# Patient Record
Sex: MALE | Race: OTHER | Employment: FULL TIME | URBAN - METROPOLITAN AREA
[De-identification: names, ages, dates, MRNs, and addresses within clinical notes are randomized per-mention and may not be internally consistent; named-entity substitution may affect disease eponyms.]

---

## 2021-09-20 ENCOUNTER — APPOINTMENT (OUTPATIENT)
Dept: GENERAL RADIOLOGY | Age: 41
DRG: 871 | End: 2021-09-20
Attending: EMERGENCY MEDICINE
Payer: COMMERCIAL

## 2021-09-20 ENCOUNTER — HOSPITAL ENCOUNTER (INPATIENT)
Age: 41
LOS: 14 days | Discharge: HOME HEALTH | DRG: 871 | End: 2021-10-04
Attending: EMERGENCY MEDICINE | Admitting: INTERNAL MEDICINE
Payer: COMMERCIAL

## 2021-09-20 DIAGNOSIS — R06.02 SHORTNESS OF BREATH: ICD-10-CM

## 2021-09-20 DIAGNOSIS — D72.819 LEUKOPENIA, UNSPECIFIED TYPE: ICD-10-CM

## 2021-09-20 DIAGNOSIS — J96.01 ACUTE HYPOXEMIC RESPIRATORY FAILURE DUE TO COVID-19 (HCC): ICD-10-CM

## 2021-09-20 DIAGNOSIS — U07.1 ACUTE HYPOXEMIC RESPIRATORY FAILURE DUE TO COVID-19 (HCC): ICD-10-CM

## 2021-09-20 DIAGNOSIS — J12.82 PNEUMONIA DUE TO COVID-19 VIRUS: Primary | ICD-10-CM

## 2021-09-20 DIAGNOSIS — J96.01 ACUTE RESPIRATORY FAILURE WITH HYPOXIA (HCC): ICD-10-CM

## 2021-09-20 DIAGNOSIS — U07.1 COVID-19: ICD-10-CM

## 2021-09-20 DIAGNOSIS — U07.1 PNEUMONIA DUE TO COVID-19 VIRUS: Primary | ICD-10-CM

## 2021-09-20 LAB
ALBUMIN SERPL-MCNC: 2.5 G/DL (ref 3.5–5)
ALBUMIN/GLOB SERPL: 0.5 {RATIO} (ref 1.2–3.5)
ALP SERPL-CCNC: 95 U/L (ref 50–136)
ALT SERPL-CCNC: 124 U/L (ref 12–65)
ANION GAP SERPL CALC-SCNC: 11 MMOL/L (ref 7–16)
ARTERIAL PATENCY WRIST A: POSITIVE
AST SERPL-CCNC: 46 U/L (ref 15–37)
ATRIAL RATE: 288 BPM
BASE EXCESS BLD CALC-SCNC: 0.5 MMOL/L
BASOPHILS # BLD: 0 K/UL (ref 0–0.2)
BASOPHILS NFR BLD: 0 % (ref 0–2)
BDY SITE: ABNORMAL
BILIRUB SERPL-MCNC: 0.5 MG/DL (ref 0.2–1.1)
BUN SERPL-MCNC: 20 MG/DL (ref 6–23)
CALCIUM SERPL-MCNC: 9.1 MG/DL (ref 8.3–10.4)
CALCULATED R AXIS, ECG10: 39 DEGREES
CALCULATED T AXIS, ECG11: 31 DEGREES
CHLORIDE SERPL-SCNC: 108 MMOL/L (ref 98–107)
CO2 BLD-SCNC: 25 MMOL/L (ref 13–23)
CO2 SERPL-SCNC: 23 MMOL/L (ref 21–32)
CREAT SERPL-MCNC: 0.93 MG/DL (ref 0.8–1.5)
CRP SERPL-MCNC: 9.1 MG/DL (ref 0–0.9)
D DIMER PPP FEU-MCNC: 1.96 UG/ML(FEU)
DIAGNOSIS, 93000: NORMAL
DIFFERENTIAL METHOD BLD: ABNORMAL
EOSINOPHIL # BLD: 0 K/UL (ref 0–0.8)
EOSINOPHIL NFR BLD: 0 % (ref 0.5–7.8)
ERYTHROCYTE [DISTWIDTH] IN BLOOD BY AUTOMATED COUNT: 13.2 % (ref 11.9–14.6)
GAS FLOW.O2 O2 DELIVERY SYS: ABNORMAL L/MIN
GLOBULIN SER CALC-MCNC: 5 G/DL (ref 2.3–3.5)
GLUCOSE BLD STRIP.AUTO-MCNC: 156 MG/DL (ref 65–100)
GLUCOSE SERPL-MCNC: 140 MG/DL (ref 65–100)
HCO3 BLD-SCNC: 24.6 MMOL/L (ref 22–26)
HCT VFR BLD AUTO: 41.7 % (ref 41.1–50.3)
HGB BLD-MCNC: 13.5 G/DL (ref 13.6–17.2)
IMM GRANULOCYTES # BLD AUTO: 0.1 K/UL (ref 0–0.5)
IMM GRANULOCYTES NFR BLD AUTO: 4 % (ref 0–5)
INR PPP: 1
LACTATE SERPL-SCNC: 3 MMOL/L (ref 0.4–2)
LYMPHOCYTES # BLD: 0.4 K/UL (ref 0.5–4.6)
LYMPHOCYTES NFR BLD: 18 % (ref 13–44)
MAGNESIUM SERPL-MCNC: 2.3 MG/DL (ref 1.8–2.4)
MCH RBC QN AUTO: 30.2 PG (ref 26.1–32.9)
MCHC RBC AUTO-ENTMCNC: 32.4 G/DL (ref 31.4–35)
MCV RBC AUTO: 93.3 FL (ref 79.6–97.8)
MONOCYTES # BLD: 0.3 K/UL (ref 0.1–1.3)
MONOCYTES NFR BLD: 12 % (ref 4–12)
NEUTS SEG # BLD: 1.5 K/UL (ref 1.7–8.2)
NEUTS SEG NFR BLD: 67 % (ref 43–78)
NRBC # BLD: 0 K/UL (ref 0–0.2)
O2/TOTAL GAS SETTING VFR VENT: 100 %
PCO2 BLD: 37.1 MMHG (ref 35–45)
PEEP RESPIRATORY: 12 CMH2O
PH BLD: 7.43 [PH] (ref 7.35–7.45)
PLATELET # BLD AUTO: 336 K/UL (ref 150–450)
PMV BLD AUTO: 9.5 FL (ref 9.4–12.3)
PO2 BLD: 87 MMHG (ref 75–100)
POTASSIUM SERPL-SCNC: 3.4 MMOL/L (ref 3.5–5.1)
PRESSURE SUPPORT SETTING VENT: 4 CMH2O
PROCALCITONIN SERPL-MCNC: 0.23 NG/ML
PROT SERPL-MCNC: 7.5 G/DL (ref 6.3–8.2)
PROTHROMBIN TIME: 13.3 SEC (ref 12.6–14.5)
Q-T INTERVAL, ECG07: 302 MS
QRS DURATION, ECG06: 92 MS
QTC CALCULATION (BEZET), ECG08: 408 MS
RBC # BLD AUTO: 4.47 M/UL (ref 4.23–5.6)
SAO2 % BLD: 96.9 % (ref 95–98)
SERVICE CMNT-IMP: ABNORMAL
SERVICE CMNT-IMP: ABNORMAL
SODIUM SERPL-SCNC: 142 MMOL/L (ref 136–145)
SPECIMEN TYPE: ABNORMAL
T4 FREE SERPL-MCNC: 1.7 NG/DL (ref 0.78–1.46)
TSH SERPL DL<=0.005 MIU/L-ACNC: 0.11 UIU/ML (ref 0.36–3.74)
VENTRICULAR RATE, ECG03: 110 BPM
WBC # BLD AUTO: 2.3 K/UL (ref 4.3–11.1)

## 2021-09-20 PROCEDURE — 84439 ASSAY OF FREE THYROXINE: CPT

## 2021-09-20 PROCEDURE — XW033H5 INTRODUCTION OF TOCILIZUMAB INTO PERIPHERAL VEIN, PERCUTANEOUS APPROACH, NEW TECHNOLOGY GROUP 5: ICD-10-PCS | Performed by: INTERNAL MEDICINE

## 2021-09-20 PROCEDURE — 80053 COMPREHEN METABOLIC PANEL: CPT

## 2021-09-20 PROCEDURE — 36600 WITHDRAWAL OF ARTERIAL BLOOD: CPT

## 2021-09-20 PROCEDURE — 74011636637 HC RX REV CODE- 636/637: Performed by: INTERNAL MEDICINE

## 2021-09-20 PROCEDURE — 83735 ASSAY OF MAGNESIUM: CPT

## 2021-09-20 PROCEDURE — 74011250636 HC RX REV CODE- 250/636: Performed by: INTERNAL MEDICINE

## 2021-09-20 PROCEDURE — 5A09457 ASSISTANCE WITH RESPIRATORY VENTILATION, 24-96 CONSECUTIVE HOURS, CONTINUOUS POSITIVE AIRWAY PRESSURE: ICD-10-PCS | Performed by: INTERNAL MEDICINE

## 2021-09-20 PROCEDURE — 99223 1ST HOSP IP/OBS HIGH 75: CPT | Performed by: INTERNAL MEDICINE

## 2021-09-20 PROCEDURE — 85025 COMPLETE CBC W/AUTO DIFF WBC: CPT

## 2021-09-20 PROCEDURE — 86140 C-REACTIVE PROTEIN: CPT

## 2021-09-20 PROCEDURE — 93005 ELECTROCARDIOGRAM TRACING: CPT | Performed by: EMERGENCY MEDICINE

## 2021-09-20 PROCEDURE — 94660 CPAP INITIATION&MGMT: CPT

## 2021-09-20 PROCEDURE — 96375 TX/PRO/DX INJ NEW DRUG ADDON: CPT

## 2021-09-20 PROCEDURE — 71045 X-RAY EXAM CHEST 1 VIEW: CPT

## 2021-09-20 PROCEDURE — 74011000250 HC RX REV CODE- 250: Performed by: INTERNAL MEDICINE

## 2021-09-20 PROCEDURE — 82803 BLOOD GASES ANY COMBINATION: CPT

## 2021-09-20 PROCEDURE — 82962 GLUCOSE BLOOD TEST: CPT

## 2021-09-20 PROCEDURE — 99285 EMERGENCY DEPT VISIT HI MDM: CPT

## 2021-09-20 PROCEDURE — 85379 FIBRIN DEGRADATION QUANT: CPT

## 2021-09-20 PROCEDURE — 96374 THER/PROPH/DIAG INJ IV PUSH: CPT

## 2021-09-20 PROCEDURE — 84443 ASSAY THYROID STIM HORMONE: CPT

## 2021-09-20 PROCEDURE — 2709999900 HC NON-CHARGEABLE SUPPLY

## 2021-09-20 PROCEDURE — 65620000000 HC RM CCU GENERAL

## 2021-09-20 PROCEDURE — 84145 PROCALCITONIN (PCT): CPT

## 2021-09-20 PROCEDURE — 74011000258 HC RX REV CODE- 258: Performed by: INTERNAL MEDICINE

## 2021-09-20 PROCEDURE — 74011250636 HC RX REV CODE- 250/636: Performed by: EMERGENCY MEDICINE

## 2021-09-20 PROCEDURE — 85610 PROTHROMBIN TIME: CPT

## 2021-09-20 PROCEDURE — 94761 N-INVAS EAR/PLS OXIMETRY MLT: CPT

## 2021-09-20 PROCEDURE — 83605 ASSAY OF LACTIC ACID: CPT

## 2021-09-20 RX ORDER — SODIUM CHLORIDE 0.9 % (FLUSH) 0.9 %
5-40 SYRINGE (ML) INJECTION EVERY 8 HOURS
Status: DISCONTINUED | OUTPATIENT
Start: 2021-09-20 | End: 2021-10-04 | Stop reason: HOSPADM

## 2021-09-20 RX ORDER — OXYCODONE HYDROCHLORIDE 5 MG/1
5 TABLET ORAL
Status: DISCONTINUED | OUTPATIENT
Start: 2021-09-20 | End: 2021-09-20

## 2021-09-20 RX ORDER — ONDANSETRON 2 MG/ML
4 INJECTION INTRAMUSCULAR; INTRAVENOUS
Status: COMPLETED | OUTPATIENT
Start: 2021-09-20 | End: 2021-09-20

## 2021-09-20 RX ORDER — FACIAL-BODY WIPES
10 EACH TOPICAL DAILY PRN
Status: DISCONTINUED | OUTPATIENT
Start: 2021-09-20 | End: 2021-10-04 | Stop reason: HOSPADM

## 2021-09-20 RX ORDER — MORPHINE SULFATE 2 MG/ML
2 INJECTION, SOLUTION INTRAMUSCULAR; INTRAVENOUS
Status: DISCONTINUED | OUTPATIENT
Start: 2021-09-20 | End: 2021-10-04 | Stop reason: HOSPADM

## 2021-09-20 RX ORDER — SODIUM CHLORIDE 9 MG/ML
50 INJECTION, SOLUTION INTRAVENOUS CONTINUOUS
Status: DISPENSED | OUTPATIENT
Start: 2021-09-20 | End: 2021-09-24

## 2021-09-20 RX ORDER — DEXAMETHASONE SODIUM PHOSPHATE 100 MG/10ML
20 INJECTION INTRAMUSCULAR; INTRAVENOUS
Status: COMPLETED | OUTPATIENT
Start: 2021-09-20 | End: 2021-09-20

## 2021-09-20 RX ORDER — ENOXAPARIN SODIUM 100 MG/ML
40 INJECTION SUBCUTANEOUS EVERY 12 HOURS
Status: DISCONTINUED | OUTPATIENT
Start: 2021-09-20 | End: 2021-10-04 | Stop reason: HOSPADM

## 2021-09-20 RX ORDER — ACETAMINOPHEN 325 MG/1
650 TABLET ORAL
Status: DISCONTINUED | OUTPATIENT
Start: 2021-09-20 | End: 2021-10-04 | Stop reason: HOSPADM

## 2021-09-20 RX ORDER — SODIUM CHLORIDE 0.9 % (FLUSH) 0.9 %
5-40 SYRINGE (ML) INJECTION AS NEEDED
Status: DISCONTINUED | OUTPATIENT
Start: 2021-09-20 | End: 2021-10-04 | Stop reason: HOSPADM

## 2021-09-20 RX ORDER — SODIUM CHLORIDE 0.9 % (FLUSH) 0.9 %
5-40 SYRINGE (ML) INJECTION AS NEEDED
Status: DISCONTINUED | OUTPATIENT
Start: 2021-09-20 | End: 2021-09-20

## 2021-09-20 RX ADMIN — INSULIN HUMAN 3 UNITS: 100 INJECTION, SOLUTION PARENTERAL at 21:00

## 2021-09-20 RX ADMIN — Medication 10 ML: at 14:00

## 2021-09-20 RX ADMIN — Medication 10 ML: at 21:00

## 2021-09-20 RX ADMIN — DEXAMETHASONE SODIUM PHOSPHATE 20 MG: 10 INJECTION INTRAMUSCULAR; INTRAVENOUS at 14:55

## 2021-09-20 RX ADMIN — Medication 10 ML: at 17:44

## 2021-09-20 RX ADMIN — FAMOTIDINE 20 MG: 10 INJECTION INTRAVENOUS at 20:24

## 2021-09-20 RX ADMIN — MORPHINE SULFATE 2 MG: 2 INJECTION, SOLUTION INTRAMUSCULAR; INTRAVENOUS at 20:24

## 2021-09-20 RX ADMIN — SODIUM CHLORIDE 1000 ML: 900 INJECTION, SOLUTION INTRAVENOUS at 13:30

## 2021-09-20 RX ADMIN — ENOXAPARIN SODIUM 40 MG: 40 INJECTION SUBCUTANEOUS at 17:43

## 2021-09-20 RX ADMIN — TOCILIZUMAB 648 MG: 180 INJECTION, SOLUTION SUBCUTANEOUS at 16:13

## 2021-09-20 RX ADMIN — SODIUM CHLORIDE 50 ML/HR: 900 INJECTION, SOLUTION INTRAVENOUS at 17:44

## 2021-09-20 RX ADMIN — ONDANSETRON 4 MG: 2 INJECTION INTRAMUSCULAR; INTRAVENOUS at 13:30

## 2021-09-20 NOTE — ED TRIAGE NOTES
Patient presents via [facility] with complains of respiratory distress. Positive covid since Monday. Patient was at home unable to obtain room air o2 sat. 15L improved to 80-80s. RR 40-60  . Afebrile 97.8 patient states that he has been sick since labor day. Patient denies covid vaccine.

## 2021-09-20 NOTE — PROGRESS NOTES
INITIAL SPIRITUAL ASSESSMENT     NOTED:      PATIENT IS BEING ADMITTED TO FLOOR FROM ER      [facility]    SPOUSE - [place]    LIVES LOCALLY          WILL ASSESS HOW WE CAN BEST SERVE THIS FAMILY

## 2021-09-20 NOTE — H&P
History and Physical Initial Visit NOTE           9/20/2021    Author Phan                        Date of Admission:  9/20/2021    The patient's chart is reviewed and the patient is discussed with the staff. Subjective:     Patient is a 42 y.o.  male seen and evaluated at the request of Dr. Aliya Scott for dyspnea and COVID 19. The patient presented to the ED with c/o worsening shortness of breath for almost two weeks. The patient tested positive for COVID at [facility] 9/10/2021. The patient c/o sinus congestion and cough. EMS was unable to  O2 sat at home and patient was placed on 15L O2 with improvement of O2 sats in the 80s, no fever noted on arrival to ED  CXR reveals bilateral atypical pneumonia, CRP 9.1, ABG reveals pH 7.43, CO2 37.1, O2 87, HCO3 24.6. The pt is unvaccinated. He has had fever and difficulty taking deep breaths. He reports some pain with breathing but no hemoptysis. He has not had any purulent sputum. Review of Systems  A comprehensive review of systems was negative except for that written in the HPI. None     History reviewed. No pertinent past medical history. History reviewed. No pertinent surgical history.   Social History     Socioeconomic History    Marital status:      Spouse name: Not on file    Number of children: Not on file    Years of education: Not on file    Highest education level: Not on file   Occupational History    Not on file   Tobacco Use    Smoking status: Not on file   Substance and Sexual Activity    Alcohol use: Not on file    Drug use: Not on file    Sexual activity: Not on file   Other Topics Concern    Not on file   Social History Narrative    Not on file     Social Determinants of Health     Financial Resource Strain:     Difficulty of Paying Living Expenses:    Food Insecurity:     Worried About Running Out of Food in the Last Year:     [address] in the Last Year: Transportation Needs:     Lack of Transportation (Medical):  Lack of Transportation (Non-Medical):    Physical Activity:     Days of Exercise per Week:     Minutes of Exercise per Session:    Stress:     Feeling of Stress :    Social Connections:     Frequency of Communication with Friends and Family:     Frequency of Social Gatherings with Friends and Family:     Attends Sabianist Services:     Active Member of Clubs or Organizations:     Attends Club or Organization Meetings:     Marital Status:    Intimate Partner Violence:     Fear of Current or Ex-Partner:     Emotionally Abused:     Physically Abused:     Sexually Abused:      History reviewed. No pertinent family history. No Known Allergies  Current Facility-Administered Medications   Medication Dose Route Frequency    sodium chloride (NS) flush 5-40 mL  5-40 mL IntraVENous Q8H    sodium chloride (NS) flush 5-40 mL  5-40 mL IntraVENous PRN     No current outpatient medications on file. Objective:   Blood pressure (!) 141/79, pulse 88, temperature 98.3 °F (36.8 °C), resp. rate (!) 32, height 6' (1.829 m), weight 190 lb (86.2 kg), SpO2 100 %. No intake or output data in the 24 hours ending 09/20/21 1503  PHYSICAL EXAM     Constitutional:  the patient is well developed and in no acute distress  EENMT:  Sclera clear, pupils equal, oral mucosa moist  Respiratory: On BIPAP 16/12 O2 sat 100%  Cardiovascular:  RRR without M,G,R  Gastrointestinal: soft and non-tender; with positive bowel sounds. Musculoskeletal: warm without cyanosis. There is no lower extremity edema.   Skin:  no jaundice or rashes, no wounds   Neurologic: no gross neuro deficits     Psychiatric:  alert and oriented x 3    CXR:  9/20/2021--Bilateral atypical pneumonia          Recent Labs     09/20/21  1330   WBC 2.3*   HGB 13.5*   HCT 41.7      PCT 0.23   LAC 3.0*      K 3.4*   *   CO2 23   *   BUN 20   CREA 0.93   MG 2.3   CA 9.1   ALB 2.5*   AST 46*   *   AP 95   CRP 9.1*     ECHO: No results found for this or any previous visit. Results     ** No results found for the last 336 hours. **        Inpat Anti-Infectives (From admission, onward)    None        Assessment and Plan:  (Medical Decision Making)   Active Problems:   Active Problems:    COVID-19 (9/20/2021)   --Tested positive for COVID on 9/10/21, CRP 9.1      Acute respiratory failure with hypoxia (HCC) (9/20/2021)  --On 100% BIPAP, will continue and wean at tolerated      Pneumonia due to COVID-19 virus (9/20/2021)  --Check sputum, blood cultures, start antibiotics. Given decadron 20 mg in ER and will continue 4 more days before dropping to 10 mg. Also will start Actemra. Shortness of breath (9/20/2021)  --Positive for COVID 10 days ago, will continue treatments as above        More than 50% of the time documented was spent in face-to-face contact with the patient and in the care of the patient on the floor/unit where the patient is located. Thank you very much for this referral.  We appreciate the opportunity to participate in this patient's care. Will follow along with above stated plan. Meghann Sanabria NP     I have spoken with and examined the patient. I agree with the above assessment and plan as documented. Gen: WD M in mild distress on BIPAP. Sat 100 on 100%. Can try to wean. Lungs:  Decreased BS at bases and a few scattered crackles  Heart:  RRR with no Murmur/Rubs/Gallops  Abd: soft   Ext: No edema    Previously healthy unvaccinated M with COVID symptoms for past 10-11 days now presenting with severe hypoxemic respiratory failure. Now on BIPAP with improved oxygenation. Getting high dose decadron and will start Actemra given elevated CRP and severe hypoxemia. Gives hx of some pleuritic CP so will check DD and consider full anticoagulation if high.        John Vanegas MD

## 2021-09-20 NOTE — PROGRESS NOTES
Pt arrived to room [id number] with RN and RT on 100% bipap. VSS. Pt alert and oriented. Pt bathed with CHG wipes. Linear bruises to chest, per patient from coining. No other skin issues.

## 2021-09-20 NOTE — ED NOTES
TRANSFER - OUT REPORT:    Verbal report given to Judy Hill RN (name) on Bell Miramontes  being transferred to 3 CCU(unit) for routine progression of care       Report consisted of patients Situation, Background, Assessment and   Recommendations(SBAR). Information from the following report(s) SBAR, Kardex, ED Summary, [facility] and Recent Results was reviewed with the receiving nurse. Lines:   Peripheral IV 09/20/21 Left Antecubital (Active)   Site Assessment Clean, dry, & intact 09/20/21 1325       Peripheral IV 09/20/21 Right Antecubital (Active)   Site Assessment Clean, dry, & intact 09/20/21 1326        Opportunity for questions and clarification was provided.       Patient transported with:   Monitor  O2 @ 15 liters  Registered Nurse

## 2021-09-20 NOTE — ED PROVIDER NOTES
40-year-old male with no significant past medical history presents to the emergency department complaining of approximately 12-day history of Covid-like symptoms, including cough, sinus congestion, headaches, fever and over the last several days progressive shortness of breath. Patient was tested 1 week ago and it was positive for Covid. At that time he was given an inhaler which is given him minimal relief. EMS was called today for progressive shortness of breath and found to be quite hypoxic and placed on nonrebreather. Patient denies any other significant medical history, lower extremity edema, PND orthopnea. He does describe some intermittent nausea but no vomiting. The history is provided by the patient. Respiratory Distress  This is a new problem. The average episode lasts 4 days. The problem occurs continuously. The current episode started more than 2 days ago. The problem has been rapidly worsening. Associated symptoms include a fever, headaches, cough and sputum production. Pertinent negatives include no coryza, no rhinorrhea, no sore throat, no swollen glands, no ear pain, no neck pain, no hemoptysis, no wheezing, no PND, no orthopnea, no chest pain, no syncope, no vomiting, no abdominal pain, no rash, no leg pain, no leg swelling and no claudication. Precipitated by: [id number]. He has tried beta-agonist inhalers for the symptoms. The treatment provided no relief. He has had no prior hospitalizations. He has had no prior ED visits. He has had no prior ICU admissions. Associated medical issues do not include asthma, COPD, pneumonia, chronic lung disease, PE, CAD, heart failure or recent surgery. No past medical history on file. No past surgical history on file. No family history on file.     Social History     Socioeconomic History    Marital status: Not on file     Spouse name: Not on file    Number of children: Not on file    Years of education: Not on file    Highest education level: Not on file   Occupational History    Not on file   Tobacco Use    Smoking status: Not on file   Substance and Sexual Activity    Alcohol use: Not on file    Drug use: Not on file    Sexual activity: Not on file   Other Topics Concern    Not on file   Social History Narrative    Not on file     Social Determinants of Health     Financial Resource Strain:     Difficulty of Paying Living Expenses:    Food Insecurity:     Worried About Running Out of Food in the Last Year:     [address] in the Last Year:    Transportation Needs:     Lack of Transportation (Medical):  Lack of Transportation (Non-Medical):    Physical Activity:     Days of Exercise per Week:     Minutes of Exercise per Session:    Stress:     Feeling of Stress :    Social Connections:     Frequency of Communication with Friends and Family:     Frequency of Social Gatherings with Friends and Family:     Attends Rastafari Services:     Active Member of Clubs or Organizations:     Attends Club or Organization Meetings:     Marital Status:    Intimate Partner Violence:     Fear of Current or Ex-Partner:     Emotionally Abused:     Physically Abused:     Sexually Abused: ALLERGIES: Patient has no known allergies. Review of Systems   Constitutional: Positive for activity change, appetite change, chills, fatigue and fever. HENT: Positive for congestion. Negative for ear pain, rhinorrhea and sore throat. Respiratory: Positive for cough, sputum production and shortness of breath. Negative for hemoptysis and wheezing. Cardiovascular: Negative for chest pain, orthopnea, claudication, leg swelling, syncope and PND. Gastrointestinal: Positive for diarrhea and nausea. Negative for abdominal pain, blood in stool, constipation and vomiting. Musculoskeletal: Negative for neck pain. Skin: Negative for rash. Neurological: Positive for headaches. All other systems reviewed and are negative.       Vitals: 09/20/21 1323 09/20/21 1326   BP: (!) 153/78    Pulse: (!) 108    Resp: 28    Temp: 98.3 °F (36.8 °C)    SpO2: 91% (!) 89%   Weight: 86.2 kg (190 lb)    Height: 6' (1.829 m)             Physical Exam  Vitals and nursing note reviewed. Constitutional:       General: He is not in acute distress. Appearance: He is well-developed. HENT:      Head: Normocephalic and atraumatic. Right Ear: External ear normal.      Left Ear: External ear normal.   Eyes:      Conjunctiva/sclera: Conjunctivae normal.      Pupils: Pupils are equal, round, and reactive to light. Cardiovascular:      Rate and Rhythm: Normal rate and regular rhythm. Heart sounds: Normal heart sounds. No murmur heard. Pulmonary:      Effort: Pulmonary effort is normal.      Breath sounds: Normal breath sounds. Abdominal:      General: Bowel sounds are normal.      Palpations: Abdomen is soft. Tenderness: There is no abdominal tenderness. There is no right CVA tenderness or left CVA tenderness. Musculoskeletal:         General: Normal range of motion. Cervical back: Normal range of motion and neck supple. Right lower leg: No edema. Left lower leg: No edema. Skin:     General: Skin is warm and dry. Capillary Refill: Capillary refill takes less than 2 seconds. Findings: Bruising (Linear pattern across the chest and bilateral back consistent with coining) present. Neurological:      Mental Status: He is alert and oriented to person, place, and time. Cranial Nerves: Cranial nerves are intact. Sensory: Sensation is intact. Motor: Motor function is intact. Psychiatric:         Mood and Affect: Mood is anxious.          Speech: Speech normal.         Behavior: Behavior normal.         Cognition and Memory: Cognition and memory normal.          MDM  Number of Diagnoses or Management Options  Acute respiratory failure with hypoxia ([address]): new and requires workup  Pneumonia due to COVID-19 virus: new and requires workup     Amount and/or Complexity of Data Reviewed  Clinical lab tests: reviewed and ordered  Tests in the radiology section of CPT®: reviewed and ordered  Tests in the medicine section of CPT®: ordered and reviewed  Obtain history from someone other than the patient: yes  Review and summarize past medical records: yes  Discuss the patient with other providers: yes  Independent visualization of images, tracings, or specimens: yes    Risk of Complications, Morbidity, and/or Mortality  Presenting problems: high  Diagnostic procedures: moderate  Management options: high    Patient Progress  Patient progress: (Patient remains critical, but improved from presentation. )    ED Course as of Sep 20 1435   Mon Sep 20, 2021   1353 Despite airvo at 60 L/min, patient cannot maintain sats above 90%. We will attempt BiPAP, and obtain ABG.    [BB]   1405 Patient currently 88% breathing 35-40 times per minute on BiPAP and 100% O2. Chest x-ray consistent with bilateral infiltrates consistent with atypical/Covid pneumonia. I explained the condition of the patient to the wife, and allowed her back in the room with the patient and she has been with him all week and we may need to intubate him soon at which time there will be no further communications. Patient does wish to be intubated if it will help his condition. [BB]      ED Course User Index  [BB] Camila Salgado MD       Procedures    The patient was observed in the ED. patient was given IV Decadron, after airvo failure, patient was placed on BiPAP and over proximately 20 minutes the patient oxygen saturation improved and his respiratory distress improved as well. At time of ABG, the patient was only breathing 28-30 times per minute.     Results Reviewed:      Recent Results (from the past 24 hour(s))   CBC WITH AUTOMATED DIFF    Collection Time: 09/20/21  1:30 PM   Result Value Ref Range    WBC 2.3 (L) 4.3 - 11.1 K/uL    RBC 4.47 4.23 - 5.6 M/uL HGB 13.5 (L) 13.6 - 17.2 g/dL    HCT 41.7 41.1 - 50.3 %    MCV 93.3 79.6 - 97.8 FL    MCH 30.2 26.1 - 32.9 PG    MCHC 32.4 31.4 - 35.0 g/dL    RDW 13.2 11.9 - 14.6 %    PLATELET 872 177 - 045 K/uL    MPV 9.5 9.4 - 12.3 FL    ABSOLUTE NRBC 0.00 0.0 - 0.2 K/uL    DF AUTOMATED      NEUTROPHILS 67 43 - 78 %    LYMPHOCYTES 18 13 - 44 %    MONOCYTES 12 4.0 - 12.0 %    EOSINOPHILS 0 (L) 0.5 - 7.8 %    BASOPHILS 0 0.0 - 2.0 %    IMMATURE GRANULOCYTES 4 0.0 - 5.0 %    ABS. NEUTROPHILS 1.5 (L) 1.7 - 8.2 K/UL    ABS. LYMPHOCYTES 0.4 (L) 0.5 - 4.6 K/UL    ABS. MONOCYTES 0.3 0.1 - 1.3 K/UL    ABS. EOSINOPHILS 0.0 0.0 - 0.8 K/UL    ABS. BASOPHILS 0.0 0.0 - 0.2 K/UL    ABS. IMM. GRANS. 0.1 0.0 - 0.5 K/UL   METABOLIC PANEL, COMPREHENSIVE    Collection Time: 09/20/21  1:30 PM   Result Value Ref Range    Sodium 142 136 - 145 mmol/L    Potassium 3.4 (L) 3.5 - 5.1 mmol/L    Chloride 108 (H) 98 - 107 mmol/L    CO2 23 21 - 32 mmol/L    Anion gap 11 7 - 16 mmol/L    Glucose 140 (H) 65 - 100 mg/dL    BUN 20 6 - 23 MG/DL    Creatinine 0.93 0.8 - 1.5 MG/DL    GFR est AA >60 >60 ml/min/1.73m2    GFR est non-AA >60 >60 ml/min/1.73m2    Calcium 9.1 8.3 - 10.4 MG/DL    Bilirubin, total 0.5 0.2 - 1.1 MG/DL    ALT (SGPT) 124 (H) 12 - 65 U/L    AST (SGOT) 46 (H) 15 - 37 U/L    Alk.  phosphatase 95 50 - 136 U/L    Protein, total 7.5 6.3 - 8.2 g/dL    Albumin 2.5 (L) 3.5 - 5.0 g/dL    Globulin 5.0 (H) 2.3 - 3.5 g/dL    A-G Ratio 0.5 (L) 1.2 - 3.5     C REACTIVE PROTEIN, QT    Collection Time: 09/20/21  1:30 PM   Result Value Ref Range    C-Reactive protein 9.1 (H) 0.0 - 0.9 mg/dL   MAGNESIUM    Collection Time: 09/20/21  1:30 PM   Result Value Ref Range    Magnesium 2.3 1.8 - 2.4 mg/dL   TSH 3RD GENERATION    Collection Time: 09/20/21  1:30 PM   Result Value Ref Range    TSH 0.114 (L) 0.358 - 3.740 uIU/mL   LACTIC ACID    Collection Time: 09/20/21  1:30 PM   Result Value Ref Range    Lactic acid 3.0 (H) 0.4 - 2.0 MMOL/L   BLOOD GAS, ARTERIAL POC Collection Time: 09/20/21  2:23 PM   Result Value Ref Range    Device: BIPAP MASK      FIO2 (POC) 100 %    pH (POC) 7.43 7.35 - 7.45      pCO2 (POC) 37.1 35 - 45 MMHG    pO2 (POC) 87 75 - 100 MMHG    HCO3 (POC) 24.6 22 - 26 MMOL/L    sO2 (POC) 96.9 95 - 98 %    Base excess (POC) 0.5 mmol/L    PEEP/CPAP (POC) 12 cmH2O    Pressure support 4 cmH2O    Allens test (POC) Positive      Site RIGHT RADIAL      Specimen type (POC) ARTERIAL      Performed by Beckie     CO2, POC 25 (H) 13 - 23 MMOL/L       XR CHEST SNGL V   Final Result   Bilateral atypical pneumonia. CRITICAL CARE Documentation: This patient is critically ill and there is a high probability of of imminent or life threatening deterioration in the patient's condition without immediate management. The nature of the patient's clinical problem is: Acute respiratory failure with hypoxia, COVID-19 pneumonia    I have spent 1 hour in direct patient care, documentation, review of labs/xrays/old records, discussion with Family, Staff, Colleague, Nursing . The time involved in the performance of separately reportable procedures was not counted toward critical care time.      Amadou Ahumada MD; 9/20/2021 @2:39 PM

## 2021-09-21 ENCOUNTER — APPOINTMENT (OUTPATIENT)
Dept: GENERAL RADIOLOGY | Age: 41
DRG: 871 | End: 2021-09-21
Attending: INTERNAL MEDICINE
Payer: COMMERCIAL

## 2021-09-21 PROBLEM — D72.819 LEUKOPENIA: Status: ACTIVE | Noted: 2021-09-21

## 2021-09-21 LAB
ANION GAP SERPL CALC-SCNC: 8 MMOL/L (ref 7–16)
BASOPHILS # BLD: 0 K/UL (ref 0–0.2)
BASOPHILS NFR BLD: 0 % (ref 0–2)
BUN SERPL-MCNC: 18 MG/DL (ref 6–23)
CALCIUM SERPL-MCNC: 8.8 MG/DL (ref 8.3–10.4)
CHLORIDE SERPL-SCNC: 110 MMOL/L (ref 98–107)
CO2 SERPL-SCNC: 26 MMOL/L (ref 21–32)
CREAT SERPL-MCNC: 0.66 MG/DL (ref 0.8–1.5)
DIFFERENTIAL METHOD BLD: ABNORMAL
EOSINOPHIL # BLD: 0 K/UL (ref 0–0.8)
EOSINOPHIL NFR BLD: 0 % (ref 0.5–7.8)
ERYTHROCYTE [DISTWIDTH] IN BLOOD BY AUTOMATED COUNT: 13.2 % (ref 11.9–14.6)
GLUCOSE BLD STRIP.AUTO-MCNC: 118 MG/DL (ref 65–100)
GLUCOSE BLD STRIP.AUTO-MCNC: 118 MG/DL (ref 65–100)
GLUCOSE BLD STRIP.AUTO-MCNC: 128 MG/DL (ref 65–100)
GLUCOSE BLD STRIP.AUTO-MCNC: 94 MG/DL (ref 65–100)
GLUCOSE SERPL-MCNC: 148 MG/DL (ref 65–100)
HCT VFR BLD AUTO: 37.9 % (ref 41.1–50.3)
HGB BLD-MCNC: 12.3 G/DL (ref 13.6–17.2)
IMM GRANULOCYTES # BLD AUTO: 0.1 K/UL (ref 0–0.5)
IMM GRANULOCYTES NFR BLD AUTO: 5 % (ref 0–5)
LYMPHOCYTES # BLD: 0.4 K/UL (ref 0.5–4.6)
LYMPHOCYTES NFR BLD: 38 % (ref 13–44)
MCH RBC QN AUTO: 30.3 PG (ref 26.1–32.9)
MCHC RBC AUTO-ENTMCNC: 32.5 G/DL (ref 31.4–35)
MCV RBC AUTO: 93.3 FL (ref 79.6–97.8)
MONOCYTES # BLD: 0.2 K/UL (ref 0.1–1.3)
MONOCYTES NFR BLD: 21 % (ref 4–12)
NEUTS SEG # BLD: 0.4 K/UL (ref 1.7–8.2)
NEUTS SEG NFR BLD: 37 % (ref 43–78)
NRBC # BLD: 0 K/UL (ref 0–0.2)
PLATELET # BLD AUTO: 296 K/UL (ref 150–450)
PMV BLD AUTO: 9.1 FL (ref 9.4–12.3)
POTASSIUM SERPL-SCNC: 4.2 MMOL/L (ref 3.5–5.1)
RBC # BLD AUTO: 4.06 M/UL (ref 4.23–5.6)
SERVICE CMNT-IMP: ABNORMAL
SERVICE CMNT-IMP: NORMAL
SODIUM SERPL-SCNC: 144 MMOL/L (ref 136–145)
WBC # BLD AUTO: 1.1 K/UL (ref 4.3–11.1)

## 2021-09-21 PROCEDURE — 74011250636 HC RX REV CODE- 250/636: Performed by: INTERNAL MEDICINE

## 2021-09-21 PROCEDURE — 99291 CRITICAL CARE FIRST HOUR: CPT | Performed by: INTERNAL MEDICINE

## 2021-09-21 PROCEDURE — 94660 CPAP INITIATION&MGMT: CPT

## 2021-09-21 PROCEDURE — 65620000000 HC RM CCU GENERAL

## 2021-09-21 PROCEDURE — 71045 X-RAY EXAM CHEST 1 VIEW: CPT

## 2021-09-21 PROCEDURE — 82962 GLUCOSE BLOOD TEST: CPT

## 2021-09-21 PROCEDURE — 74011000250 HC RX REV CODE- 250: Performed by: INTERNAL MEDICINE

## 2021-09-21 PROCEDURE — 74011000258 HC RX REV CODE- 258: Performed by: INTERNAL MEDICINE

## 2021-09-21 PROCEDURE — 2709999900 HC NON-CHARGEABLE SUPPLY

## 2021-09-21 PROCEDURE — 85025 COMPLETE CBC W/AUTO DIFF WBC: CPT

## 2021-09-21 PROCEDURE — 80048 BASIC METABOLIC PNL TOTAL CA: CPT

## 2021-09-21 PROCEDURE — 36415 COLL VENOUS BLD VENIPUNCTURE: CPT

## 2021-09-21 RX ADMIN — Medication 10 ML: at 05:36

## 2021-09-21 RX ADMIN — FAMOTIDINE 20 MG: 10 INJECTION INTRAVENOUS at 21:16

## 2021-09-21 RX ADMIN — FAMOTIDINE 20 MG: 10 INJECTION INTRAVENOUS at 09:11

## 2021-09-21 RX ADMIN — Medication 10 ML: at 14:15

## 2021-09-21 RX ADMIN — MORPHINE SULFATE 2 MG: 2 INJECTION, SOLUTION INTRAMUSCULAR; INTRAVENOUS at 23:40

## 2021-09-21 RX ADMIN — Medication 10 ML: at 14:14

## 2021-09-21 RX ADMIN — Medication 10 ML: at 21:18

## 2021-09-21 RX ADMIN — ENOXAPARIN SODIUM 40 MG: 40 INJECTION SUBCUTANEOUS at 05:35

## 2021-09-21 RX ADMIN — DEXAMETHASONE SODIUM PHOSPHATE 20 MG: 10 INJECTION, SOLUTION INTRAMUSCULAR; INTRAVENOUS at 14:11

## 2021-09-21 RX ADMIN — SODIUM CHLORIDE 50 ML/HR: 900 INJECTION, SOLUTION INTRAVENOUS at 02:16

## 2021-09-21 RX ADMIN — ENOXAPARIN SODIUM 40 MG: 40 INJECTION SUBCUTANEOUS at 18:03

## 2021-09-21 RX ADMIN — MORPHINE SULFATE 2 MG: 2 INJECTION, SOLUTION INTRAMUSCULAR; INTRAVENOUS at 02:21

## 2021-09-21 RX ADMIN — MORPHINE SULFATE 2 MG: 2 INJECTION, SOLUTION INTRAMUSCULAR; INTRAVENOUS at 19:39

## 2021-09-21 NOTE — ACP (ADVANCE CARE PLANNING)
Advance Care Planning     General Advance Care Planning (ACP) Conversation      Date of Conversation: 9/20/2021      Healthcare Decision Maker:     Primary Decision Maker: Leona Byrd - Spouse - [phone]  Click here to complete [address] including selection of the Healthcare Decision Maker Relationship (ie \"Primary\")      Today we documented Decision Maker(s) consistent with Legal Next of Kin hierarchy. Content/Action Overview:   No LW/HCPOA on file currently. Spouse legal NOK, pt currently on BIPAP 50%. Full code per MD orders.      Length of Voluntary ACP Conversation in minutes:  <16 minutes (Non-Billable)    Lieutenant Juanita RN

## 2021-09-21 NOTE — PROGRESS NOTES
A follow up visit was made to the patient. Emotional support, spiritual presence and   prayer were provided for the patient. He was sitting in his recliner.       Macie Phillips, [address] [facility], [facility]

## 2021-09-21 NOTE — PROGRESS NOTES
Bedside, Verbal and Written shift change report given to Fer Wilder, RN and Maulik Rey, RN (oncoming nurse) by Ousmane Barrera RN (offgoing nurse). Report included the following information SBAR, Kardex, ED Summary, Intake/Output, MAR, Accordion, Recent Results and Cardiac Rhythm NSR. Patient alert and oriented x4. Bipap running at 605, )2 sats in the upper 90's, lung sounds clear and diminished. NSR on the monitor, patient resting quietly. Normal Saline running at 50. Dual skin assessment performed, redness noted to bridge of nose, skin barrier applied.

## 2021-09-21 NOTE — PROGRESS NOTES
Problem: Falls - Risk of  Goal: *Absence of Falls  Description: Document Sean Gasca Fall Risk and appropriate interventions in the flowsheet.   Outcome: Progressing Towards Goal  Note: Fall Risk Interventions:  Mobility Interventions: Bed/chair exit alarm, Communicate number of staff needed for ambulation/transfer, Patient to call before getting OOB         Medication Interventions: Bed/chair exit alarm, Evaluate medications/consider consulting pharmacy, Patient to call before getting OOB, Teach patient to arise slowly    Elimination Interventions: Bed/chair exit alarm, Call light in reach, Patient to call for help with toileting needs, Urinal in reach              Problem: Patient Education: Go to Patient Education Activity  Goal: Patient/Family Education  Outcome: Progressing Towards Goal

## 2021-09-21 NOTE — PROGRESS NOTES
[facility] Critical Care Note[de-identified] 9/21/2021  Mikey Hodge  Admission Date: 9/20/2021     Length of Stay: 1 days    Background: Patient is a 42 y.o.  male seen and evaluated at the request of Dr. Jessie Romero for dyspnea and COVID 19. The patient presented to the ED with c/o worsening shortness of breath for almost two weeks. The patient tested positive for COVID at [facility] 9/10/2021. The patient c/o sinus congestion and cough. EMS was unable to  O2 sat at home and patient was placed on 15L O2 with improvement of O2 sats in the 80s, no fever noted on arrival to ED  CXR reveals bilateral atypical pneumonia, CRP 9.1, ABG reveals pH 7.43, CO2 37.1, O2 87, HCO3 24.6.      The pt is unvaccinated. He has had fever and difficulty taking deep breaths. He reports some pain with breathing but no hemoptysis. He has not had any purulent sputum.        Notable PMH:  has no past medical history on file. 24 Hour events: Remains on BIPAP since admission. Able to wean O2 to 50% with sat currently  96. Got high dose decadron and Actemra yesterday. Leulopenia persists. Remains a bit lethargic. ROS: unable to obtain/negative except as listed elsewhere. Lines: (insertion date)   ETT: (N/A)  Leggett: (N/A)  OGT: (N/A)  Central line: (N/A)  Arterial Line (N/A)    Drips: current dose (range)    None        Pertinent Exam:         Blood pressure 130/84, pulse 65, temperature 97.5 °F (36.4 °C), resp. rate 18, height 6' (1.829 m), weight 190 lb (86.2 kg), SpO2 95 %.      Intake/Output Summary (Last 24 hours) at 9/21/2021 0723  Last data filed at 9/21/2021 0554  Gross per 24 hour   Intake 608.33 ml   Output 850 ml   Net -241.67 ml     Constitutional:  arouses on BIPAP  EENMT:  Sclera clear, pupils equal, oral mucosa moist  Respiratory: Decreased BS with a few trace rhonchi  Cardiovascular:  RRR  Gastrointestinal:  soft with no tenderness; positive bowel sounds present  Musculoskeletal:  warm with no cyanosis, no lower extremity edema  Skin:  no jaundice or ecchymosis  Neurologic: awakens, lethargic  Psychiatric: calm    CXR:     9/21, 20: Improved aeration bilaterally. Recent Labs     09/21/21  0326 09/20/21  1330 09/20/21  1329   WBC 1.1* 2.3*  --    HGB 12.3* 13.5*  --    HCT 37.9* 41.7  --     336  --    INR  --   --  1.0   PCT  --  0.23  --    LAC  --  3.0*  --      Recent Labs     09/21/21  0326 09/20/21  1330    142   K 4.2 3.4*   * 108*   CO2 26 23   * 140*   BUN 18 20   CREA 0.66* 0.93   MG  --  2.3   CA 8.8 9.1   ALB  --  2.5*   AST  --  46*   ALT  --  124*   AP  --  95     Recent Labs     09/20/21  1330   LAC 3.0*   CRP 9.1*     Recent Labs     09/20/21  2031   GLUCPOC 156*     ECHO: No results found for this or any previous visit. Results     ** No results found for the last 336 hours. **        Inpat Anti-Infectives (From admission, onward)    None        Ventilator Settings:  Ideal body weight: 77.6 kg (171 lb 1.2 oz)   Mode FIO2 Rate Tidal Volume Pressure PEEP      50 %               Peak airway pressure:         Minute ventilation: 15 l/min  ABG:  Recent Labs     09/20/21  1423   PHI 7.43   PCO2I 37.1   PO2I 87   HCO3I 24.6     Assessment and Plan:  (Medical Decision Making)   Impression: 42 y.o. male with acute hypoxemic RF due to COVID 19 pneumonia. NEURO:   Sedation: None  Analgesia: None  Paralytics: None  CV:   Volume Status: Appears euvolemic  PULM:   Acute hypoxemic/hypercapneic respiratory failure: Weaned to 50% on BIPAP. CXR with better aeration suggesting Atx was significant reason for severe hypoxemia. Try on CPAP and try to mobilize. Severe ARDS 2/2 COVID: On high dose decadron and got Actemra. Continue high dose decadron for 5 doses. RENAL:  ЕКАТЕРИНА: Cr down to 0.66  Lactic acidosis: AG 8. Electrolytes: BS a little high from steroids  GI:   Nutrition: NPO for now.  May be able to advance diet if tolerates CPAP >> Airvo later. HEME:   Leukopenia[de-identified] follow. May need hematology to see if persists  Anemia: N/A  Thrombocytopenia: N/A  Anticoagulation: lovenox 40 q12  ID:   COVID-19: On high dose decadron and got Actemra. ENDO:   DM: SSI  Skin: no decub, turns, preventive care  Prophy: H2B, lovenox    Wife Alice Sandoval called at [phone] but no answer.      Full Code    The patient is critically ill with respiratory failure, circulatory failure and requires high complexity decision making for assessment and support including frequent ventilator adjustment , frequent evaluation and titration of therapies , application of advanced monitoring technologies and extensive interpretation of multiple databases  Time devoted to patient care services described in this note- 15 min face to face/ 20 min total evaluation time    Cumulative time devoted to patient care services by me for day of service -31 min     Rossy Gallegos MD

## 2021-09-21 NOTE — PROGRESS NOTES
Physician Progress Note      PATIENT:               Danilo Andres  CSN #:                  [id number]  :                       1980  ADMIT DATE:       2021 1:19 PM  [address] [place] DATE:  RESPONDING  PROVIDER #:        Melinda Redding MD          QUERY TEXT:    Pt admitted with COVID-19 and noted to have a wbc of 2.3, 1.1, crp 9.1, la-3.0 resps 28, pulse 117 . If possible, please document in progress notes and discharge summary if you are evaluating and/or treating: The medical record reflects the following:  Risk Factors: COVID +, COVID PNA, Acute resp failure, romeo  Clinical Indicators: wbc 2.3, 1.1, crp--9.1, la-3.0, procalcitonin--.23, resps 28, pulse 108,117,  Treatment: ICU care, bipap, decadron, actemra, fluids, labs, cxray, possible hematology consult. Options provided:  -- Sepsis present on admission due to COVID-19 infection  -- Sepsis not present on admission due to COVID-19 infection  -- Sepsis present on admission due to COVID-19 pneumonia  -- Sepsis not present on admission due to COVID-19 pneumonia  -- Covid-19 infection without sepsis  -- Covid-19 pneumonia without sepsis  -- Other - I will add my own diagnosis  -- Disagree - Not applicable / Not valid  -- Disagree - Clinically unable to determine / Unknown  -- Refer to Clinical Documentation Reviewer    PROVIDER RESPONSE TEXT:    This patient has sepsis which was present on admission due to COVID-19 infection.     Query created by: Mitchell Armando on 2021 11:09 AM      Electronically signed by:  Melinda Redding MD 2021 12:32 PM

## 2021-09-22 ENCOUNTER — APPOINTMENT (OUTPATIENT)
Dept: GENERAL RADIOLOGY | Age: 41
DRG: 871 | End: 2021-09-22
Attending: INTERNAL MEDICINE
Payer: COMMERCIAL

## 2021-09-22 LAB
ANION GAP SERPL CALC-SCNC: 11 MMOL/L (ref 7–16)
BASOPHILS # BLD: 0 K/UL (ref 0–0.2)
BASOPHILS NFR BLD: 0 % (ref 0–2)
BUN SERPL-MCNC: 24 MG/DL (ref 6–23)
CALCIUM SERPL-MCNC: 9 MG/DL (ref 8.3–10.4)
CHLORIDE SERPL-SCNC: 107 MMOL/L (ref 98–107)
CO2 SERPL-SCNC: 27 MMOL/L (ref 21–32)
CREAT SERPL-MCNC: 0.86 MG/DL (ref 0.8–1.5)
DIFFERENTIAL METHOD BLD: ABNORMAL
EOSINOPHIL # BLD: 0 K/UL (ref 0–0.8)
EOSINOPHIL NFR BLD: 2 % (ref 0.5–7.8)
ERYTHROCYTE [DISTWIDTH] IN BLOOD BY AUTOMATED COUNT: 12.7 % (ref 11.9–14.6)
GLUCOSE BLD STRIP.AUTO-MCNC: 126 MG/DL (ref 65–100)
GLUCOSE BLD STRIP.AUTO-MCNC: 75 MG/DL (ref 65–100)
GLUCOSE BLD STRIP.AUTO-MCNC: 76 MG/DL (ref 65–100)
GLUCOSE BLD STRIP.AUTO-MCNC: 96 MG/DL (ref 65–100)
GLUCOSE SERPL-MCNC: 74 MG/DL (ref 65–100)
HCT VFR BLD AUTO: 42.9 % (ref 41.1–50.3)
HGB BLD-MCNC: 13.4 G/DL (ref 13.6–17.2)
IMM GRANULOCYTES # BLD AUTO: 0.1 K/UL (ref 0–0.5)
IMM GRANULOCYTES NFR BLD AUTO: 3 % (ref 0–5)
LYMPHOCYTES # BLD: 0.9 K/UL (ref 0.5–4.6)
LYMPHOCYTES NFR BLD: 45 % (ref 13–44)
MCH RBC QN AUTO: 29.2 PG (ref 26.1–32.9)
MCHC RBC AUTO-ENTMCNC: 31.2 G/DL (ref 31.4–35)
MCV RBC AUTO: 93.5 FL (ref 79.6–97.8)
MONOCYTES # BLD: 0.2 K/UL (ref 0.1–1.3)
MONOCYTES NFR BLD: 9 % (ref 4–12)
NEUTS SEG # BLD: 0.8 K/UL (ref 1.7–8.2)
NEUTS SEG NFR BLD: 41 % (ref 43–78)
NRBC # BLD: 0 K/UL (ref 0–0.2)
PLATELET # BLD AUTO: 361 K/UL (ref 150–450)
PMV BLD AUTO: 9.2 FL (ref 9.4–12.3)
POTASSIUM SERPL-SCNC: 3.8 MMOL/L (ref 3.5–5.1)
RBC # BLD AUTO: 4.59 M/UL (ref 4.23–5.6)
SERVICE CMNT-IMP: ABNORMAL
SERVICE CMNT-IMP: NORMAL
SODIUM SERPL-SCNC: 145 MMOL/L (ref 136–145)
WBC # BLD AUTO: 1.9 K/UL (ref 4.3–11.1)

## 2021-09-22 PROCEDURE — 94660 CPAP INITIATION&MGMT: CPT

## 2021-09-22 PROCEDURE — 36415 COLL VENOUS BLD VENIPUNCTURE: CPT

## 2021-09-22 PROCEDURE — 99233 SBSQ HOSP IP/OBS HIGH 50: CPT | Performed by: INTERNAL MEDICINE

## 2021-09-22 PROCEDURE — 77010033711 HC HIGH FLOW OXYGEN

## 2021-09-22 PROCEDURE — 82962 GLUCOSE BLOOD TEST: CPT

## 2021-09-22 PROCEDURE — 74011250636 HC RX REV CODE- 250/636: Performed by: INTERNAL MEDICINE

## 2021-09-22 PROCEDURE — 71045 X-RAY EXAM CHEST 1 VIEW: CPT

## 2021-09-22 PROCEDURE — 85025 COMPLETE CBC W/AUTO DIFF WBC: CPT

## 2021-09-22 PROCEDURE — 65620000000 HC RM CCU GENERAL

## 2021-09-22 PROCEDURE — 74011000250 HC RX REV CODE- 250: Performed by: INTERNAL MEDICINE

## 2021-09-22 PROCEDURE — 2709999900 HC NON-CHARGEABLE SUPPLY

## 2021-09-22 PROCEDURE — 74011000258 HC RX REV CODE- 258: Performed by: INTERNAL MEDICINE

## 2021-09-22 PROCEDURE — 80048 BASIC METABOLIC PNL TOTAL CA: CPT

## 2021-09-22 RX ORDER — LORAZEPAM 2 MG/ML
1 INJECTION INTRAMUSCULAR
Status: DISCONTINUED | OUTPATIENT
Start: 2021-09-22 | End: 2021-10-04 | Stop reason: HOSPADM

## 2021-09-22 RX ADMIN — SODIUM CHLORIDE 50 ML/HR: 900 INJECTION, SOLUTION INTRAVENOUS at 13:57

## 2021-09-22 RX ADMIN — MORPHINE SULFATE 2 MG: 2 INJECTION, SOLUTION INTRAMUSCULAR; INTRAVENOUS at 06:10

## 2021-09-22 RX ADMIN — Medication 10 ML: at 06:10

## 2021-09-22 RX ADMIN — FAMOTIDINE 20 MG: 10 INJECTION INTRAVENOUS at 08:24

## 2021-09-22 RX ADMIN — LORAZEPAM 1 MG: 2 INJECTION INTRAMUSCULAR; INTRAVENOUS at 19:47

## 2021-09-22 RX ADMIN — ENOXAPARIN SODIUM 40 MG: 40 INJECTION SUBCUTANEOUS at 17:00

## 2021-09-22 RX ADMIN — ENOXAPARIN SODIUM 40 MG: 40 INJECTION SUBCUTANEOUS at 06:10

## 2021-09-22 RX ADMIN — FAMOTIDINE 20 MG: 10 INJECTION INTRAVENOUS at 20:57

## 2021-09-22 RX ADMIN — Medication 10 ML: at 21:01

## 2021-09-22 RX ADMIN — MORPHINE SULFATE 2 MG: 2 INJECTION, SOLUTION INTRAMUSCULAR; INTRAVENOUS at 16:52

## 2021-09-22 RX ADMIN — Medication 10 ML: at 14:06

## 2021-09-22 RX ADMIN — DEXAMETHASONE SODIUM PHOSPHATE 20 MG: 10 INJECTION, SOLUTION INTRAMUSCULAR; INTRAVENOUS at 13:57

## 2021-09-22 RX ADMIN — LORAZEPAM 1 MG: 2 INJECTION INTRAMUSCULAR; INTRAVENOUS at 12:52

## 2021-09-22 RX ADMIN — MORPHINE SULFATE 2 MG: 2 INJECTION, SOLUTION INTRAMUSCULAR; INTRAVENOUS at 11:03

## 2021-09-22 RX ADMIN — Medication 10 ML: at 14:07

## 2021-09-22 NOTE — PROGRESS NOTES
Chart reviewed as pt remains CCU covid positive isolation. Remains on BIPAP 50%, but MD wanting to try CPAP and mobile pt. CM will continue to follow for any assist and d/c needs /POC when stable per MD.     Care Management Interventions  PCP Verified by CM:  Huy Hernandez)  Mode of Transport at Discharge:  Other (see comment)  Transition of Care Consult (CM Consult): Discharge Planning  Support Systems: Spouse/Significant Other  Confirm Follow Up Transport: Family  Freedom of Choice List was Provided with Basic Dialogue that Supports the Patient's Individualized Plan of Care/Goals, Treatment Preferences and Shares the Quality Data Associated with the Providers?: Yes   Resource Information Provided?:  [facility])  Discharge Location  Discharge Placement: Unable to determine at this time

## 2021-09-22 NOTE — PROGRESS NOTES
[facility] Critical Care Note[de-identified] 9/22/2021  [address], [address]  Admission Date: 9/20/2021     Length of Stay: 2 days    Background: Patient is a 42 y.o.  male seen and evaluated at the request of Dr. Desire Ramon for dyspnea and COVID 19. The patient presented to the ED with c/o worsening shortness of breath for almost two weeks. The patient tested positive for COVID at [facility] 9/10/2021. The patient c/o sinus congestion and cough. EMS was unable to  O2 sat at home and patient was placed on 15L O2 with improvement of O2 sats in the 80s, no fever noted on arrival to ED  CXR reveals bilateral atypical pneumonia, CRP 9.1, ABG reveals pH 7.43, CO2 37.1, O2 87, HCO3 24.6.      The pt is unvaccinated. He has had fever and difficulty taking deep breaths. He reports some pain with breathing but no hemoptysis. He has not had any purulent sputum.        Notable PMH:  has no past medical history on file. 24 Hour events: Remains on BIPAP since admission. Able to wean O2 to 50% with sat currently  96. Got high dose decadron and Actemra yesterday. Leulopenia persists. Remains a bit lethargic. ROS: unable to obtain/negative except as listed elsewhere. Lines: (insertion date)   ETT: (N/A)  Leggett: (N/A)  OGT: (N/A)  Central line: (N/A)  Arterial Line (N/A)    Drips: current dose (range)    None        Pertinent Exam:         Blood pressure 119/72, pulse (!) 55, temperature 97.4 °F (36.3 °C), resp. rate 25, height 6' (1.829 m), weight 190 lb (86.2 kg), SpO2 96 %.      Intake/Output Summary (Last 24 hours) at 9/22/2021 0851  Last data filed at 9/22/2021 0600  Gross per 24 hour   Intake 1255 ml   Output 1725 ml   Net -470 ml     Constitutional:  arouses on BIPAP  EENMT:  Sclera clear, pupils equal, oral mucosa moist  Respiratory: Decreased BS with a few trace rhonchi  Cardiovascular:  RRR  Gastrointestinal:  soft with no tenderness; positive bowel sounds present  Musculoskeletal:  warm with no cyanosis, no lower extremity edema  Skin:  no jaundice or ecchymosis  Neurologic: awakens, lethargic  Psychiatric: calm    CXR:     9/22:        9/21, 20: Improved aeration bilaterally. Recent Labs     09/22/21  0519 09/21/21  0326 09/20/21  1330 09/20/21  1329   WBC 1.9* 1.1* 2.3*  --    HGB 13.4* 12.3* 13.5*  --    HCT 42.9 37.9* 41.7  --     296 336  --    INR  --   --   --  1.0   PCT  --   --  0.23  --    LAC  --   --  3.0*  --      Recent Labs     09/22/21  0519 09/21/21  0326 09/20/21  1330    144 142   K 3.8 4.2 3.4*    110* 108*   CO2 27 26 23   GLU 74 148* 140*   BUN 24* 18 20   CREA 0.86 0.66* 0.93   MG  --   --  2.3   CA 9.0 8.8 9.1   ALB  --   --  2.5*   AST  --   --  46*   ALT  --   --  124*   AP  --   --  95     Recent Labs     09/20/21  1330   LAC 3.0*   CRP 9.1*     Recent Labs     09/22/21  0720 09/21/21  2110 09/21/21  1642   GLUCPOC 75 118* 128*     ECHO: No results found for this or any previous visit. Results     ** No results found for the last 336 hours. **        Inpat Anti-Infectives (From admission, onward)    None        Ventilator Settings:  Ideal body weight: 77.6 kg (171 lb 1.2 oz)   Mode FIO2 Rate Tidal Volume Pressure PEEP      50 %               Peak airway pressure:         Minute ventilation: 12.8 l/min  ABG:  Recent Labs     09/20/21  1423   PHI 7.43   PCO2I 37.1   PO2I 87   HCO3I 24.6     Assessment and Plan:  (Medical Decision Making)   Impression: 42 y.o. male with acute hypoxemic RF due to COVID 19 pneumonia. NEURO:   Sedation: None  Analgesia: None  Paralytics: None  CV:   Volume Status: Appears euvolemic  PULM:   Acute hypoxemic/hypercapneic respiratory failure: Weaned to 50% on BIPAP. CXR with better aeration suggesting Atx was significant reason for severe hypoxemia. Try on CPAP and try to mobilize again. Severe ARDS 2/2 COVID: On high dose decadron and got Actemra.  Continue high dose decadron for 5 doses. RENAL:  ЕКАТЕРИНА: Cr 0.86  Lactic acidosis: AG 8. Electrolytes: BS normal despite steroids  GI:   Nutrition: NPO for now. May be able to advance diet if tolerates CPAP >> Airvo later. HEME:   Leukopenia: WBC up to 1.9. May need hematology to see if persists  Anemia: N/A  Thrombocytopenia: N/A  Anticoagulation: lovenox 40 q12  ID:   COVID-19: On high dose decadron and got Actemra. ENDO:   DM: SSI  Skin: no decub, turns, preventive care  Prophy: H2B, lovenox    Wife Lisa Rondon called at [phone] but no answer.      Full Code    The patient is critically ill with respiratory failure, circulatory failure and requires high complexity decision making for assessment and support including frequent ventilator adjustment , frequent evaluation and titration of therapies , application of advanced monitoring technologies and extensive interpretation of multiple databases  Time devoted to patient care services described in this note- 15 min face to face/ 20 min total evaluation time        Roena Duverney, MD

## 2021-09-22 NOTE — PROGRESS NOTES
Bedside and verbal shift change report received from  Lupe Mejias and Sierra Edgar RN (offgoing nurse). Report included the following information SBAR, Kardex, Intake/Output, MAR, Recent Results, Med Rec Status, Cardiac Rhythm SR/SB, Alarm Parameters  and Dual Neuro Assessment.      Dual skin assessment completed at bedside: WDL (list pertinent skin assessment findings)    Dual verification of gtts completed (name of gtts verified): n/a

## 2021-09-22 NOTE — PROGRESS NOTES
Pt remains anxious after prn morphine recently given. Dr. Judith Roper notified and order received for Ativan 1 mg q6 hr prn.

## 2021-09-22 NOTE — PROGRESS NOTES
Problem: Falls - Risk of  Goal: *Absence of Falls  Description: Document Faustino Daniella Fall Risk and appropriate interventions in the flowsheet. Outcome: Progressing Towards Goal  Note: Fall Risk Interventions:  Mobility Interventions: Bed/chair exit alarm, Communicate number of staff needed for ambulation/transfer         Medication Interventions: Bed/chair exit alarm    Elimination Interventions: Bed/chair exit alarm              Problem: Patient Education: Go to Patient Education Activity  Goal: Patient/Family Education  Outcome: Progressing Towards Goal     Problem: Pressure Injury - Risk of  Goal: *Prevention of pressure injury  Description: Document Jai Scale and appropriate interventions in the flowsheet.   Outcome: Progressing Towards Goal  Note: Pressure Injury Interventions:  Sensory Interventions: Assess changes in LOC, Assess need for specialty bed         Activity Interventions: Assess need for specialty bed, Chair cushion    Mobility Interventions: Chair cushion, HOB 30 degrees or less    Nutrition Interventions: Document food/fluid/supplement intake, Discuss nutritional consult with provider    Friction and Shear Interventions: HOB 30 degrees or less, Lift team/patient mobility team                Problem: Patient Education: Go to Patient Education Activity  Goal: Patient/Family Education  Outcome: Progressing Towards Goal

## 2021-09-22 NOTE — PROGRESS NOTES
A follow up visit was made to the patient. Emotional support, spiritual presence and   prayer were provided for the patient. He was sitting in his recliner.       Cira Blue, [address] [facility], [facility]

## 2021-09-23 ENCOUNTER — APPOINTMENT (OUTPATIENT)
Dept: GENERAL RADIOLOGY | Age: 41
DRG: 871 | End: 2021-09-23
Attending: INTERNAL MEDICINE
Payer: COMMERCIAL

## 2021-09-23 LAB
ANION GAP SERPL CALC-SCNC: 10 MMOL/L (ref 7–16)
BASOPHILS # BLD: 0 K/UL (ref 0–0.2)
BASOPHILS NFR BLD: 0 % (ref 0–2)
BUN SERPL-MCNC: 22 MG/DL (ref 6–23)
CALCIUM SERPL-MCNC: 8.9 MG/DL (ref 8.3–10.4)
CHLORIDE SERPL-SCNC: 105 MMOL/L (ref 98–107)
CO2 SERPL-SCNC: 26 MMOL/L (ref 21–32)
CREAT SERPL-MCNC: 0.74 MG/DL (ref 0.8–1.5)
DIFFERENTIAL METHOD BLD: ABNORMAL
EOSINOPHIL # BLD: 0.2 K/UL (ref 0–0.8)
EOSINOPHIL NFR BLD: 6 % (ref 0.5–7.8)
ERYTHROCYTE [DISTWIDTH] IN BLOOD BY AUTOMATED COUNT: 12.2 % (ref 11.9–14.6)
GLUCOSE BLD STRIP.AUTO-MCNC: 161 MG/DL (ref 65–100)
GLUCOSE BLD STRIP.AUTO-MCNC: 63 MG/DL (ref 65–100)
GLUCOSE BLD STRIP.AUTO-MCNC: 71 MG/DL (ref 65–100)
GLUCOSE BLD STRIP.AUTO-MCNC: 99 MG/DL (ref 65–100)
GLUCOSE SERPL-MCNC: 66 MG/DL (ref 65–100)
HCT VFR BLD AUTO: 45 % (ref 41.1–50.3)
HGB BLD-MCNC: 14.6 G/DL (ref 13.6–17.2)
IMM GRANULOCYTES # BLD AUTO: 0.1 K/UL (ref 0–0.5)
IMM GRANULOCYTES NFR BLD AUTO: 4 % (ref 0–5)
LYMPHOCYTES # BLD: 1.2 K/UL (ref 0.5–4.6)
LYMPHOCYTES NFR BLD: 46 % (ref 13–44)
MCH RBC QN AUTO: 30.2 PG (ref 26.1–32.9)
MCHC RBC AUTO-ENTMCNC: 32.4 G/DL (ref 31.4–35)
MCV RBC AUTO: 93.2 FL (ref 79.6–97.8)
MONOCYTES # BLD: 0.2 K/UL (ref 0.1–1.3)
MONOCYTES NFR BLD: 7 % (ref 4–12)
NEUTS SEG # BLD: 1 K/UL (ref 1.7–8.2)
NEUTS SEG NFR BLD: 37 % (ref 43–78)
NRBC # BLD: 0 K/UL (ref 0–0.2)
PLATELET # BLD AUTO: 375 K/UL (ref 150–450)
PMV BLD AUTO: 9.4 FL (ref 9.4–12.3)
POTASSIUM SERPL-SCNC: 3.7 MMOL/L (ref 3.5–5.1)
RBC # BLD AUTO: 4.83 M/UL (ref 4.23–5.6)
SERVICE CMNT-IMP: ABNORMAL
SERVICE CMNT-IMP: ABNORMAL
SERVICE CMNT-IMP: NORMAL
SERVICE CMNT-IMP: NORMAL
SODIUM SERPL-SCNC: 141 MMOL/L (ref 138–145)
WBC # BLD AUTO: 2.6 K/UL (ref 4.3–11.1)

## 2021-09-23 PROCEDURE — 74011000250 HC RX REV CODE- 250: Performed by: INTERNAL MEDICINE

## 2021-09-23 PROCEDURE — 65620000000 HC RM CCU GENERAL

## 2021-09-23 PROCEDURE — 85025 COMPLETE CBC W/AUTO DIFF WBC: CPT

## 2021-09-23 PROCEDURE — 71045 X-RAY EXAM CHEST 1 VIEW: CPT

## 2021-09-23 PROCEDURE — 99233 SBSQ HOSP IP/OBS HIGH 50: CPT | Performed by: INTERNAL MEDICINE

## 2021-09-23 PROCEDURE — 74011250636 HC RX REV CODE- 250/636: Performed by: INTERNAL MEDICINE

## 2021-09-23 PROCEDURE — 80048 BASIC METABOLIC PNL TOTAL CA: CPT

## 2021-09-23 PROCEDURE — 74011636637 HC RX REV CODE- 636/637: Performed by: INTERNAL MEDICINE

## 2021-09-23 PROCEDURE — 36415 COLL VENOUS BLD VENIPUNCTURE: CPT

## 2021-09-23 PROCEDURE — 2709999900 HC NON-CHARGEABLE SUPPLY

## 2021-09-23 PROCEDURE — 82962 GLUCOSE BLOOD TEST: CPT

## 2021-09-23 PROCEDURE — 94660 CPAP INITIATION&MGMT: CPT

## 2021-09-23 PROCEDURE — 74011000258 HC RX REV CODE- 258: Performed by: INTERNAL MEDICINE

## 2021-09-23 RX ORDER — DEXMEDETOMIDINE HYDROCHLORIDE 4 UG/ML
.1-1.5 INJECTION, SOLUTION INTRAVENOUS
Status: DISCONTINUED | OUTPATIENT
Start: 2021-09-23 | End: 2021-09-28

## 2021-09-23 RX ORDER — FENTANYL CITRATE-0.9 % NACL/PF 25 MCG/ML
0-200 PLASTIC BAG, INJECTION (ML) INJECTION
Status: DISCONTINUED | OUTPATIENT
Start: 2021-09-23 | End: 2021-09-28

## 2021-09-23 RX ADMIN — SODIUM CHLORIDE 50 ML/HR: 900 INJECTION, SOLUTION INTRAVENOUS at 09:26

## 2021-09-23 RX ADMIN — LORAZEPAM 1 MG: 2 INJECTION INTRAMUSCULAR; INTRAVENOUS at 12:48

## 2021-09-23 RX ADMIN — Medication 10 ML: at 21:14

## 2021-09-23 RX ADMIN — MORPHINE SULFATE 2 MG: 2 INJECTION, SOLUTION INTRAMUSCULAR; INTRAVENOUS at 01:42

## 2021-09-23 RX ADMIN — DEXAMETHASONE SODIUM PHOSPHATE 20 MG: 10 INJECTION, SOLUTION INTRAMUSCULAR; INTRAVENOUS at 15:23

## 2021-09-23 RX ADMIN — INSULIN HUMAN 3 UNITS: 100 INJECTION, SOLUTION PARENTERAL at 21:17

## 2021-09-23 RX ADMIN — MORPHINE SULFATE 2 MG: 2 INJECTION, SOLUTION INTRAMUSCULAR; INTRAVENOUS at 09:09

## 2021-09-23 RX ADMIN — LORAZEPAM 1 MG: 2 INJECTION INTRAMUSCULAR; INTRAVENOUS at 01:56

## 2021-09-23 RX ADMIN — Medication 10 ML: at 15:22

## 2021-09-23 RX ADMIN — FAMOTIDINE 20 MG: 10 INJECTION INTRAVENOUS at 21:13

## 2021-09-23 RX ADMIN — Medication 10 ML: at 05:15

## 2021-09-23 RX ADMIN — MORPHINE SULFATE 2 MG: 2 INJECTION, SOLUTION INTRAMUSCULAR; INTRAVENOUS at 22:50

## 2021-09-23 RX ADMIN — FAMOTIDINE 20 MG: 10 INJECTION INTRAVENOUS at 09:10

## 2021-09-23 RX ADMIN — DEXMEDETOMIDINE HYDROCHLORIDE 0.7 MCG/KG/HR: 400 INJECTION INTRAVENOUS at 22:39

## 2021-09-23 RX ADMIN — ENOXAPARIN SODIUM 40 MG: 40 INJECTION SUBCUTANEOUS at 17:10

## 2021-09-23 RX ADMIN — LORAZEPAM 1 MG: 2 INJECTION INTRAMUSCULAR; INTRAVENOUS at 18:00

## 2021-09-23 RX ADMIN — ENOXAPARIN SODIUM 40 MG: 40 INJECTION SUBCUTANEOUS at 05:15

## 2021-09-23 RX ADMIN — MORPHINE SULFATE 2 MG: 2 INJECTION, SOLUTION INTRAMUSCULAR; INTRAVENOUS at 16:55

## 2021-09-23 RX ADMIN — DEXMEDETOMIDINE HYDROCHLORIDE 0.5 MCG/KG/HR: 400 INJECTION INTRAVENOUS at 17:59

## 2021-09-23 NOTE — PROGRESS NOTES
Bedside and verbal shift change report received from  [id number] [facility] (offgoing nurse). Report included the following information SBAR, Kardex, Intake/Output, MAR, Recent Results, Med Rec Status, Cardiac Rhythm SR/SB, Alarm Parameters  and Dual Neuro Assessment.      Dual skin assessment completed at bedside: WDL (list pertinent skin assessment findings)    Dual verification of gtts completed (name of gtts verified): n/a

## 2021-09-23 NOTE — PROGRESS NOTES
Bedside and Verbal shift change report given to Maximilian Staton RN (oncoming nurse) by Jasmyne Reyna RN (offgoing nurse). Report included the following information SBAR, Kardex, ED Summary, Intake/Output, MAR, Recent Results and Cardiac Rhythm NSR/SB. Pt resting on cpap 80%. Pt drowsy but follows commands. Lung sounds diminished. NSR/SB on monitor. Stomach soft upon palpation with hypoactive bowel sounds. Pt voids with urinal. Denies pain at this time with call light in reach.     NS @ 50 ml/hr

## 2021-09-23 NOTE — PROGRESS NOTES
[facility] Critical Care Note[de-identified] 9/23/2021  [address], [address]  Admission Date: 9/20/2021     Length of Stay: 3 days    Background: Patient is a 42 y.o.  male seen and evaluated at the request of Dr. Marielena Key for dyspnea and COVID 19. The patient presented to the ED with c/o worsening shortness of breath for almost two weeks. The patient tested positive for COVID at [facility] 9/10/2021. The patient c/o sinus congestion and cough. EMS was unable to  O2 sat at home and patient was placed on 15L O2 with improvement of O2 sats in the 80s, no fever noted on arrival to ED  CXR reveals bilateral atypical pneumonia, CRP 9.1, ABG reveals pH 7.43, CO2 37.1, O2 87, HCO3 24.6.      The pt is unvaccinated. He has had fever and difficulty taking deep breaths. He reports some pain with breathing but no hemoptysis. He has not had any purulent sputum.        Notable PMH:  has no past medical history on file. 24 Hour events: Weaned to [employer]. O2 requirement up and was on 80% CPAP overnight and now up in chair with sat of 90-96 on CPAP 10. More awake but didn't sleep well. Got confused last night but better today. ROS: unable to obtain/negative except as listed elsewhere. Lines: (insertion date)   ETT: (N/A)  Leggett: (N/A)  OGT: (N/A)  Central line: (N/A)  Arterial Line (N/A)    Drips: current dose (range)    None        Pertinent Exam:         Blood pressure (!) 147/90, pulse 69, temperature 99.2 °F (37.3 °C), resp. rate 23, height 6' (1.829 m), weight 190 lb (86.2 kg), SpO2 (!) 89 %.      Intake/Output Summary (Last 24 hours) at 9/23/2021 0903  Last data filed at 9/23/2021 9775  Gross per 24 hour   Intake 762.5 ml   Output 1850 ml   Net -1087.5 ml     Constitutional:  Comfortable on CPAP 10; up in chair  EENMT:  Sclera clear, pupils equal, oral mucosa moist  Respiratory: Decreased BS with a few trace rhonchi  Cardiovascular:  RRR  Gastrointestinal:  soft with no tenderness; positive bowel sounds present  Musculoskeletal:  warm with no cyanosis, no lower extremity edema  Skin:  no jaundice or ecchymosis  Neurologic: awakens and non focal  Psychiatric: calm    CXR:     9/23, 22:          9/21, 20: Improved aeration bilaterally. Recent Labs     09/22/21  0519 09/21/21  0326 09/20/21  1330 09/20/21  1329   WBC 1.9* 1.1* 2.3*  --    HGB 13.4* 12.3* 13.5*  --    HCT 42.9 37.9* 41.7  --     296 336  --    INR  --   --   --  1.0   PCT  --   --  0.23  --    LAC  --   --  3.0*  --      Recent Labs     09/22/21  0519 09/21/21  0326 09/20/21  1330    144 142   K 3.8 4.2 3.4*    110* 108*   CO2 27 26 23   GLU 74 148* 140*   BUN 24* 18 20   CREA 0.86 0.66* 0.93   MG  --   --  2.3   CA 9.0 8.8 9.1   ALB  --   --  2.5*   AST  --   --  46*   ALT  --   --  124*   AP  --   --  95     Recent Labs     09/20/21  1330   LAC 3.0*   CRP 9.1*     Recent Labs     09/23/21  0627 09/22/21 2056 09/22/21  1606   GLUCPOC 71 126* 96     ECHO: No results found for this or any previous visit. Results     ** No results found for the last 336 hours. **        Inpat Anti-Infectives (From admission, onward)    None        Ventilator Settings:  Ideal body weight: 77.6 kg (171 lb 1.2 oz)   Mode FIO2 Rate Tidal Volume Pressure PEEP      80 %               Peak airway pressure:         Minute ventilation: 12.3 l/min  ABG:  Recent Labs     09/20/21  1423   PHI 7.43   PCO2I 37.1   PO2I 87   HCO3I 24.6     Assessment and Plan:  (Medical Decision Making)   Impression: 42 y.o. male with acute hypoxemic RF due to COVID 19 pneumonia. NEURO:   Sedation: None  Analgesia: None  Paralytics: None  CV:   Volume Status: Appears euvolemic  PULM:   Acute hypoxemic/hypercapneic respiratory failure: Had weaned to 50% on BIPAP. CXR with better aeration suggesting Atx was significant reason for severe hypoxemia. Now requiring higher FIO2 on CPAP 10. Try a little higher CPAP.   Severe ARDS 2/2 COVID: On high dose decadron and got Actemra. Continue high dose decadron for 5 doses then 10 mg for 5 days. Recheck CRP in AM.  RENAL:  ЕКАТЕРИНА: Cr 0.86  Lactic acidosis: AG 8. Electrolytes: BS normal despite steroids  GI:   Nutrition: taking some po  HEME:   Leukopenia: WBC up to 1.9. May need hematology to see if persists  Anemia: N/A  Thrombocytopenia: N/A  Anticoagulation: lovenox 40 q12  ID:   COVID-19: On high dose decadron and got Actemra. ENDO:   DM: SSI  Skin: no decub, turns, preventive care  Prophy: H2B, lovenox    Wife Yahir Pang called at [phone] but no answer.      Full Code    The patient is critically ill with respiratory failure, circulatory failure and requires high complexity decision making for assessment and support including frequent ventilator adjustment , frequent evaluation and titration of therapies , application of advanced monitoring technologies and extensive interpretation of multiple databases        Bruna Franz MD

## 2021-09-23 NOTE — PROGRESS NOTES
A follow up visit was made to the patient. Emotional support, spiritual presence and   prayer were provided for the patient. He is on the BIPAP.       Alanna Marks, [address] [facility], [facility]

## 2021-09-24 LAB
ANION GAP SERPL CALC-SCNC: 9 MMOL/L (ref 7–16)
BASOPHILS # BLD: 0 K/UL (ref 0–0.2)
BASOPHILS NFR BLD: 1 % (ref 0–2)
BUN SERPL-MCNC: 21 MG/DL (ref 6–23)
CALCIUM SERPL-MCNC: 8.7 MG/DL (ref 8.3–10.4)
CHLORIDE SERPL-SCNC: 105 MMOL/L (ref 98–107)
CO2 SERPL-SCNC: 26 MMOL/L (ref 21–32)
CREAT SERPL-MCNC: 0.69 MG/DL (ref 0.8–1.5)
CRP SERPL-MCNC: 1.2 MG/DL (ref 0–0.9)
DIFFERENTIAL METHOD BLD: ABNORMAL
EOSINOPHIL # BLD: 0 K/UL (ref 0–0.8)
EOSINOPHIL NFR BLD: 2 % (ref 0.5–7.8)
ERYTHROCYTE [DISTWIDTH] IN BLOOD BY AUTOMATED COUNT: 11.9 % (ref 11.9–14.6)
GLUCOSE BLD STRIP.AUTO-MCNC: 109 MG/DL (ref 65–100)
GLUCOSE BLD STRIP.AUTO-MCNC: 114 MG/DL (ref 65–100)
GLUCOSE BLD STRIP.AUTO-MCNC: 94 MG/DL (ref 65–100)
GLUCOSE SERPL-MCNC: 132 MG/DL (ref 65–100)
HCT VFR BLD AUTO: 41.9 % (ref 41.1–50.3)
HGB BLD-MCNC: 13.9 G/DL (ref 13.6–17.2)
IMM GRANULOCYTES # BLD AUTO: 0.1 K/UL (ref 0–0.5)
IMM GRANULOCYTES NFR BLD AUTO: 4 % (ref 0–5)
LYMPHOCYTES # BLD: 0.3 K/UL (ref 0.5–4.6)
LYMPHOCYTES NFR BLD: 22 % (ref 13–44)
MAGNESIUM SERPL-MCNC: 2.4 MG/DL (ref 1.8–2.4)
MCH RBC QN AUTO: 29.3 PG (ref 26.1–32.9)
MCHC RBC AUTO-ENTMCNC: 33.2 G/DL (ref 31.4–35)
MCV RBC AUTO: 88.4 FL (ref 79.6–97.8)
MONOCYTES # BLD: 0.1 K/UL (ref 0.1–1.3)
MONOCYTES NFR BLD: 6 % (ref 4–12)
NEUTS SEG # BLD: 1 K/UL (ref 1.7–8.2)
NEUTS SEG NFR BLD: 66 % (ref 43–78)
NRBC # BLD: 0 K/UL (ref 0–0.2)
PHOSPHATE SERPL-MCNC: 4.3 MG/DL (ref 2.5–4.5)
PLATELET # BLD AUTO: 369 K/UL (ref 150–450)
PMV BLD AUTO: 9.3 FL (ref 9.4–12.3)
POTASSIUM SERPL-SCNC: 4.2 MMOL/L (ref 3.5–5.1)
RBC # BLD AUTO: 4.74 M/UL (ref 4.23–5.6)
SERVICE CMNT-IMP: ABNORMAL
SERVICE CMNT-IMP: ABNORMAL
SERVICE CMNT-IMP: NORMAL
SODIUM SERPL-SCNC: 140 MMOL/L (ref 138–145)
TRIGL SERPL-MCNC: 217 MG/DL (ref 35–150)
WBC # BLD AUTO: 1.6 K/UL (ref 4.3–11.1)

## 2021-09-24 PROCEDURE — 86140 C-REACTIVE PROTEIN: CPT

## 2021-09-24 PROCEDURE — 65620000000 HC RM CCU GENERAL

## 2021-09-24 PROCEDURE — 84478 ASSAY OF TRIGLYCERIDES: CPT

## 2021-09-24 PROCEDURE — 82962 GLUCOSE BLOOD TEST: CPT

## 2021-09-24 PROCEDURE — 2709999900 HC NON-CHARGEABLE SUPPLY

## 2021-09-24 PROCEDURE — 36415 COLL VENOUS BLD VENIPUNCTURE: CPT

## 2021-09-24 PROCEDURE — 80048 BASIC METABOLIC PNL TOTAL CA: CPT

## 2021-09-24 PROCEDURE — 74011250637 HC RX REV CODE- 250/637: Performed by: INTERNAL MEDICINE

## 2021-09-24 PROCEDURE — 74011000250 HC RX REV CODE- 250: Performed by: INTERNAL MEDICINE

## 2021-09-24 PROCEDURE — 84100 ASSAY OF PHOSPHORUS: CPT

## 2021-09-24 PROCEDURE — 83735 ASSAY OF MAGNESIUM: CPT

## 2021-09-24 PROCEDURE — 02HV33Z INSERTION OF INFUSION DEVICE INTO SUPERIOR VENA CAVA, PERCUTANEOUS APPROACH: ICD-10-PCS | Performed by: INTERNAL MEDICINE

## 2021-09-24 PROCEDURE — 99233 SBSQ HOSP IP/OBS HIGH 50: CPT | Performed by: INTERNAL MEDICINE

## 2021-09-24 PROCEDURE — 85025 COMPLETE CBC W/AUTO DIFF WBC: CPT

## 2021-09-24 PROCEDURE — 36573 INSJ PICC RS&I 5 YR+: CPT | Performed by: INTERNAL MEDICINE

## 2021-09-24 PROCEDURE — 74011250636 HC RX REV CODE- 250/636: Performed by: INTERNAL MEDICINE

## 2021-09-24 PROCEDURE — C1751 CATH, INF, PER/CENT/MIDLINE: HCPCS

## 2021-09-24 PROCEDURE — 74011000258 HC RX REV CODE- 258: Performed by: INTERNAL MEDICINE

## 2021-09-24 RX ORDER — SODIUM CHLORIDE 0.9 % (FLUSH) 0.9 %
10 SYRINGE (ML) INJECTION EVERY 8 HOURS
Status: DISCONTINUED | OUTPATIENT
Start: 2021-09-24 | End: 2021-10-04 | Stop reason: HOSPADM

## 2021-09-24 RX ORDER — DEXAMETHASONE SODIUM PHOSPHATE 100 MG/10ML
10 INJECTION INTRAMUSCULAR; INTRAVENOUS EVERY 24 HOURS
Status: DISCONTINUED | OUTPATIENT
Start: 2021-09-25 | End: 2021-09-28

## 2021-09-24 RX ADMIN — Medication 10 ML: at 18:19

## 2021-09-24 RX ADMIN — SODIUM CHLORIDE 50 ML/HR: 900 INJECTION, SOLUTION INTRAVENOUS at 02:10

## 2021-09-24 RX ADMIN — Medication 10 ML: at 05:20

## 2021-09-24 RX ADMIN — DEXMEDETOMIDINE HYDROCHLORIDE 0.3 MCG/KG/HR: 400 INJECTION INTRAVENOUS at 18:38

## 2021-09-24 RX ADMIN — BISACODYL 10 MG: 10 SUPPOSITORY RECTAL at 18:47

## 2021-09-24 RX ADMIN — ENOXAPARIN SODIUM 40 MG: 40 INJECTION SUBCUTANEOUS at 05:20

## 2021-09-24 RX ADMIN — DEXMEDETOMIDINE HYDROCHLORIDE 0.6 MCG/KG/HR: 400 INJECTION INTRAVENOUS at 05:20

## 2021-09-24 RX ADMIN — Medication 10 ML: at 21:47

## 2021-09-24 RX ADMIN — Medication 10 ML: at 05:21

## 2021-09-24 RX ADMIN — FAMOTIDINE 20 MG: 10 INJECTION INTRAVENOUS at 20:12

## 2021-09-24 RX ADMIN — Medication 10 ML: at 13:55

## 2021-09-24 RX ADMIN — ENOXAPARIN SODIUM 40 MG: 40 INJECTION SUBCUTANEOUS at 18:18

## 2021-09-24 RX ADMIN — DEXAMETHASONE SODIUM PHOSPHATE 20 MG: 10 INJECTION, SOLUTION INTRAMUSCULAR; INTRAVENOUS at 13:54

## 2021-09-24 RX ADMIN — MORPHINE SULFATE 2 MG: 2 INJECTION, SOLUTION INTRAMUSCULAR; INTRAVENOUS at 20:16

## 2021-09-24 RX ADMIN — SOYBEAN OIL 250 ML: 20 INJECTION, SOLUTION INTRAVENOUS at 18:25

## 2021-09-24 RX ADMIN — FAMOTIDINE 20 MG: 10 INJECTION INTRAVENOUS at 09:12

## 2021-09-24 RX ADMIN — Medication 10 ML: at 13:56

## 2021-09-24 RX ADMIN — POTASSIUM PHOSPHATE, MONOBASIC POTASSIUM PHOSPHATE, DIBASIC: 224; 236 INJECTION, SOLUTION, CONCENTRATE INTRAVENOUS at 18:29

## 2021-09-24 NOTE — PROGRESS NOTES
Chart reviewed as pt remains CCU covid positive isolation. BIPAP 70%. Precedex gtt. CM following. LOS 4 days.

## 2021-09-24 NOTE — PROGRESS NOTES
Bedside and Verbal shift change report given to Faviola Carrillo (oncoming nurse) by Linda Santana RN (offgoing nurse). Report included the following information SBAR, Kardex, Intake/Output, MAR, Recent Results, Med Rec Status and Cardiac Rhythm SR 62. Remains on BiPAP 85% SpO2 93%. Up in recliner.

## 2021-09-24 NOTE — PROGRESS NOTES
PICC Placement Note    PRE-PROCEDURE VERIFICATION  Correct Procedure: yes. Time out completed with assistant Roslyn Danielson RN, VAT, and all persons present in agreement with time out. Risks and benefits reviewed with patient and informed consent obtained prior to assessment and procedure. Correct Site:  yes  Temperature: Temp: 97.3 °F (36.3 °C), Temperature Source: Temp Source: Axillary  Recent Labs     09/24/21  0310   BUN 21   CREA 0.69*      WBC 1.6*     Allergies: Patient has no known allergies. Education materials for [facility] given to patient or family. PROCEDURE DETAIL  A double lumen PICC line was started for total parenteral nutrition and IV access, a triple lumen 5 fr was not placed due to vessel size. The following documentation is in addition to the PICC properties in the lines/airways flowsheet :  Lot #: [id number]  xylocaine used: yes  Mid-Arm Circumference: 30 (cm)  Internal Catheter Length: 39 (cm)  External Catheter Length: 0 (cm)  Total Catheter Length: 39 (cm)  Vein Selection for PICC: Right brachial  Central Line Bundle followed yes  Complication Related to Insertion: none  Both the insertion guidewire and ECG guidewire were removed intact all ports have positive blood return and were flush well with normal saline. The location of the tip of the PICC is verified using ECG technology. The tip is in the SVC per ECG reading. See image below. Line is okay to use: yes    Marisel Hodges

## 2021-09-24 NOTE — PROGRESS NOTES
This is a follow-up visit to the patient, providing a spiritual presence, emotional support and prayer. The patient appears to be resting.     Ruel Lazaro, [address] [facility], [facility]

## 2021-09-24 NOTE — PROGRESS NOTES
Bedside and verbal shift change report received from  [address] (offgoing nurse). Report included the following information SBAR, Kardex, Intake/Output, MAR, Recent Results, Med Rec Status, Cardiac Rhythm SR/SB, Alarm Parameters  and Dual Neuro Assessment. Dual skin assessment completed at bedside: WDL (list pertinent skin assessment findings)    Dual verification of gtts completed (name of gtts verified): n/a    Pt resting in recliner. Precedex at 0.3.

## 2021-09-24 NOTE — CONSULTS
Comprehensive Nutrition Assessment  This assessment was completed remotely     Type and Reason for Visit: Initial, Consult  TPN Management (Pulmonary): PPN    Nutrition Recommendations/Plan:   Parenteral Nutrition:  InitiateTPN   5%DEX/ 4.25%AA 1 L/d at 45 ml/hr with 250 ml 20% Lipids daily over 12 hrs. To provide: 840 kcal/d (49% of needs), 42.5 grams of protein/d (52% of needs), 50 grams of CHO/d and 1330 ml of total volume/d. Lytes/L:   Sodium 60 meq (60 meq NaCl), Potassium 20 meq (0 meq KCl, 20 meq KPO4), 5 meq Mg, 4.5 meq Calcium. Osmolality Is <900 and appropriate for peripheral administration/  Other additives: MTE, MVI, 100 mg thiamin (day 1/7)  IVF:  Reduce IVF to 0 ml/hr once TPN started  Nutritional Supplement Therapy:   Implement electrolyte replacement per nutrition support protocols  Replacement indicated:  None  Labs:   BMP daily  Mg daily x 2 days then MWF  Phos daily x 2 days and MWF    Triglyceride today and  tomorrow   Continue POC Glucoses/SSI every 6 hrs     Malnutrition Assessment:  Malnutrition Status: At risk for malnutrition (specify) (COVID symptoms x 2 weeks PTA, NPO since admission 9/20. Unable to verify weight loss but suspected)  Nutrition Assessment:   Nutrition History: 9/24: Unable to obtain from pt. Pt with COVID symptoms for ~ 2 weesk PTA and thus suspected decreased po intake. NPO since admission d/t respiratory status      Nutrition Background: No known PMH. Presented with 12d h/o Covid-like symptoms, including cough, sinus congestion, headaches, fever and over the last several days PTA, had progressive SOB prompting presentation. Tested COVID + 9/10. Admitted with acute respiratory failure with hypoxia, COVID-19 pneumonia  Daily Update:  Pt on BIPAP 70% and unable to tolerate po intake. Pt has 2 PIV line. Plan to start PPN today  Abdominal Status (last documented): Intact, Distended, Semi-soft, Tender abdomen with Active  bowel sounds. Last BM  .   Pertinent Medications: prn dulcolax suppository, decadron, precedex, pepcid, ssi (received 3 units yesterday and none thus far today)  IVF: NS at 50 ml/hr  Pertinent Labs:   Lab Results   Component Value Date/Time    Sodium 140 09/24/2021 03:10 AM    Potassium 4.2 09/24/2021 03:10 AM    Chloride 105 09/24/2021 03:10 AM    CO2 26 09/24/2021 03:10 AM    Anion gap 9 09/24/2021 03:10 AM    Glucose 132 (H) 09/24/2021 03:10 AM    BUN 21 09/24/2021 03:10 AM    Creatinine 0.69 (L) 09/24/2021 03:10 AM    Calcium 8.7 09/24/2021 03:10 AM    Albumin 2.5 (L) 09/20/2021 01:30 PM    Magnesium 2.3 09/20/2021 01:30 PM     Remarks:  Increased glucoses likely d/t steroids, no h/o DM  Will be at increased risk for refeeding d/t suspected inadequate intake for ~ 2.5 weeks but will need to stat with lower lytes given typical high K,Phs and Mg in COVID      Nutrition Related Findings:   NFPE deferred d/t remote assessment and isolation status      Current Nutrition Therapies:  DIET NPO    Current Intake:   Average Meal Intake: NPO        Anthropometric Measures:  Height: 6' (182.9 cm)  Current Body Wt: 86.2 kg (190 lb 0.6 oz) (9/20), Weight source: Stated  BMI: 25.8, Overweight (BMI 25.0-29. 9)  Admission Body Weight: 190 lb 0.6 oz (stated)  Ideal Body Weight (lbs) (Calculated): 178 lbs (81 kg),    Usual Body Wt: 93.9 kg (207 lb) (9/10/2021 as PCP office, 190# at PCP OV 2018), Percent weight change: -8.2          Edema: No data recorded   Estimated Daily Nutrient Needs:  Energy (kcal/day): 4816-3998 (Kcal/kg (20-25), Weight Used: Admission (stated 86.2 kg))  Protein (g/day): 103-172 (1.2-2 grams/kg) Weight Used: (Admission (stated 86.2 kg))  Fluid (ml/day):    ( (minimize or per MD))    Nutrition Diagnosis:   · Inadequate oral intake related to impaired respiratory function as evidenced by NPO or clear liquid status due to medical condition    Nutrition Interventions:   Food and/or Nutrient Delivery: Continue NPO     Coordination of Nutrition Care: Continue to monitor while inpatient      Goals: Active Goal: PN intake >50% of estimated needs within 3 days    Nutrition Monitoring and Evaluation:      Food/Nutrient Intake Outcomes: Parenteral nutrition intake/tolerance  Physical Signs/Symptoms Outcomes: Biochemical data, Weight (respiratory status)    Discharge Planning:     Too soon to determine    Ericka MARIELENA Sheldon, LD, [facility]    Disaster Mode Active

## 2021-09-25 ENCOUNTER — APPOINTMENT (OUTPATIENT)
Dept: GENERAL RADIOLOGY | Age: 41
DRG: 871 | End: 2021-09-25
Attending: INTERNAL MEDICINE
Payer: COMMERCIAL

## 2021-09-25 LAB
ANION GAP SERPL CALC-SCNC: 9 MMOL/L (ref 7–16)
BASOPHILS # BLD: 0 K/UL (ref 0–0.2)
BASOPHILS NFR BLD: 1 % (ref 0–2)
BUN SERPL-MCNC: 26 MG/DL (ref 6–23)
CALCIUM SERPL-MCNC: 9 MG/DL (ref 8.3–10.4)
CHLORIDE SERPL-SCNC: 105 MMOL/L (ref 98–107)
CO2 SERPL-SCNC: 27 MMOL/L (ref 21–32)
CREAT SERPL-MCNC: 0.9 MG/DL (ref 0.8–1.5)
DIFFERENTIAL METHOD BLD: ABNORMAL
EOSINOPHIL # BLD: 0.1 K/UL (ref 0–0.8)
EOSINOPHIL NFR BLD: 5 % (ref 0.5–7.8)
ERYTHROCYTE [DISTWIDTH] IN BLOOD BY AUTOMATED COUNT: 12.2 % (ref 11.9–14.6)
GLUCOSE BLD STRIP.AUTO-MCNC: 122 MG/DL (ref 65–100)
GLUCOSE BLD STRIP.AUTO-MCNC: 125 MG/DL (ref 65–100)
GLUCOSE BLD STRIP.AUTO-MCNC: 41 MG/DL (ref 65–100)
GLUCOSE BLD STRIP.AUTO-MCNC: 94 MG/DL (ref 65–100)
GLUCOSE SERPL-MCNC: 96 MG/DL (ref 65–100)
HCT VFR BLD AUTO: 39.4 % (ref 41.1–50.3)
HGB BLD-MCNC: 13.3 G/DL (ref 13.6–17.2)
IMM GRANULOCYTES # BLD AUTO: 0.1 K/UL (ref 0–0.5)
IMM GRANULOCYTES NFR BLD AUTO: 5 % (ref 0–5)
LYMPHOCYTES # BLD: 0.5 K/UL (ref 0.5–4.6)
LYMPHOCYTES NFR BLD: 29 % (ref 13–44)
MAGNESIUM SERPL-MCNC: 2.4 MG/DL (ref 1.8–2.4)
MCH RBC QN AUTO: 30.9 PG (ref 26.1–32.9)
MCHC RBC AUTO-ENTMCNC: 33.8 G/DL (ref 31.4–35)
MCV RBC AUTO: 91.6 FL (ref 79.6–97.8)
MONOCYTES # BLD: 0.2 K/UL (ref 0.1–1.3)
MONOCYTES NFR BLD: 11 % (ref 4–12)
NEUTS SEG # BLD: 0.9 K/UL (ref 1.7–8.2)
NEUTS SEG NFR BLD: 50 % (ref 43–78)
NRBC # BLD: 0 K/UL (ref 0–0.2)
PHOSPHATE SERPL-MCNC: 3.6 MG/DL (ref 2.5–4.5)
PLATELET # BLD AUTO: 342 K/UL (ref 150–450)
PMV BLD AUTO: 9.8 FL (ref 9.4–12.3)
POTASSIUM SERPL-SCNC: 4.3 MMOL/L (ref 3.5–5.1)
RBC # BLD AUTO: 4.3 M/UL (ref 4.23–5.6)
SERVICE CMNT-IMP: ABNORMAL
SERVICE CMNT-IMP: NORMAL
SODIUM SERPL-SCNC: 141 MMOL/L (ref 136–145)
TRIGL SERPL-MCNC: 257 MG/DL (ref 35–150)
WBC # BLD AUTO: 1.8 K/UL (ref 4.3–11.1)

## 2021-09-25 PROCEDURE — 74011000250 HC RX REV CODE- 250: Performed by: INTERNAL MEDICINE

## 2021-09-25 PROCEDURE — 77030013794 HC KT TRNSDUC BLD EDWD -B

## 2021-09-25 PROCEDURE — 99233 SBSQ HOSP IP/OBS HIGH 50: CPT | Performed by: INTERNAL MEDICINE

## 2021-09-25 PROCEDURE — 83735 ASSAY OF MAGNESIUM: CPT

## 2021-09-25 PROCEDURE — 71045 X-RAY EXAM CHEST 1 VIEW: CPT

## 2021-09-25 PROCEDURE — 74011250636 HC RX REV CODE- 250/636: Performed by: INTERNAL MEDICINE

## 2021-09-25 PROCEDURE — 74011000258 HC RX REV CODE- 258: Performed by: INTERNAL MEDICINE

## 2021-09-25 PROCEDURE — 85025 COMPLETE CBC W/AUTO DIFF WBC: CPT

## 2021-09-25 PROCEDURE — 84478 ASSAY OF TRIGLYCERIDES: CPT

## 2021-09-25 PROCEDURE — 84100 ASSAY OF PHOSPHORUS: CPT

## 2021-09-25 PROCEDURE — 82962 GLUCOSE BLOOD TEST: CPT

## 2021-09-25 PROCEDURE — 2709999900 HC NON-CHARGEABLE SUPPLY

## 2021-09-25 PROCEDURE — 65620000000 HC RM CCU GENERAL

## 2021-09-25 PROCEDURE — 77030005402 HC CATH RAD ART LN KT TELE -B

## 2021-09-25 PROCEDURE — 80048 BASIC METABOLIC PNL TOTAL CA: CPT

## 2021-09-25 RX ADMIN — FAMOTIDINE 20 MG: 10 INJECTION INTRAVENOUS at 09:25

## 2021-09-25 RX ADMIN — Medication 10 ML: at 21:14

## 2021-09-25 RX ADMIN — SOYBEAN OIL 250 ML: 20 INJECTION, SOLUTION INTRAVENOUS at 18:10

## 2021-09-25 RX ADMIN — LORAZEPAM 1 MG: 2 INJECTION INTRAMUSCULAR; INTRAVENOUS at 19:15

## 2021-09-25 RX ADMIN — Medication 10 ML: at 13:01

## 2021-09-25 RX ADMIN — Medication 10 ML: at 05:14

## 2021-09-25 RX ADMIN — ENOXAPARIN SODIUM 40 MG: 40 INJECTION SUBCUTANEOUS at 05:14

## 2021-09-25 RX ADMIN — ENOXAPARIN SODIUM 40 MG: 40 INJECTION SUBCUTANEOUS at 17:20

## 2021-09-25 RX ADMIN — FAMOTIDINE 20 MG: 10 INJECTION INTRAVENOUS at 21:13

## 2021-09-25 RX ADMIN — DEXAMETHASONE SODIUM PHOSPHATE 10 MG: 10 INJECTION, SOLUTION INTRAMUSCULAR; INTRAVENOUS at 13:02

## 2021-09-25 RX ADMIN — DEXMEDETOMIDINE HYDROCHLORIDE 0.3 MCG/KG/HR: 400 INJECTION INTRAVENOUS at 14:46

## 2021-09-25 RX ADMIN — POTASSIUM PHOSPHATE, MONOBASIC POTASSIUM PHOSPHATE, DIBASIC: 224; 236 INJECTION, SOLUTION, CONCENTRATE INTRAVENOUS at 18:09

## 2021-09-25 RX ADMIN — MORPHINE SULFATE 2 MG: 2 INJECTION, SOLUTION INTRAMUSCULAR; INTRAVENOUS at 14:47

## 2021-09-25 NOTE — PROGRESS NOTES
Patient anxious about coughing up secretions. States \"it wont stop coming\" noted minimal amounts thru suction tube. Sitting up in recliner. Does not want to go back to bed. States \"its worse when he lays down\" BiPAP remains on setting have been adjusted 16/14 FiO2 100%.

## 2021-09-25 NOTE — PROGRESS NOTES
Bedside and verbal shift change report received from  [address] (offgoing nurse). Report included the following information SBAR, Kardex, Intake/Output, MAR, Recent Results, Med Rec Status, Cardiac Rhythm SR/SB, Alarm Parameters  and Dual Neuro Assessment. Dual skin assessment completed at bedside: WDL (list pertinent skin assessment findings)    Dual verification of gtts completed (name of gtts verified): n/a    Pt on BiPAP 100% sating 88-92%. Pt on precedex at 0.4.

## 2021-09-25 NOTE — PROGRESS NOTES
Comprehensive Nutrition Assessment  This assessment was completed remotely   Type and Reason for Visit: Reassess  TPN Management (Pulmonary)    Nutrition Recommendations/Plan:   Parenteral Nutrition:  Change PPN to CPN   14%DEX/ 8%AA 1 L/d at 45 ml/hr with 250 ml 20% Lipids daily  To provide: 1296 kcal/d (75% of needs), 80 grams of protein/d (78% of needs), 140 grams of CHO/d and 1330 ml of total volume/d. Lytes/L:   Sodium 60 meq (60 meq NaCl), Potassium 20 meq (0 meq KCl, 20 meq KPO4), 5 meq Mg, 4.5 meq Calcium  Other additives: MTE, MVI, 100 mg thiamin (day 2/7)  Nutritional Supplement Therapy:   Active electrolyte replacement per nutrition support protocols  Replacement indicated:  None  Labs:   BMP daily  Mg tomorrow and MWF  Phos tomorrow and MWF     Continue POC Glucoses/SSI. Malnutrition Assessment:  Malnutrition Status: At risk for malnutrition (specify) (COVID symptoms x 2 weeks PTA, NPO since admission 9/20. Unable to verify weight loss but suspected)    Nutrition Assessment:   Nutrition History: 9/24: Unable to obtain from pt. Pt with COVID symptoms for ~ 2 weeks PTA and thus suspected decreased po intake. NPO since admission d/t respiratory status      Nutrition Background: No known PMH. Presentedwith 12d h/o Covid-like symptoms, including cough, sinus congestion, headaches, fever and over the last several days PTA had progressive SOB. Tested COVID + 9/10. Admitted with acute respiratory failure with hypoxia, COVID-19 pneumonia  Daily Update:  Pt weaned to 75% BIPAP, PPN started 9/24 d/t inability to tolerate po intake d/t respiratory status. PICC line placed at 1550 9/24 so PPN can be changed to CPN today  Abdominal Status (last documented): Intact, Soft abdomen with Hypoactive  bowel sounds. Last BM 09/25/21.   Pertinent Medications: decadron ( dose decreased from 20 mg every day to 10 mg every day on 9/25), prn dulcolax suppository ( dose given 9/24 PM), precedex, pepcid, ssi (none yesterday or thus far today),   Pertinent Labs:   Lab Results   Component Value Date/Time    Sodium 141 09/25/2021 05:14 AM    Potassium 4.3 09/25/2021 05:14 AM    Chloride 105 09/25/2021 05:14 AM    CO2 27 09/25/2021 05:14 AM    Anion gap 9 09/25/2021 05:14 AM    Glucose 96 09/25/2021 05:14 AM    BUN 26 (H) 09/25/2021 05:14 AM    Creatinine 0.90 09/25/2021 05:14 AM    Calcium 9.0 09/25/2021 05:14 AM    Albumin 2.5 (L) 09/20/2021 01:30 PM    Magnesium 2.4 09/25/2021 05:14 AM    Phosphorus 3.6 09/25/2021 05:14 AM     Lab Results   Component Value Date/Time    Glucose 96 09/25/2021 05:14 AM    Glucose (POC) 94 09/25/2021 05:25 AM    Glucose (POC) 125 (H) 09/24/2021 11:53 PM    Glucose (POC) 114 (H) 09/24/2021 06:18 PM    Glucose (POC) 94 09/24/2021 11:17 AM    Glucose (POC) 109 (H) 09/24/2021 06:33 AM    Glucose (POC) 161 (H) 09/23/2021 09:12 PM     Lab Results   Component Value Date/Time    Triglyceride 257 (H) 09/25/2021 05:14 AM     Remarks:  Can continue daily IVFE  Increased glucoses likely d/t steroids, no h/o DM. Still at increased risk for refeeding with change to higher CHO TPN    Nutrition Related Findings:   9/24: PPN started, PICC lined placed after 1330. 9/25: PPN changed to CPN      Current Nutrition Therapies:  DIET NPO  Current Parenteral Nutrition Orders:  · Type and Formula: 5%DEX/5.25%AA   · Lipids: 250ml, Daily  · Duration: Continuous  · Rate/Volume: 45 ml/hr or 1L/d  · Current PN Order Provides: 840 kcal/d (49% of needs), 42.5 grams of protein/d (52% of needs), 50 grams of CHO/d and 1330 ml of total volume/d. · Goal PN Orders Provides:        Current Intake:   Average Meal Intake: NPO        Anthropometric Measures:  Height: 6' (182.9 cm)  Current Body Wt: 86.7 kg (191 lb 2.2 oz) (9/24), Weight source: Bed scale  BMI: 25.9, Overweight (BMI 25.0-29. 9)  Admission Body Weight: 190 lb 0.6 oz (stated)  Ideal Body Weight (lbs) (Calculated): 178 lbs (81 kg),    Usual Body Wt: 93.9 kg (207 lb) (9/10/2021 as PCP office, 190# at PCP OV 2018), Percent weight change: -8.2          Edema: No data recorded   Estimated Daily Nutrient Needs:  Energy (kcal/day): 5441-3164 (Kcal/kg (20-25), Weight Used:  (86.7 kg bed scale 9/24))  Protein (g/day): 103-172 (1.2-2 grams/kg) Weight Used: (Admission (stated 86.2 kg))  Fluid (ml/day): ( (minimize or per MD))    Nutrition Diagnosis:   · Inadequate oral intake related to impaired respiratory function as evidenced by nutrition support-parenteral nutrition, NPO or clear liquid status due to medical condition    Nutrition Interventions:   Food and/or Nutrient Delivery: Continue NPO, Modify parenteral nutrition     Coordination of Nutrition Care: Continue to monitor while inpatient      Goals:   Previous Goal Met: Progressing toward goal(s)  Active Goal: PN intake >50% of estimated needs within 3 days    Nutrition Monitoring and Evaluation:      Food/Nutrient Intake Outcomes: Parenteral nutrition intake/tolerance  Physical Signs/Symptoms Outcomes: Biochemical data, Weight (respiratory status)    Discharge Planning:     Too soon to determine    Nani Alva RD, LD, [facility]    Disaster Mode Active

## 2021-09-25 NOTE — PROGRESS NOTES
Bedside and Verbal shift change report given to [address] (oncoming nurse) by Dhaval Turner RN (offgoing nurse). Report included the following information SBAR, Kardex, Intake/Output, MAR, Recent Results, Med Rec Status, Cardiac Rhythm SB 49 and Alarm Parameters . Remains on BiPAP 100% SpO2 91%. TPN, Precedex and lipids infusing.

## 2021-09-25 NOTE — PROGRESS NOTES
[facility] Critical Care Note[de-identified] 9/25/2021  [address], [address]  Admission Date: 9/20/2021     Length of Stay: 5 days    Background: Patient is a 42 y.o.  male seen and evaluated at the request of Dr. Adrienne Bledsoe for dyspnea and COVID 19. The patient presented to the ED with c/o worsening shortness of breath for almost two weeks. The patient tested positive for COVID at [facility] 9/10/2021. The patient c/o sinus congestion and cough. EMS was unable to  O2 sat at home and patient was placed on 15L O2 with improvement of O2 sats in the 80s, no fever noted on arrival to ED  CXR reveals bilateral atypical pneumonia, CRP 9.1, ABG reveals pH 7.43, CO2 37.1, O2 87, HCO3 24.6.      The pt is unvaccinated. He has had fever and difficulty taking deep breaths. He reports some pain with breathing but no hemoptysis. He has not had any purulent sputum.        Notable PMH:  has no past medical history on file. 24 Hour events: PICC placed yesterday and alimentation started. Weaned to 75% BIPAP. Afebrile. CRP yesterday down to 1.2. Up in chair this AM but now back in bed. Calm overnight. ROS: unable to obtain/negative except as listed elsewhere. Lines: (insertion date)   ETT: (N/A)  [place]: (N/A)  OGT: (N/A)  Central line: (N/A)  Arterial Line (N/A)    Drips: current dose (range)    None  Precedex Dose (mcg/kg/hr): 0.2 mcg/kg/hr     Pertinent Exam:         Blood pressure 98/61, pulse (!) 49, temperature 97.7 °F (36.5 °C), resp. rate 22, height 6' (1.829 m), weight 191 lb 2.2 oz (86.7 kg), SpO2 94 %. Intake/Output Summary (Last 24 hours) at 9/25/2021 0848  Last data filed at 9/25/2021 0521  Gross per 24 hour   Intake 1606 ml   Output 1775 ml   Net -169 ml     Constitutional:  Comfortable on BIPAP.  Sat 94% on 75%  EENMT:  Sclera clear, pupils equal, oral mucosa moist  Respiratory: Decreased BS with scattered rhonchi  Cardiovascular:  RRR  Gastrointestinal:  soft with no tenderness; positive bowel sounds present  Musculoskeletal:  warm with no cyanosis, no lower extremity edema  Skin:  no jaundice or ecchymosis  Neurologic: awakens and non focal  Psychiatric: calm    CXR:     9/25: not done yet as pt was in chair      9/23, 22:          9/21, 20: Improved aeration bilaterally. Recent Labs     09/25/21  0306 09/24/21  0310 09/23/21  1053   WBC 1.8* 1.6* 2.6*   HGB 13.3* 13.9 14.6   HCT 39.4* 41.9 45.0    369 375     Recent Labs     09/25/21  0514 09/24/21  0310 09/23/21  1053    140 141   K 4.3 4.2 3.7    105 105   CO2 27 26 26   GLU 96 132* 66   BUN 26* 21 22   CREA 0.90 0.69* 0.74*   MG 2.4 2.4  --    CA 9.0 8.7 8.9   PHOS 3.6 4.3  --      Recent Labs     09/24/21  0310   CRP 1.2*     Recent Labs     09/25/21  0525 09/24/21  2353 09/24/21  1818   GLUCPOC 94 125* 114*     ECHO: No results found for this or any previous visit. Results     ** No results found for the last 336 hours. **        Inpat Anti-Infectives (From admission, onward)    None        Ventilator Settings:  Ideal body weight: 77.6 kg (171 lb 1.2 oz)   Mode FIO2 Rate Tidal Volume Pressure PEEP      75 %               Peak airway pressure:         Minute ventilation: 22 l/min  ABG:  No results for input(s): PH, PCO2, PO2, HCO3, PHI, PCO2I, PO2I, HCO3I in the last 72 hours. Assessment and Plan:  (Medical Decision Making)   Impression: 42 y.o. male with acute hypoxemic RF due to COVID 19 pneumonia. NEURO:   Sedation: None  Analgesia: None  Paralytics: None  CV:   Volume Status: Appears euvolemic  PULM:   Acute hypoxemic/hypercapneic respiratory failure:  Currently on 75%. Can wean to 70%. Try to wean further with mobilization. Severe ARDS 2/2 COVID: On high dose decadron and got Actemra. High dose decadron for 5 doses completed yesterday and now on 10 mg for 5 days. RENAL:  ЕКАТЕРИНА: Cr 0.90  Lactic acidosis: AG 9.    Electrolytes: BS OK  GI:   Nutrition: TPN  HEME:   Leukopenia: WBC up to 1.6. May need hematology to see if persists  Anemia: N/A  Thrombocytopenia: N/A  Anticoagulation: lovenox 40 q12  ID:   COVID-19: decadron down to 10 mg daily and got Actemra. ENDO:   DM: SSI  Skin: no decub, turns, preventive care  Prophy: H2B, lovenox    Wife Elenita Zarate [phone] was just updated by nurse.      Full Code    The patient is critically ill with respiratory failure, circulatory failure and requires high complexity decision making for assessment and support including frequent ventilator adjustment , frequent evaluation and titration of therapies , application of advanced monitoring technologies and extensive interpretation of multiple databases        Beatriz Mustafa MD

## 2021-09-26 LAB
ANION GAP SERPL CALC-SCNC: 7 MMOL/L (ref 7–16)
ARTERIAL PATENCY WRIST A: ABNORMAL
BASE DEFICIT BLD-SCNC: 0.2 MMOL/L
BASOPHILS # BLD: 0 K/UL (ref 0–0.2)
BASOPHILS NFR BLD: 1 % (ref 0–2)
BDY SITE: ABNORMAL
BUN SERPL-MCNC: 28 MG/DL (ref 6–23)
CALCIUM SERPL-MCNC: 8.6 MG/DL (ref 8.3–10.4)
CHLORIDE SERPL-SCNC: 109 MMOL/L (ref 98–107)
CO2 SERPL-SCNC: 24 MMOL/L (ref 21–32)
CREAT SERPL-MCNC: 0.71 MG/DL (ref 0.8–1.5)
DIFFERENTIAL METHOD BLD: ABNORMAL
EOSINOPHIL # BLD: 0.1 K/UL (ref 0–0.8)
EOSINOPHIL NFR BLD: 5 % (ref 0.5–7.8)
ERYTHROCYTE [DISTWIDTH] IN BLOOD BY AUTOMATED COUNT: 12.1 % (ref 11.9–14.6)
GAS FLOW.O2 O2 DELIVERY SYS: ABNORMAL L/MIN
GLUCOSE BLD STRIP.AUTO-MCNC: 109 MG/DL (ref 65–100)
GLUCOSE BLD STRIP.AUTO-MCNC: 122 MG/DL (ref 65–100)
GLUCOSE BLD STRIP.AUTO-MCNC: 127 MG/DL (ref 65–100)
GLUCOSE BLD STRIP.AUTO-MCNC: 135 MG/DL (ref 65–100)
GLUCOSE BLD STRIP.AUTO-MCNC: 93 MG/DL (ref 65–100)
GLUCOSE SERPL-MCNC: 125 MG/DL (ref 65–100)
HCO3 BLD-SCNC: 23.2 MMOL/L (ref 22–26)
HCT VFR BLD AUTO: 42 % (ref 41.1–50.3)
HGB BLD-MCNC: 14.3 G/DL (ref 13.6–17.2)
IMM GRANULOCYTES # BLD AUTO: 0.1 K/UL (ref 0–0.5)
IMM GRANULOCYTES NFR BLD AUTO: 5 % (ref 0–5)
LYMPHOCYTES # BLD: 0.6 K/UL (ref 0.5–4.6)
LYMPHOCYTES NFR BLD: 26 % (ref 13–44)
MAGNESIUM SERPL-MCNC: 2.3 MG/DL (ref 1.8–2.4)
MCH RBC QN AUTO: 30.4 PG (ref 26.1–32.9)
MCHC RBC AUTO-ENTMCNC: 34 G/DL (ref 31.4–35)
MCV RBC AUTO: 89.4 FL (ref 79.6–97.8)
MONOCYTES # BLD: 0.2 K/UL (ref 0.1–1.3)
MONOCYTES NFR BLD: 10 % (ref 4–12)
NEUTS SEG # BLD: 1.2 K/UL (ref 1.7–8.2)
NEUTS SEG NFR BLD: 54 % (ref 43–78)
NRBC # BLD: 0 K/UL (ref 0–0.2)
O2/TOTAL GAS SETTING VFR VENT: 90 %
PCO2 BLD: 33.7 MMHG (ref 35–45)
PEEP RESPIRATORY: 14 CMH2O
PH BLD: 7.45 [PH] (ref 7.35–7.45)
PHOSPHATE SERPL-MCNC: 3.3 MG/DL (ref 2.5–4.5)
PLATELET # BLD AUTO: 353 K/UL (ref 150–450)
PMV BLD AUTO: 9.5 FL (ref 9.4–12.3)
PO2 BLD: 67 MMHG (ref 75–100)
POTASSIUM SERPL-SCNC: 3.9 MMOL/L (ref 3.5–5.1)
RBC # BLD AUTO: 4.7 M/UL (ref 4.23–5.6)
SAO2 % BLD: 94 % (ref 95–98)
SERVICE CMNT-IMP: ABNORMAL
SERVICE CMNT-IMP: NORMAL
SODIUM SERPL-SCNC: 140 MMOL/L (ref 136–145)
SPECIMEN TYPE: ABNORMAL
VENTILATION MODE VENT: ABNORMAL
WBC # BLD AUTO: 2.1 K/UL (ref 4.3–11.1)

## 2021-09-26 PROCEDURE — 74011000258 HC RX REV CODE- 258: Performed by: INTERNAL MEDICINE

## 2021-09-26 PROCEDURE — 83735 ASSAY OF MAGNESIUM: CPT

## 2021-09-26 PROCEDURE — 65620000000 HC RM CCU GENERAL

## 2021-09-26 PROCEDURE — 2709999900 HC NON-CHARGEABLE SUPPLY

## 2021-09-26 PROCEDURE — 36620 INSERTION CATHETER ARTERY: CPT

## 2021-09-26 PROCEDURE — 74011250637 HC RX REV CODE- 250/637: Performed by: INTERNAL MEDICINE

## 2021-09-26 PROCEDURE — 74011250636 HC RX REV CODE- 250/636: Performed by: INTERNAL MEDICINE

## 2021-09-26 PROCEDURE — 85025 COMPLETE CBC W/AUTO DIFF WBC: CPT

## 2021-09-26 PROCEDURE — 94660 CPAP INITIATION&MGMT: CPT

## 2021-09-26 PROCEDURE — 80048 BASIC METABOLIC PNL TOTAL CA: CPT

## 2021-09-26 PROCEDURE — 74011000250 HC RX REV CODE- 250: Performed by: INTERNAL MEDICINE

## 2021-09-26 PROCEDURE — 99233 SBSQ HOSP IP/OBS HIGH 50: CPT | Performed by: INTERNAL MEDICINE

## 2021-09-26 PROCEDURE — 84100 ASSAY OF PHOSPHORUS: CPT

## 2021-09-26 PROCEDURE — 82803 BLOOD GASES ANY COMBINATION: CPT

## 2021-09-26 RX ORDER — HYDROCODONE BITARTRATE AND ACETAMINOPHEN 7.5; 325 MG/1; MG/1
1 TABLET ORAL EVERY 6 HOURS
Status: DISCONTINUED | OUTPATIENT
Start: 2021-09-26 | End: 2021-10-04 | Stop reason: HOSPADM

## 2021-09-26 RX ADMIN — DEXMEDETOMIDINE HYDROCHLORIDE 0.2 MCG/KG/HR: 400 INJECTION INTRAVENOUS at 03:25

## 2021-09-26 RX ADMIN — DEXAMETHASONE SODIUM PHOSPHATE 10 MG: 10 INJECTION, SOLUTION INTRAMUSCULAR; INTRAVENOUS at 13:05

## 2021-09-26 RX ADMIN — Medication 10 ML: at 21:53

## 2021-09-26 RX ADMIN — Medication 10 ML: at 21:54

## 2021-09-26 RX ADMIN — LORAZEPAM 1 MG: 2 INJECTION INTRAMUSCULAR; INTRAVENOUS at 21:53

## 2021-09-26 RX ADMIN — MORPHINE SULFATE 2 MG: 2 INJECTION, SOLUTION INTRAMUSCULAR; INTRAVENOUS at 19:38

## 2021-09-26 RX ADMIN — HYDROCODONE BITARTRATE AND ACETAMINOPHEN 1 TABLET: 7.5; 325 TABLET ORAL at 17:19

## 2021-09-26 RX ADMIN — FAMOTIDINE 20 MG: 10 INJECTION INTRAVENOUS at 21:53

## 2021-09-26 RX ADMIN — Medication 10 ML: at 05:54

## 2021-09-26 RX ADMIN — MORPHINE SULFATE 2 MG: 2 INJECTION, SOLUTION INTRAMUSCULAR; INTRAVENOUS at 12:08

## 2021-09-26 RX ADMIN — Medication 10 ML: at 05:55

## 2021-09-26 RX ADMIN — ENOXAPARIN SODIUM 40 MG: 40 INJECTION SUBCUTANEOUS at 17:17

## 2021-09-26 RX ADMIN — Medication 10 ML: at 13:07

## 2021-09-26 RX ADMIN — SOYBEAN OIL 250 ML: 20 INJECTION, SOLUTION INTRAVENOUS at 17:19

## 2021-09-26 RX ADMIN — CALCIUM GLUCONATE: 98 INJECTION, SOLUTION INTRAVENOUS at 17:16

## 2021-09-26 RX ADMIN — ENOXAPARIN SODIUM 40 MG: 40 INJECTION SUBCUTANEOUS at 05:54

## 2021-09-26 RX ADMIN — HYDROCODONE BITARTRATE AND ACETAMINOPHEN 1 TABLET: 7.5; 325 TABLET ORAL at 13:06

## 2021-09-26 RX ADMIN — HYDROCODONE BITARTRATE AND ACETAMINOPHEN 1 TABLET: 7.5; 325 TABLET ORAL at 23:08

## 2021-09-26 RX ADMIN — FAMOTIDINE 20 MG: 10 INJECTION INTRAVENOUS at 08:44

## 2021-09-26 NOTE — PROGRESS NOTES
Bedside and Verbal shift change report given to [address] (oncoming nurse) by Ajit Gee RN (offgoing nurse). Report included the following information SBAR, Kardex, Procedure Summary, Intake/Output, MAR, Recent Results, Med Rec Status, Cardiac Rhythm SR 62 and Alarm Parameters  Remains on CPAP mask 14 FiO2 60%. SpO2 93%. TPN, Lipids and precedex infusing.

## 2021-09-26 NOTE — PROGRESS NOTES
Comprehensive Nutrition Assessment  This assessment was completed remotely   Type and Reason for Visit: Reassess  TPN Management (Pulmonary)    Nutrition Recommendations/Plan:   Parenteral Nutrition:  Change PN rate  14%DEX/ 8%AA 1.56 L/d at 65 ml/hr with 250 ml 20% Lipids daily  To provide: 1742 kcal/d (100% of needs), 125 grams of protein/d (100% of needs), 218 grams of CHO/d and 1810 ml of total volume/d. Lytes/L:   Sodium 60 meq (60 meq NaCl), Potassium 20 meq (0 meq KCl, 20 meq KPO4), 5 meq Mg, 4.5 meq Calcium  Other additives: MTE, MVI, 100 mg thiamin (day 3/7)  Nutritional Supplement Therapy:   Active electrolyte replacement per nutrition support protocols  Replacement indicated:  None  Labs:   BMP daily  Mg MWF  Phos MWF     Consider stop POC Glucoses/SSI 9/27 if remain < 180 mg/dl with TPN at goal rate. Malnutrition Assessment:  Malnutrition Status: At risk for malnutrition (specify) (COVID symptoms x 2 weeks PTA, NPO since admission 9/20. Unable to verify weight loss but suspected)    Nutrition Assessment:   Nutrition History: 9/24: Unable to obtain from pt. Pt with COVID symptoms for ~ 2 weeks PTA and thus suspected decreased po intake. NPO since admission d/t respiratory status      Nutrition Background: No known PMH. Presentedwith 12d h/o Covid-like symptoms, including cough, sinus congestion, headaches, fever and over the last several days PTA had progressive SOB. Tested COVID + 9/10. Admitted with acute respiratory failure with hypoxia, COVID-19 pneumonia  Daily Update:  Pt weaned to 70% BIPAP, PPN started 9/24 d/t inability to tolerate po intake d/t respiratory status. PICC line placed at 1550 9/24 and PPN changed to CPN 9/25. Abdominal Status (last documented): Intact abdomen with Hypoactive  bowel sounds. Last BM 09/25/21.   Pertinent Medications: decadron ( dose decreased from 20 mg every day to 10 mg every day on 9/25), prn dulcolax suppository ( dose given 9/24 PM), precedex, pepcid, ssi (none yesterday or thus far today),   Pertinent Labs:   Lab Results   Component Value Date/Time    Sodium 140 09/26/2021 03:00 AM    Potassium 3.9 09/26/2021 03:00 AM    Chloride 109 (H) 09/26/2021 03:00 AM    CO2 24 09/26/2021 03:00 AM    Anion gap 7 09/26/2021 03:00 AM    Glucose 125 (H) 09/26/2021 03:00 AM    BUN 28 (H) 09/26/2021 03:00 AM    Creatinine 0.71 (L) 09/26/2021 03:00 AM    Calcium 8.6 09/26/2021 03:00 AM    Albumin 2.5 (L) 09/20/2021 01:30 PM    Magnesium 2.3 09/26/2021 03:00 AM    Phosphorus 3.3 09/26/2021 03:00 AM     Lab Results   Component Value Date/Time    Glucose 125 (H) 09/26/2021 03:00 AM    Glucose (POC) 109 (H) 09/26/2021 06:10 AM    Glucose (POC) 135 (H) 09/25/2021 11:54 PM    Glucose (POC) 122 (H) 09/25/2021 05:21 PM    Glucose (POC) 41 (L) 09/25/2021 12:13 PM    Glucose (POC) 94 09/25/2021 05:25 AM    Glucose (POC) 125 (H) 09/24/2021 11:53 PM     Remarks:  Increased glucoses likely d/t steroids, no h/o DM. Still at increased risk for refeeding with increased CHO intake. Once TPN at goal, may need decreased Mg and Phos in TPN and could discontinue pOC Glu oxs and SSI if remain < 180 mg/dl  Nutrition Related Findings:   9/24: PPN started, PICC lined placed after 1330. 9/25: PPN changed to CPN (1 L 14%DEX/8%AA). 9/26: TPN dionna increased to 65/hr/1.56L/d      Current Nutrition Therapies:  DIET NPO  Current Parenteral Nutrition Orders:  · Type and Formula: 14%DEX/8%AA   · Lipids: 250ml, Daily  · Duration: Continuous  · Rate/Volume: 45 ml/hr or 1L/d  · Current PN Order Provides: 1296 kcal/d (75% of needs), 80 grams of protein/d (78% of needs), 140 grams of CHO/d and 1330 ml of total volume/d  · Goal PN Orders Provides:        Current Intake:   Average Meal Intake: NPO        Anthropometric Measures:  Height: 6' (182.9 cm)  Current Body Wt: 83.6 kg (184 lb 4.9 oz) (9/25), Weight source: Bed scale  BMI: 25, Overweight (BMI 25.0-29. 9)  Admission Body Weight: 190 lb 0.6 oz (stated)  Ideal Body Weight (lbs) (Calculated): 178 lbs (81 kg),    Usual Body Wt: 93.9 kg (207 lb) (9/10/2021 as PCP office, 190# at PCP OV 2018),         Edema: No data recorded   Estimated Daily Nutrient Needs:  Energy (kcal/day): 2750-1960 (Kcal/kg (20-25), Weight Used:  (86.7 kg bed scale 9/24))  Protein (g/day): 103-172 (1.2-2 grams/kg) Weight Used: (Admission (stated 86.2 kg))  Fluid (ml/day): ( (minimize or per MD))    Nutrition Diagnosis:   · Inadequate oral intake related to impaired respiratory function as evidenced by nutrition support-parenteral nutrition, NPO or clear liquid status due to medical condition    Nutrition Interventions:   Food and/or Nutrient Delivery: Continue NPO, Modify parenteral nutrition     Coordination of Nutrition Care: Continue to monitor while inpatient      Goals:   Previous Goal Met: Goal(s) achieved  Active Goal: PN intake to meet estimated needs within 1 day    Nutrition Monitoring and Evaluation:      Food/Nutrient Intake Outcomes: Parenteral nutrition intake/tolerance  Physical Signs/Symptoms Outcomes: Biochemical data, Weight (respiratory status)    Discharge Planning:     Too soon to determine    Ernesto Branham RD, LD, [address] [facility]    Disaster Mode Active

## 2021-09-26 NOTE — PROGRESS NOTES
[facility] Critical Care Note[de-identified] 9/26/2021  [address], [address]  Admission Date: 9/20/2021     Length of Stay: 6 days    Background: Patient is a 42 y.o.  male seen and evaluated at the request of Dr. Marlin Mckeon for dyspnea and COVID 19. The patient presented to the ED with c/o worsening shortness of breath for almost two weeks. The patient tested positive for COVID at [facility] 9/10/2021. The patient c/o sinus congestion and cough. EMS was unable to  O2 sat at home and patient was placed on 15L O2 with improvement of O2 sats in the 80s, no fever noted on arrival to ED  CXR reveals bilateral atypical pneumonia, CRP 9.1, ABG reveals pH 7.43, CO2 37.1, O2 87, HCO3 24.6.      The pt is unvaccinated. He has had fever and difficulty taking deep breaths. He reports some pain with breathing but no hemoptysis. He has not had any purulent sputum.        Notable PMH:  has no past medical history on file. 24 Hour events:  Oxygenation better and now down to 70% on CPAP 10 and looks better. Afebrile. Calm. ROS: unable to obtain/negative except as listed elsewhere. Lines: (insertion date)   ETT: (N/A)  Leggett: (N/A)  OGT: (N/A)  Central line: (N/A)  Arterial Line (N/A)    Drips: current dose (range)    None  Precedex Dose (mcg/kg/hr): 0.2 mcg/kg/hr     Pertinent Exam:         Blood pressure (!) 113/58, pulse (!) 51, temperature 97.5 °F (36.4 °C), resp. rate 15, height 6' (1.829 m), weight 184 lb 4.9 oz (83.6 kg), SpO2 96 %. Intake/Output Summary (Last 24 hours) at 9/26/2021 0906  Last data filed at 9/26/2021 0611  Gross per 24 hour   Intake 1485.5 ml   Output 1450 ml   Net 35.5 ml     Constitutional:  Comfortable on BIPAP.  Sat 96% on 70%  EENMT:  Sclera clear, pupils equal, oral mucosa moist  Respiratory: Decreased BS with scattered rhonchi  Cardiovascular:  RRR  Gastrointestinal:  soft with no tenderness; positive bowel sounds present  Musculoskeletal:  warm with no cyanosis, no lower extremity edema  Skin:  no jaundice or ecchymosis  Neurologic: awakens and non focal  Psychiatric: calm    CXR:     9/25, 23:             9/23, 22:          9/21, 20:          Recent Labs     09/26/21  0300 09/25/21  0306 09/24/21  0310   WBC 2.1* 1.8* 1.6*   HGB 14.3 13.3* 13.9   HCT 42.0 39.4* 41.9    342 369     Recent Labs     09/26/21  0300 09/25/21  0514 09/24/21  0310    141 140   K 3.9 4.3 4.2   * 105 105   CO2 24 27 26   * 96 132*   BUN 28* 26* 21   CREA 0.71* 0.90 0.69*   MG 2.3 2.4 2.4   CA 8.6 9.0 8.7   PHOS 3.3 3.6 4.3     Recent Labs     09/24/21  0310   CRP 1.2*     Recent Labs     09/26/21  0610 09/25/21  2354 09/25/21  1721   GLUCPOC 109* 135* 122*     ECHO: No results found for this or any previous visit. Results     ** No results found for the last 336 hours. **        Inpat Anti-Infectives (From admission, onward)    None        Ventilator Settings:  Ideal body weight: 77.6 kg (171 lb 1.2 oz)   Mode FIO2 Rate Tidal Volume Pressure PEEP      70 %               Peak airway pressure:         Minute ventilation: 16.7 l/min  ABG:  Recent Labs     09/26/21  0258   PHI 7.45   PCO2I 33.7*   PO2I 67*   HCO3I 23.2     Assessment and Plan:  (Medical Decision Making)   Impression: 42 y.o. male with acute hypoxemic RF due to COVID 19 pneumonia. NEURO:   Sedation: None  Analgesia: None  Paralytics: None  CV:   Volume Status: Appears euvolemic  PULM:   Acute hypoxemic/hypercapneic respiratory failure:  Currently on 70% and CPAP 10. VE about 10. Try to wean further with mobilization. Severe ARDS 2/2 COVID: On high dose decadron and got Actemra. High dose decadron for 5 doses completed yesterday and now on 10 mg for 5 days. RENAL:  ЕКАТЕРИНА: Cr 0.71  Lactic acidosis: AG 9. Electrolytes: BS OK  GI:   Nutrition: TPN  HEME:   Leukopenia: WBC up to 2.1.   May need hematology to see if persists  Anemia: N/A  Thrombocytopenia: N/A  Anticoagulation: lovenox 40 q12  ID: COVID-19: decadron down to 10 mg daily and got Actemra. ENDO:   DM: SSI  Skin: no decub, turns, preventive care  Prophy: H2B, lovenox    Wife Simon Loza [phone] was just updated by nurse.      Full Code    The patient is critically ill with respiratory failure, circulatory failure and requires high complexity decision making for assessment and support including frequent ventilator adjustment , frequent evaluation and titration of therapies , application of advanced monitoring technologies and extensive interpretation of multiple databases        Kenn Mcnair MD

## 2021-09-26 NOTE — PROGRESS NOTES
Bedside and verbal shift change report received from  [facility] (offgoing nurse). Report included the following information SBAR, Kardex, Intake/Output, MAR, Recent Results, Med Rec Status, Cardiac Rhythm SR/SB, Alarm Parameters  and Dual Neuro Assessment. Dual skin assessment completed at bedside: WDL (list pertinent skin assessment findings)    Dual verification of gtts completed (name of gtts verified): n/a    Pt is resting on CPAP at 60% and precedex at 0.2.

## 2021-09-27 LAB
ANION GAP SERPL CALC-SCNC: 8 MMOL/L (ref 7–16)
ARTERIAL PATENCY WRIST A: ABNORMAL
BASE DEFICIT BLD-SCNC: 1.4 MMOL/L
BASOPHILS # BLD: 0 K/UL (ref 0–0.2)
BASOPHILS NFR BLD: 0 % (ref 0–2)
BDY SITE: ABNORMAL
BUN SERPL-MCNC: 27 MG/DL (ref 6–23)
CALCIUM SERPL-MCNC: 8.4 MG/DL (ref 8.3–10.4)
CHLORIDE SERPL-SCNC: 109 MMOL/L (ref 98–107)
CO2 SERPL-SCNC: 24 MMOL/L (ref 21–32)
CREAT SERPL-MCNC: 0.67 MG/DL (ref 0.8–1.5)
DIFFERENTIAL METHOD BLD: ABNORMAL
EOSINOPHIL # BLD: 0.1 K/UL (ref 0–0.8)
EOSINOPHIL NFR BLD: 3 % (ref 0.5–7.8)
ERYTHROCYTE [DISTWIDTH] IN BLOOD BY AUTOMATED COUNT: 12.4 % (ref 11.9–14.6)
GAS FLOW.O2 O2 DELIVERY SYS: ABNORMAL L/MIN
GLUCOSE BLD STRIP.AUTO-MCNC: 112 MG/DL (ref 65–100)
GLUCOSE SERPL-MCNC: 122 MG/DL (ref 65–100)
HCO3 BLD-SCNC: 23.1 MMOL/L (ref 22–26)
HCT VFR BLD AUTO: 42 % (ref 41.1–50.3)
HGB BLD-MCNC: 14.4 G/DL (ref 13.6–17.2)
IMM GRANULOCYTES # BLD AUTO: 0.1 K/UL (ref 0–0.5)
IMM GRANULOCYTES NFR BLD AUTO: 5 % (ref 0–5)
LYMPHOCYTES # BLD: 0.5 K/UL (ref 0.5–4.6)
LYMPHOCYTES NFR BLD: 19 % (ref 13–44)
MAGNESIUM SERPL-MCNC: 2.2 MG/DL (ref 1.8–2.4)
MCH RBC QN AUTO: 30.5 PG (ref 26.1–32.9)
MCHC RBC AUTO-ENTMCNC: 34.3 G/DL (ref 31.4–35)
MCV RBC AUTO: 89 FL (ref 79.6–97.8)
MONOCYTES # BLD: 0.2 K/UL (ref 0.1–1.3)
MONOCYTES NFR BLD: 7 % (ref 4–12)
NEUTS SEG # BLD: 1.8 K/UL (ref 1.7–8.2)
NEUTS SEG NFR BLD: 65 % (ref 43–78)
NRBC # BLD: 0 K/UL (ref 0–0.2)
O2/TOTAL GAS SETTING VFR VENT: 55 %
PCO2 BLD: 37.1 MMHG (ref 35–45)
PEEP RESPIRATORY: 14 CMH2O
PH BLD: 7.4 [PH] (ref 7.35–7.45)
PHOSPHATE SERPL-MCNC: 2.8 MG/DL (ref 2.5–4.5)
PLATELET # BLD AUTO: 325 K/UL (ref 150–450)
PMV BLD AUTO: 9.6 FL (ref 9.4–12.3)
PO2 BLD: 63 MMHG (ref 75–100)
POTASSIUM SERPL-SCNC: 3.8 MMOL/L (ref 3.5–5.1)
PRESSURE SUPPORT SETTING VENT: 18 CMH2O
RBC # BLD AUTO: 4.72 M/UL (ref 4.23–5.6)
SAO2 % BLD: 92 % (ref 95–98)
SERVICE CMNT-IMP: ABNORMAL
SODIUM SERPL-SCNC: 141 MMOL/L (ref 136–145)
SPECIMEN TYPE: ABNORMAL
VENTILATION MODE VENT: ABNORMAL
WBC # BLD AUTO: 2.8 K/UL (ref 4.3–11.1)

## 2021-09-27 PROCEDURE — 99232 SBSQ HOSP IP/OBS MODERATE 35: CPT | Performed by: INTERNAL MEDICINE

## 2021-09-27 PROCEDURE — 77010033711 HC HIGH FLOW OXYGEN

## 2021-09-27 PROCEDURE — 74011250637 HC RX REV CODE- 250/637: Performed by: INTERNAL MEDICINE

## 2021-09-27 PROCEDURE — 85025 COMPLETE CBC W/AUTO DIFF WBC: CPT

## 2021-09-27 PROCEDURE — 84100 ASSAY OF PHOSPHORUS: CPT

## 2021-09-27 PROCEDURE — 65620000000 HC RM CCU GENERAL

## 2021-09-27 PROCEDURE — 74011000250 HC RX REV CODE- 250: Performed by: INTERNAL MEDICINE

## 2021-09-27 PROCEDURE — 74011250636 HC RX REV CODE- 250/636: Performed by: INTERNAL MEDICINE

## 2021-09-27 PROCEDURE — 80048 BASIC METABOLIC PNL TOTAL CA: CPT

## 2021-09-27 PROCEDURE — 82962 GLUCOSE BLOOD TEST: CPT

## 2021-09-27 PROCEDURE — 82803 BLOOD GASES ANY COMBINATION: CPT

## 2021-09-27 PROCEDURE — 83735 ASSAY OF MAGNESIUM: CPT

## 2021-09-27 PROCEDURE — 94660 CPAP INITIATION&MGMT: CPT

## 2021-09-27 RX ADMIN — Medication 10 ML: at 21:00

## 2021-09-27 RX ADMIN — HYDROCODONE BITARTRATE AND ACETAMINOPHEN 1 TABLET: 7.5; 325 TABLET ORAL at 11:43

## 2021-09-27 RX ADMIN — DEXAMETHASONE SODIUM PHOSPHATE 10 MG: 10 INJECTION, SOLUTION INTRAMUSCULAR; INTRAVENOUS at 13:29

## 2021-09-27 RX ADMIN — Medication 10 ML: at 05:13

## 2021-09-27 RX ADMIN — ENOXAPARIN SODIUM 40 MG: 40 INJECTION SUBCUTANEOUS at 05:13

## 2021-09-27 RX ADMIN — ENOXAPARIN SODIUM 40 MG: 40 INJECTION SUBCUTANEOUS at 17:31

## 2021-09-27 RX ADMIN — Medication 10 ML: at 13:03

## 2021-09-27 RX ADMIN — Medication 10 ML: at 13:01

## 2021-09-27 RX ADMIN — HYDROCODONE BITARTRATE AND ACETAMINOPHEN 1 TABLET: 7.5; 325 TABLET ORAL at 17:31

## 2021-09-27 RX ADMIN — MORPHINE SULFATE 2 MG: 2 INJECTION, SOLUTION INTRAMUSCULAR; INTRAVENOUS at 02:15

## 2021-09-27 RX ADMIN — FAMOTIDINE 20 MG: 10 INJECTION INTRAVENOUS at 08:13

## 2021-09-27 RX ADMIN — MORPHINE SULFATE 2 MG: 2 INJECTION, SOLUTION INTRAMUSCULAR; INTRAVENOUS at 08:13

## 2021-09-27 RX ADMIN — HYDROCODONE BITARTRATE AND ACETAMINOPHEN 1 TABLET: 7.5; 325 TABLET ORAL at 23:18

## 2021-09-27 RX ADMIN — Medication 10 ML: at 13:02

## 2021-09-27 RX ADMIN — Medication 1 SPRAY: at 09:30

## 2021-09-27 RX ADMIN — FAMOTIDINE 20 MG: 10 INJECTION INTRAVENOUS at 20:57

## 2021-09-27 NOTE — PROGRESS NOTES
Bedside and verbal shift change report received from  [address] (offgoing nurse). Report included the following information SBAR, Kardex, ED Summary, Procedure Summary, Intake/Output, MAR, Recent Results, Med Rec Status, Cardiac Rhythm NSR/Sinus Curtis Ba and Alarm Parameters . Dual skin assessment completed at bedside: WDL. No pressure sores and/or injuries noted at this time. Dual verification of gtts completed:  N/A     Patient resting on BIPAP 55%.

## 2021-09-27 NOTE — PROGRESS NOTES
Chart reviewed as pt remains CCU covid positive isolation. Now on Airvo 100%. No gtts. CM following for d/c needs/POC when stable. LOS 7 days.

## 2021-09-27 NOTE — PROGRESS NOTES
[facility] Critical Care Note[de-identified] 9/27/2021  [address] [facility], [address]  Admission Date: 9/20/2021     Length of Stay: 7 days    Background: Patient is a 42 y.o.  male seen and evaluated at the request of Dr. Adrienne Bledsoe for dyspnea and COVID 19. The patient presented to the ED with c/o worsening shortness of breath for almost two weeks. The patient tested positive for COVID at [facility] 9/10/2021. The patient c/o sinus congestion and cough. EMS was unable to  O2 sat at home and patient was placed on 15L O2 with improvement of O2 sats in the 80s, no fever noted on arrival to ED  CXR reveals bilateral atypical pneumonia, CRP 9.1, ABG reveals pH 7.43, CO2 37.1, O2 87, HCO3 24.6.      The pt is unvaccinated. He has had fever and difficulty taking deep breaths. He reports some pain with breathing but no hemoptysis. He has not had any purulent sputum.        Notable PMH:  has no past medical history on file. 24 Hour events:  Weaned down to [employer] he feels better       ROS: negative except as listed elsewhere. Lines: (insertion date)   ETT: (N/A)  Leggett: (N/A)  OGT: (N/A)  Central line: (N/A)  Arterial Line (N/A)    Drips: current dose (range)    None  Precedex Dose (mcg/kg/hr): 0 mcg/kg/hr     Pertinent Exam:         Blood pressure 115/63, pulse 96, temperature 98.8 °F (37.1 °C), resp. rate 29, height 6' (1.829 m), weight 189 lb 6 oz (85.9 kg), SpO2 100 %. Intake/Output Summary (Last 24 hours) at 9/27/2021 1007  Last data filed at 9/27/2021 0634  Gross per 24 hour   Intake 1607.25 ml   Output 1475 ml   Net 132.25 ml     Constitutional:  Comfortable on BIPAP.  Sat 96% on 70%  EENMT:  Sclera clear, pupils equal, oral mucosa moist  Respiratory: Decreased BS with scattered rhonchi  Cardiovascular:  RRR  Gastrointestinal:  soft with no tenderness; positive bowel sounds present  Musculoskeletal:  warm with no cyanosis, no lower extremity edema  Skin:  no jaundice or ecchymosis  Neurologic: awakens and non focal  Psychiatric: calm    CXR:   None today    9/25, 23:             9/23, 22:          9/21, 20:          Recent Labs     09/27/21  0209 09/26/21  0300 09/25/21  0306   WBC 2.8* 2.1* 1.8*   HGB 14.4 14.3 13.3*   HCT 42.0 42.0 39.4*    353 342     Recent Labs     09/27/21  0209 09/26/21  0300 09/25/21  0514    140 141   K 3.8 3.9 4.3   * 109* 105   CO2 24 24 27   * 125* 96   BUN 27* 28* 26*   CREA 0.67* 0.71* 0.90   MG 2.2 2.3 2.4   CA 8.4 8.6 9.0   PHOS 2.8 3.3 3.6     No results for input(s): LAC, TROPHS, BNPNT, CRP in the last 72 hours. No lab exists for component: ESR  Recent Labs     09/27/21  0550 09/26/21  2309 09/26/21  1814   GLUCPOC 112* 122* 127*     ECHO: No results found for this or any previous visit. Results     ** No results found for the last 336 hours. **        Inpat Anti-Infectives (From admission, onward)    None        Ventilator Settings:  Ideal body weight: 77.6 kg (171 lb 1.2 oz)   Mode FIO2 Rate Tidal Volume Pressure PEEP      100 %               Peak airway pressure:         Minute ventilation: 11.2 l/min  ABG:  Recent Labs     09/27/21  0258 09/26/21  0258   PHI 7.40 7.45   PCO2I 37.1 33.7*   PO2I 63* 67*   HCO3I 23.1 23.2     Assessment and Plan:  (Medical Decision Making)   Impression: 42 y.o. male with acute hypoxemic RF due to COVID 19 pneumonia. NEURO:   Sedation: None  Analgesia: None  Paralytics: None  CV:   Volume Status: Appears euvolemic  PULM:   Acute hypoxemic/hypercapneic respiratory failure: Weaned to [facility], feels better hope that this trend continues    Severe ARDS 2/2 COVID: On high dose decadron and got Actemra. High dose decadron for 5 doses completed yesterday and now on 10 mg for 5 days. RENAL:  ЕКАТЕРИНА: Cr 0.71  Lactic acidosis: AG 9. Electrolytes: BS OK  GI:   Nutrition: TPN  HEME:   Leukopenia: WBC up to 2.1.   May need hematology to see if persists  Anemia: N/A  Thrombocytopenia: N/A  Anticoagulation: lovenox 40 q12  ID:   COVID-19: decadron down to 10 mg daily and got Actemra. Advised patient to get vaccinated 90 days after this diagnosis if completely recovered    ENDO:   DM: SSI  Skin: no decub, turns, preventive care  Prophy: H2B, lovenox    Wife Ravi Fox [phone] was just updated by nurse. Full Code    Overall better- may transfer to floor for further care in 24-48 h if continues to improve.         Sulma Scott MD

## 2021-09-27 NOTE — PROGRESS NOTES
Nutrition Note    RN reports pt all of breakfast and MD discontinued TPN/lipids.   Will continue ONS until po intake of meals is known to be consistently adequate    Electronically signed by Kerrie Sanz RD on 9/27/2021 at 1:25 PM    Contact: [phone]

## 2021-09-27 NOTE — PROGRESS NOTES
Bedside and verbal shift change report received from  Mig[address] (offgoing nurse). Report included the following information SBAR, Kardex, Intake/Output, MAR, Recent Results, Med Rec Status, Cardiac Rhythm SR, Alarm Parameters  and Dual Neuro Assessment. Dual skin assessment completed at bedside: WDL (list pertinent skin assessment findings)    Dual verification of gtts completed (name of gtts verified): n/a    Pt up in recliner. Pt on airvo at 85%, O2 95%.

## 2021-09-27 NOTE — PROGRESS NOTES
Comprehensive Nutrition Assessment  This assessment was completed remotely   Type and Reason for Visit: Reassess  TPN Management (Pulmonary)    Nutrition Recommendations/Plan:   Parenteral Nutrition:  Continue PN   14%DEX/ 8%AA 1.56 L/d at 65 ml/hr with 250 ml 20% Lipids daily  To provide: 1742 kcal/d (100% of needs), 125 grams of protein/d (100% of needs), 218 grams of CHO/d and 1810 ml of total volume/d. Lytes/L:   Sodium 60 meq (60 meq NaCl), Potassium 30 meq (0 meq KCl, 30 meq KPO4), 5 meq Mg, 4.5 meq Calcium  Other additives: MTE, MVI, 100 mg thiamin (day 4/7)  Nutritional Supplement Therapy:   Active electrolyte replacement per nutrition support protocols  Replacement indicated:  None  Labs:   BMP daily  Mg MWF  Phos MWF     Stop POC Glucoses/SSI   Enteral Nutrition:   Meals and Snacks:  Initiate current diet. Regular ordered this AM  Nutrition Supplement Therapy:  Medical food supplement therapy:  Initiate Ensure Enlive three times per day (this provides 350 kcal and 20 grams protein per bottle)       Malnutrition Assessment:  Malnutrition Status: At risk for malnutrition (specify) (COVID symptoms x 2 weeks PTA, NPO since admission 9/20. Unable to verify weight loss but suspected)    Nutrition Assessment:   Nutrition History: 9/24: Unable to obtain from pt. Pt with COVID symptoms for ~ 2 weeks PTA and thus suspected decreased po intake. NPO since admission d/t respiratory status      Nutrition Background: No known PMH. Presented with 12d h/o Covid-like symptoms, including cough, sinus congestion, headaches, fever and over the last several days PTA had progressive SOB. Tested COVID + 9/10. Admitted with acute respiratory failure with hypoxia and COVID-19 pneumonia  Daily Update:  Pt weaned to 50% BIPAP. PO diet started this AM.   PPN started 9/24 d/t inability to tolerate po intake d/t respiratory status. PICC line placed at 1550 9/24 and PPN changed to CPN 9/25. CPN reached goal rate 9/26.   Abdominal Status (last documented): Intact, Semi-soft abdomen with Active  bowel sounds. Last BM 09/25/21. Pertinent Medications: decadron ( dose decreased from 20 mg every day to 10 mg every day on 9/25), prn dulcolax suppository ( dose given 9/24 PM), precedex, pepcid, ssi (none yesterday or thus far today, has only received 3 units in the past 7 days)   Pertinent Labs:   Lab Results   Component Value Date/Time    Sodium 141 09/27/2021 02:09 AM    Potassium 3.8 09/27/2021 02:09 AM    Chloride 109 (H) 09/27/2021 02:09 AM    CO2 24 09/27/2021 02:09 AM    Anion gap 8 09/27/2021 02:09 AM    Glucose 122 (H) 09/27/2021 02:09 AM    BUN 27 (H) 09/27/2021 02:09 AM    Creatinine 0.67 (L) 09/27/2021 02:09 AM    Calcium 8.4 09/27/2021 02:09 AM    Albumin 2.5 (L) 09/20/2021 01:30 PM    Magnesium 2.2 09/27/2021 02:09 AM    Phosphorus 2.8 09/27/2021 02:09 AM     Lab Results   Component Value Date/Time    Glucose 122 (H) 09/27/2021 02:09 AM    Glucose (POC) 112 (H) 09/27/2021 05:50 AM    Glucose (POC) 122 (H) 09/26/2021 11:09 PM    Glucose (POC) 127 (H) 09/26/2021 06:14 PM    Glucose (POC) 93 09/26/2021 01:05 PM    Glucose (POC) 109 (H) 09/26/2021 06:10 AM    Glucose (POC) 135 (H) 09/25/2021 11:54 PM     Remarks:  Increased glucoses likely d/t steroids, no h/o DM. No need for continued POC glucoses or SSI since glucoses < 150 mg/dl with TPN at goal.  K and Phos with slight trend down. Current TPN provides 31 meq of K and Phos per day. Would benefit from increased K and Phos in TPN. Nutrition Related Findings:   9/24: PPN started, PICC lined placed after 1330. 9/25: PPN changed to CPN (1 L 14%DEX/8%AA).  9/26: TPN rate increased to goal 65/hr/1.56L/d      Current Nutrition Therapies:  ADULT DIET Regular  Current Parenteral Nutrition Orders:  · Type and Formula: 14%DEX/8%AA   · Lipids: 250ml, Daily  · Duration: Continuous  · Rate/Volume: 65 ml/hr (1.56 L/d)  · Current PN Order Provides: at goal  · Goal PN Orders Provides: 1742 kcal/d (100% of needs), 125 grams of protein/d (100% of needs), 218 grams of CHO/d and 1810 ml of total volume/d      Current Intake:   Average Meal Intake: NPO        Anthropometric Measures:  Height: 6' (182.9 cm)  Current Body Wt: 85.9 kg (189 lb 6 oz) (9/26), Weight source: Bed scale   Last 3 Recorded Weights in this Encounter    09/24/21 1356 09/25/21 1521 09/26/21 1816   Weight: 86.7 kg (191 lb 2.2 oz) 83.6 kg (184 lb 4.9 oz) 85.9 kg (189 lb 6 oz)     BMI: 25.7, Overweight (BMI 25.0-29. 9)  Admission Body Weight: 190 lb 0.6 oz (stated)  Ideal Body Weight (lbs) (Calculated): 178 lbs (81 kg),    Usual Body Wt: 93.9 kg (207 lb) (9/10/2021 as PCP office, 190# at PCP OV 2018),         Edema: No data recorded   Estimated Daily Nutrient Needs:  Energy (kcal/day): 4435-1465 (Kcal/kg (20-25), Weight Used:  (86.7 kg bed scale 9/24))  Protein (g/day): 103-172 (1.2-2 grams/kg) Weight Used: (Admission (stated 86.2 kg))  Fluid (ml/day): ( (minimize or per MD))    Nutrition Diagnosis:   · Inadequate oral intake related to impaired respiratory function as evidenced by nutrition support-parenteral nutrition    Nutrition Interventions:   Food and/or Nutrient Delivery: Continue current diet, Modify parenteral nutrition, Start oral nutrition supplement     Coordination of Nutrition Care: Continue to monitor while inpatient      Goals:   Previous Goal Met: Goal(s) achieved  Active Goal: PN to meet estimated needs unitl pt can transition to EN intake >60% of estimated needs    Nutrition Monitoring and Evaluation:      Food/Nutrient Intake Outcomes: Food and nutrient intake, Supplement intake, Parenteral nutrition intake/tolerance  Physical Signs/Symptoms Outcomes: Biochemical data, Weight (respiratory status)    Discharge Planning:     Too soon to determine    Dread Oliva RD, LD, [facility]    Disaster Mode Active

## 2021-09-28 LAB
ANION GAP SERPL CALC-SCNC: 7 MMOL/L (ref 7–16)
BASOPHILS # BLD: 0 K/UL (ref 0–0.2)
BASOPHILS NFR BLD: 1 % (ref 0–2)
BUN SERPL-MCNC: 22 MG/DL (ref 6–23)
CALCIUM SERPL-MCNC: 8.3 MG/DL (ref 8.3–10.4)
CHLORIDE SERPL-SCNC: 108 MMOL/L (ref 98–107)
CO2 SERPL-SCNC: 24 MMOL/L (ref 21–32)
CREAT SERPL-MCNC: 0.6 MG/DL (ref 0.8–1.5)
DIFFERENTIAL METHOD BLD: ABNORMAL
EOSINOPHIL # BLD: 0.1 K/UL (ref 0–0.8)
EOSINOPHIL NFR BLD: 2 % (ref 0.5–7.8)
ERYTHROCYTE [DISTWIDTH] IN BLOOD BY AUTOMATED COUNT: 12.5 % (ref 11.9–14.6)
GLUCOSE SERPL-MCNC: 110 MG/DL (ref 65–100)
HCT VFR BLD AUTO: 40.1 % (ref 41.1–50.3)
HGB BLD-MCNC: 13.5 G/DL (ref 13.6–17.2)
IMM GRANULOCYTES # BLD AUTO: 0.2 K/UL (ref 0–0.5)
IMM GRANULOCYTES NFR BLD AUTO: 6 % (ref 0–5)
LYMPHOCYTES # BLD: 0.9 K/UL (ref 0.5–4.6)
LYMPHOCYTES NFR BLD: 34 % (ref 13–44)
MCH RBC QN AUTO: 29.3 PG (ref 26.1–32.9)
MCHC RBC AUTO-ENTMCNC: 33.7 G/DL (ref 31.4–35)
MCV RBC AUTO: 87 FL (ref 79.6–97.8)
MONOCYTES # BLD: 0.3 K/UL (ref 0.1–1.3)
MONOCYTES NFR BLD: 11 % (ref 4–12)
NEUTS SEG # BLD: 1.2 K/UL (ref 1.7–8.2)
NEUTS SEG NFR BLD: 46 % (ref 43–78)
NRBC # BLD: 0 K/UL (ref 0–0.2)
PLATELET # BLD AUTO: 293 K/UL (ref 150–450)
PLATELET COMMENTS,PCOM: ADEQUATE
PMV BLD AUTO: 10.1 FL (ref 9.4–12.3)
POTASSIUM SERPL-SCNC: 4 MMOL/L (ref 3.5–5.1)
RBC # BLD AUTO: 4.61 M/UL (ref 4.23–5.6)
RBC MORPH BLD: ABNORMAL
SODIUM SERPL-SCNC: 139 MMOL/L (ref 136–145)
WBC # BLD AUTO: 2.7 K/UL (ref 4.3–11.1)
WBC MORPH BLD: ABNORMAL

## 2021-09-28 PROCEDURE — 74011250637 HC RX REV CODE- 250/637: Performed by: EMERGENCY MEDICINE

## 2021-09-28 PROCEDURE — 94762 N-INVAS EAR/PLS OXIMTRY CONT: CPT

## 2021-09-28 PROCEDURE — 99232 SBSQ HOSP IP/OBS MODERATE 35: CPT | Performed by: INTERNAL MEDICINE

## 2021-09-28 PROCEDURE — 97112 NEUROMUSCULAR REEDUCATION: CPT

## 2021-09-28 PROCEDURE — 74011250636 HC RX REV CODE- 250/636: Performed by: INTERNAL MEDICINE

## 2021-09-28 PROCEDURE — 97165 OT EVAL LOW COMPLEX 30 MIN: CPT

## 2021-09-28 PROCEDURE — 80048 BASIC METABOLIC PNL TOTAL CA: CPT

## 2021-09-28 PROCEDURE — 2709999900 HC NON-CHARGEABLE SUPPLY

## 2021-09-28 PROCEDURE — 97161 PT EVAL LOW COMPLEX 20 MIN: CPT

## 2021-09-28 PROCEDURE — 74011000250 HC RX REV CODE- 250: Performed by: INTERNAL MEDICINE

## 2021-09-28 PROCEDURE — 74011250637 HC RX REV CODE- 250/637: Performed by: INTERNAL MEDICINE

## 2021-09-28 PROCEDURE — 97530 THERAPEUTIC ACTIVITIES: CPT

## 2021-09-28 PROCEDURE — 65270000029 HC RM PRIVATE

## 2021-09-28 PROCEDURE — 97535 SELF CARE MNGMENT TRAINING: CPT

## 2021-09-28 PROCEDURE — 77010033711 HC HIGH FLOW OXYGEN

## 2021-09-28 PROCEDURE — 85025 COMPLETE CBC W/AUTO DIFF WBC: CPT

## 2021-09-28 RX ORDER — FAMOTIDINE 20 MG/1
20 TABLET, FILM COATED ORAL 2 TIMES DAILY
Status: DISCONTINUED | OUTPATIENT
Start: 2021-09-28 | End: 2021-10-04 | Stop reason: HOSPADM

## 2021-09-28 RX ORDER — DEXAMETHASONE SODIUM PHOSPHATE 100 MG/10ML
5 INJECTION INTRAMUSCULAR; INTRAVENOUS EVERY 24 HOURS
Status: DISCONTINUED | OUTPATIENT
Start: 2021-09-28 | End: 2021-10-01

## 2021-09-28 RX ORDER — ZOLPIDEM TARTRATE 5 MG/1
5 TABLET ORAL
Status: DISCONTINUED | OUTPATIENT
Start: 2021-09-28 | End: 2021-10-04 | Stop reason: HOSPADM

## 2021-09-28 RX ORDER — CHOLECALCIFEROL (VITAMIN D3) 125 MCG
5 CAPSULE ORAL
Status: DISCONTINUED | OUTPATIENT
Start: 2021-09-28 | End: 2021-10-04 | Stop reason: HOSPADM

## 2021-09-28 RX ORDER — FAMOTIDINE 20 MG/1
20 TABLET, FILM COATED ORAL 2 TIMES DAILY
Status: DISCONTINUED | OUTPATIENT
Start: 2021-09-28 | End: 2021-09-28

## 2021-09-28 RX ADMIN — Medication 10 ML: at 05:21

## 2021-09-28 RX ADMIN — ZOLPIDEM TARTRATE 5 MG: 5 TABLET ORAL at 23:16

## 2021-09-28 RX ADMIN — Medication 10 ML: at 21:04

## 2021-09-28 RX ADMIN — Medication 10 ML: at 13:01

## 2021-09-28 RX ADMIN — ENOXAPARIN SODIUM 40 MG: 40 INJECTION SUBCUTANEOUS at 16:19

## 2021-09-28 RX ADMIN — FAMOTIDINE 20 MG: 10 INJECTION INTRAVENOUS at 08:34

## 2021-09-28 RX ADMIN — FAMOTIDINE 20 MG: 20 TABLET, FILM COATED ORAL at 16:19

## 2021-09-28 RX ADMIN — LORAZEPAM 1 MG: 2 INJECTION INTRAMUSCULAR; INTRAVENOUS at 05:17

## 2021-09-28 RX ADMIN — HYDROCODONE BITARTRATE AND ACETAMINOPHEN 1 TABLET: 7.5; 325 TABLET ORAL at 05:17

## 2021-09-28 RX ADMIN — HYDROCODONE BITARTRATE AND ACETAMINOPHEN 1 TABLET: 7.5; 325 TABLET ORAL at 12:17

## 2021-09-28 RX ADMIN — HYDROCODONE BITARTRATE AND ACETAMINOPHEN 1 TABLET: 7.5; 325 TABLET ORAL at 23:16

## 2021-09-28 RX ADMIN — ENOXAPARIN SODIUM 40 MG: 40 INJECTION SUBCUTANEOUS at 05:17

## 2021-09-28 RX ADMIN — DEXAMETHASONE SODIUM PHOSPHATE 5 MG: 10 INJECTION INTRAMUSCULAR; INTRAVENOUS at 13:00

## 2021-09-28 RX ADMIN — HYDROCODONE BITARTRATE AND ACETAMINOPHEN 1 TABLET: 7.5; 325 TABLET ORAL at 16:19

## 2021-09-28 RX ADMIN — Medication 5 MG: at 21:04

## 2021-09-28 NOTE — PROGRESS NOTES
Received report from [facility]. Patient awake and in recliner. A&O x4. Respirations present. On airvo 60L 60%. No signs of distress. No needs expressed. Bed low and locked. Call light within reach. Will continue to monitor.

## 2021-09-28 NOTE — PROGRESS NOTES
MSN, CM:  Received patient this AM from ICU. Patient currently on 60/60 airvo. Patient has received Decadron and Actemra. PT/OT recommend [facility] when medically stable for discharge. Case Management will continue to follow.

## 2021-09-28 NOTE — PROGRESS NOTES
TRANSFER - OUT REPORT:    Verbal report given to Zoie Brown RN(name) on [facility]  being transferred to 8th floor(unit) for routine progression of care       Report consisted of patients Situation, Background, Assessment and   Recommendations(SBAR). Information from the following report(s) SBAR, Kardex, Intake/Output, MAR, Recent Results and Cardiac Rhythm NSR was reviewed with the receiving nurse. Lines:   PICC Double Lumen 09/24/21 Right;Brachial (Active)   Central Line Being Utilized No 09/28/21 0715   Criteria for Appropriate Use Total parenteral nutrition 09/28/21 0715   Site Assessment Clean, dry, & intact 09/28/21 0715   Phlebitis Assessment 0 09/28/21 0715   Infiltration Assessment 0 09/28/21 0715   Arm Circumference (cm) 30 cm 09/24/21 1550   Date of Last Dressing Change 09/24/21 09/28/21 0715   Dressing Status Clean, dry, & intact 09/28/21 0715   External Catheter Length (cm) 0 centimeters 09/24/21 1550   Dressing Type Disk with Chlorhexadine gluconate (CHG); Transparent 09/28/21 0715   Hub Color/Line Status Purple;Patent; Flushed;Capped 09/28/21 0715   Positive Blood Return (Site #1) Yes 09/28/21 0715   Hub Color/Line Status Red;Patent; Flushed;Capped 09/28/21 0715   Positive Blood Return (Site #2) Yes 09/28/21 0715   Alcohol Cap Used No 09/27/21 1900        Opportunity for questions and clarification was provided.       Patient transported with:   O2 @ 60L 60% liters  Registered Nurse  DOCUSYS

## 2021-09-28 NOTE — PROGRESS NOTES
Bedside and verbal shift change report received from  Faviola Carrillo (offgoing nurse). Report included the following information SBAR, Kardex, Intake/Output, MAR, Recent Results and Cardiac Rhythm SR/SB.      Dual skin assessment completed at bedside: none (list pertinent skin assessment findings)    Dual verification of gtts completed (name of gtts verified): none

## 2021-09-28 NOTE — PROGRESS NOTES
Pt arrived to room 811 via bed from CCU. Pt alert oriented times 3 at this time. Pt on airvo at 60L 60% with sat 95% at time. RT called for continuous sat monitor. Pt complains of sore throat but denies other pain or distress at this time. Pt has no skin breakdown noted at this time. Pt skin assessed with EMMANUELLE Kohler. Pt oriented to room and surroundings. Pt encouraged to call for assistance if needed call light in reach, will monitor.

## 2021-09-28 NOTE — PROGRESS NOTES
ACUTE OT GOALS:  (Developed with and agreed upon by patient and/or caregiver.)  1) Patient will complete lower body bathing and dressing with MOD I and adaptive equipment as needed. 2) Patient will complete toileting with MOD I.   3) Patient will complete functional transfers with MOD I and adaptive equipment as needed. 4) Patient will tolerate at least 30 minutes of OT activity with 1-2 rest breaks while maintaining O2 sats >90%. 5) Patient will verbalize at least 3 energy conservation technique to utilize during ADL/IADL. Timeframe: 7 visits       OCCUPATIONAL THERAPY ASSESSMENT: Initial Assessment and Daily Note OT Treatment Day # 1    Arin Venegas is a 42 y.o. male   PRIMARY DIAGNOSIS: Acute hypoxemic respiratory failure due to COVID-19 [facility])  Acute hypoxemic respiratory failure [facility] [U07.1, J96.01]       Reason for Referral:   ICD-10: Treatment Diagnosis: Generalized Muscle Weakness (M62.81)  INPATIENT: Payor: BLUE CROSS / Plan: [place] [facility] / Product Type: PPO /   ASSESSMENT:     REHAB RECOMMENDATIONS:   Recommendation to date pending progress:  [address] [place] [address] Therapy  Equipment:    To Be Determined     PRIOR LEVEL OF FUNCTION:  (Prior to Hospitalization)  INITIAL/CURRENT LEVEL OF FUNCTION:  (Based on today's evaluation)   Bathing:   Independent  Dressing:   Independent  Feeding/Grooming:   Independent  Toileting:   Independent  Functional Mobility:   Independent Bathing:   Standby Assistance  Dressing:   Standby Assistance  Feeding/Grooming:   Independent  Toileting:   Standby Assistance  Functional Mobility:   Contact Guard Assistance     ASSESSMENT:  Mr. Radha Blakely presents with deficits in overall strength, activity tolerance, ADL performance and functional mobility. Admitted for acute respiratory failure d/t Covid-19; resting on Airvo at 60L 60% with Sp02 > 90%.   CGA for functional bed mobility/transfers; good EOB sitting balance. Proceeded to complete self-grooming tasks including washing face and oral hygiene with set-up. Rest break provided as needed. Proceeded to complete functional mobility in room with CGA. Pt educated on energy conservation strategies for home use and pursed lip breathing strategies. OOB to chair today. Pt's Sp02 at 92% at end of session. At this time, Jorge Oliva is functioning below baseline for ADLs and functional mobility. Pt would benefit from skilled OT services to address OT goals and and plan of care. .     SUBJECTIVE:   Mr. Marta Cano states, \"All this phelm is my problem. \"    SOCIAL HISTORY/LIVING ENVIRONMENT: Lives with spouse and family in a 2-story home with living accommodations on 1st level. 8 steps to enter with L sided hand rail. Independent at baseline for ADLs and functional mobility. 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    Support Systems: Spouse/Significant Other    OBJECTIVE:     PAIN: VITAL SIGNS: LINES/DRAINS:   Pre Treatment: Pain Screen  Pain Scale 1: Numeric (0 - 10)  Pain Intensity 1: 0  Post Treatment: 0   none  O2 Device: Heated, Hi flow nasal cannula     GROSS EVALUATION:  BUEs Within Functional Limits Abnormal/ Functional Abnormal/ Non-Functional (see comments) Not Tested Comments:   AROM [] [x] [] []    PROM [] [] [] []    Strength [] [x] [] [] 4/5   Balance [] [x] [] [] Fair (+) dynamic standing balance   Posture [] [x] [] []    Sensation [x] [] [] []    Coordination [x] [] [] []    Tone [x] [] [] []    Edema [x] [] [] []    Activity Tolerance [] [x] [] [] Airvo 60L/60%    [] [] [] []      COGNITION/  PERCEPTION: Intact Impaired   (see comments) Comments:   Orientation [x] []    Vision [] [x] Wears glasses   Hearing [x] []    Judgment/ Insight [x] []    Attention [x] []    Memory [x] []    Command Following [x] []    Emotional Regulation [x] []     [] []      ACTIVITIES OF DAILY LIVING: I Mod I S SBA CGA Min Mod Max Total NT Comments   BASIC ADLs: Bathing/ Showering [] [] [] [] [] [] [] [] [] [x]    Toileting [] [] [] [] [] [] [] [] [] [x]    Dressing [] [] [] [] [] [] [] [] [] [x]    Feeding [] [] [] [] [] [] [] [] [] [x]    Grooming [] [] [x] [x] [] [] [] [] [] [] Oral hygiene and washing face in standing   Personal Device Care [] [] [] [] [] [] [] [] [] [x]    Functional Mobility [] [] [] [] [x] [] [] [] [] []    I=Independent, Mod I=Modified Independent, S=Supervision, SBA=Standby Assistance, CGA=Contact Guard Assistance,   Min=Minimal Assistance, Mod=Moderate Assistance, Max=Maximal Assistance, Total=Total Assistance, NT=Not Tested    MOBILITY: I Mod I S SBA CGA Min Mod Max Total  NT x2 Comments:   Supine to sit [] [] [] [] [x] [x] [] [] [] [] []    Sit to supine [] [] [] [] [] [] [] [] [] [x] []    Sit to stand [] [] [] [] [x] [] [] [] [] [] []    Bed to chair [] [] [] [] [x] [] [] [] [] [] []    I=Independent, Mod I=Modified Independent, S=Supervision, SBA=Standby Assistance, CGA=Contact Guard Assistance,   Min=Minimal Assistance, Mod=Moderate Assistance, Max=Maximal Assistance, Total=Total Assistance, NT=Not Tested    [facility] [address] [address] AM-PAC 6 Clicks   Daily Activity Inpatient Short Form        How much help from another person does the patient currently need. .. Total A Lot A Little None   1. Putting on and taking off regular lower body clothing? [] 1   [] 2   [x] 3   [] 4   2. Bathing (including washing, rinsing, drying)? [] 1   [] 2   [x] 3   [] 4   3. Toileting, which includes using toilet, bedpan or urinal?   [] 1   [] 2   [x] 3   [] 4   4. Putting on and taking off regular upper body clothing? [] 1   [] 2   [x] 3   [] 4   5. Taking care of personal grooming such as brushing teeth? [] 1   [] 2   [] 3   [x] 4   6. Eating meals? [] 1   [] 2   [] 3   [x] 4   © 2007, Trustees of [address] [place] [address] [address] [id number], under license to [facility].  All rights reserved     Score:  Initial: 20 Most Recent: X (Date: -- )   Interpretation of Tool: Represents activities that are increasingly more difficult (i.e. Bed mobility, Transfers, Gait). PLAN:   FREQUENCY/DURATION: OT Plan of Care: 3 times/week for duration of hospital stay or until stated goals are met, whichever comes first.    PROBLEM LIST:   (Skilled intervention is medically necessary to address:)  1. Decreased ADL/Functional Activities  2. Decreased Activity Tolerance  3. Decreased Balance  4. Decreased Coordination  5. Decreased Gait Ability  6. Decreased Strength  7. Decreased Transfer Abilities   INTERVENTIONS PLANNED:   (Benefits and precautions of occupational therapy have been discussed with the patient.)  1. Self Care Training  2. Therapeutic Activity  3. Therapeutic Exercise/HEP  4. Neuromuscular Re-education  5. Education     TREATMENT:     EVALUATION: Low Complexity : (Untimed Charge)    TREATMENT:   ($$ Self Care/Home Management: 8-22 mins$$ Neuromuscular Re-Education: 8-22 mins   )  Self Care (13 Minutes): Self care including Grooming and Energy Conservation Training to increase independence and decrease level of assistance required. Neuromuscular Re-education (10 Minutes): Neuromuscular Re-education included Balance Training, Coordination training, Postural training, Sitting balance training and Standing balance training to improve Balance, Coordination and Postural Control.     TREATMENT GRID:  N/A    AFTER TREATMENT POSITION/PRECAUTIONS:  Chair, Needs within reach and RN notified    INTERDISCIPLINARY COLLABORATION:  RN/PCT, PT/PTA and OT/SPICER    TOTAL TREATMENT DURATION:  OT Patient Time In/Time Out  Time In: 2830  Time Out: [address], OT

## 2021-09-28 NOTE — PROGRESS NOTES
TRANSFER - IN REPORT:    Verbal report received from Gagan Smart on Sarah Alvarenga  being received from CCU for routine progression of care      Report consisted of patients Situation, Background, Assessment and   Recommendations(SBAR). Information from the following report(s) SBAR and Recent Results was reviewed with the receiving nurse. Opportunity for questions and clarification was provided. Assessment completed upon patients arrival to unit and care assumed.

## 2021-09-28 NOTE — PROGRESS NOTES
[facility] Hospitalist Consult   Admit Date:  2021  1:19 PM   Name:  Bossman Jurado   Age:  42 y.o. Sex:  male  :  1980   MRN:  [id number]   Room:  /    Presenting Complaint: Respiratory Distress    Reason(s) for Admission: Acute hypoxemic respiratory failure due to COVID-19 [facility] [U07.1, J96.01]     Hospitalists consulted by Tanika Escalera MD for: assumption of care     History of Presenting Illness:       Bossman Jurado is a 42 y.o. male with history of COVID 23 diagnosed 9-10-21 admitted with acute hypoxic respiratory failure due to COVID 19 pneumonia. He is not vaccinated. He was admitted to ICU  For BIPAP and followed by pulmonary. He is weaned to [facility]. He is managed with IV decadron and s/p ACTEMRA. Discharge plans pending. Review of Systems:  ROS   in HPI. Assessment & Plan:     Principal Problem:    Acute hypoxemic respiratory failure due to COVID-19 [facility]) (2021)    Active Problems:    COVID-19 (2021)  ·   Currently on AIRVO 60L/ FIO2 60%, to wean as tolerant   ·   Continued IV decadron         Leukopenia (2021)  ·   Trend CBC        Hospital Problems as of 2021 Date Reviewed: 2021        Codes Class Noted - Resolved POA    Leukopenia ICD-10-CM: D72.819  ICD-9-CM: 288.50  2021 - Present Unknown        COVID-19 ICD-10-CM: U07.1  ICD-9-CM: 079.89  2021 - Present Unknown        Acute respiratory failure with hypoxia (HCC) ICD-10-CM: J96.01  ICD-9-CM: 518.81  2021 - Present Unknown        Pneumonia due to COVID-19 virus ICD-10-CM: U07.1, J12.82  ICD-9-CM: 480.8, 079.89  2021 - Present Unknown        Shortness of breath ICD-10-CM: R06.02  ICD-9-CM: 786.05  2021 - Present Unknown        * (Principal) Acute hypoxemic respiratory failure due to COVID-19 [facility]) ICD-10-CM: U07.1, J96.01  ICD-9-CM: 518.81, 079.89, 799.02  2021 - Present Unknown              History reviewed.  No pertinent past medical history. History reviewed. No pertinent surgical history. No Known Allergies   Social History     Tobacco Use    Smoking status: Not on file   Substance Use Topics    Alcohol use: Not on file      History reviewed. No pertinent family history. Family history reviewed and negative unless otherwise noted above. There is no immunization history on file for this patient.     Objective:     Patient Vitals for the past 24 hrs:   Temp Pulse Resp BP SpO2   09/28/21 1019 98.5 °F (36.9 °C) 95 28 104/75 95 %   09/28/21 0915  95 26  94 %   09/28/21 0900  99 23  93 %   09/28/21 0845  (!) 109 26  91 %   09/28/21 0830  94 18  95 %   09/28/21 0815  95 24  95 %   09/28/21 0800  90 (!) 32  96 %   09/28/21 0746     96 %   09/28/21 0745  89 25     09/28/21 0730  (!) 59 10  96 %   09/28/21 0715 99.1 °F (37.3 °C) (!) 57 10 132/82 95 %   09/28/21 0700  (!) 54 12  97 %   09/28/21 0630  61 11  95 %   09/28/21 0600  65 15  96 %   09/28/21 0530  61 17  95 %   09/28/21 0500  62 16  95 %   09/28/21 0430  70 20  93 %   09/28/21 0400  73 14  96 %   09/28/21 0330  63 21  96 %   09/28/21 0300  69 27  98 %   09/28/21 0250 99.4 °F (37.4 °C) (!) 59 25 118/67 97 %   09/28/21 0230  79 27  98 %   09/28/21 0200  60 14  97 %   09/28/21 0130  60 19  95 %   09/28/21 0100  78 (!) 40  93 %   09/28/21 0030  65 13  96 %   09/28/21 0000  66 16  95 %   09/27/21 2330  69 23  95 %   09/27/21 2300 99.3 °F (37.4 °C) 65 16 108/79 97 %   09/27/21 2230  75 24  96 %   09/27/21 2200  92 25  95 %   09/27/21 2130  90 22  97 %   09/27/21 2100  82 24  93 %   09/27/21 2036  94 (!) 42  92 %   09/27/21 2000  73 (!) 40  97 %   09/27/21 1930  86 (!) 47  96 %   09/27/21 1900 99.1 °F (37.3 °C) 93 22 (!) 89/53 95 %   09/27/21 1830  (!) 105 (!) 31  94 %   09/27/21 1815  99 29  95 %   09/27/21 1800  (!) 104 27  92 %   09/27/21 1745  99 26  96 %   09/27/21 1730  (!) 101 (!) 35  96 %   09/27/21 1715  (!) 103 (!) 32  91 %   09/27/21 1706     94 %   09/27/21 1700  89 (!) 37  93 %   09/27/21 1645  77 (!) 39  97 %   09/27/21 1630  76 (!) 37  94 %   09/27/21 1615  81 26  93 %   09/27/21 1600 98.9 °F (37.2 °C) 88 29 (!) 102/56 91 %   09/27/21 1545  74 (!) 36  94 %   09/27/21 1530  70 15  95 %   09/27/21 1521  70 17  94 %   09/27/21 1520  72 24  93 %   09/27/21 1519  75 29  93 %     Oxygen Therapy  O2 Sat (%): 95 % (09/28/21 1019)  Pulse via Oximetry: 94 beats per minute (09/28/21 0915)  O2 Device: Heated; Hi flow nasal cannula (09/28/21 1026)  Skin Assessment: Clean, dry, & intact (09/28/21 0715)  Skin Protection for O2 Device: Yes (09/27/21 0745)  Orientation: Middle (09/27/21 0745)  Location: Other (Comment) (nasal bridge) (09/27/21 0745)  Interventions: Mouth Care;Reposition Device (09/27/21 1600)  O2 Flow Rate (L/min): 60 l/min (09/28/21 1026)  O2 Temperature: 87.8 °F (31 °C) (09/27/21 2200)  FIO2 (%): 60 % (09/28/21 1026)    Estimated body mass index is 25.68 kg/m² as calculated from the following:    Height as of this encounter: 6' (1.829 m). Weight as of this encounter: 85.9 kg (189 lb 6 oz). Intake/Output Summary (Last 24 hours) at 9/28/2021 1518  Last data filed at 9/28/2021 0830  Gross per 24 hour   Intake 880 ml   Output 975 ml   Net -95 ml         Physical Exam:  General:    Well nourished. No overt distress  Head:  Normocephalic, atraumatic  Eyes:  Sclerae appear normal.  Pupils equally round. ENT:  Nares appear normal, no drainage. Moist oral mucosa  Neck:  No restricted ROM. Trachea midline   CV:   RRR. No m/r/g. No jugular venous distension. Lungs:   CTAB. No wheezing, rhonchi, or rales. Respirations even, unlabored  Abdomen: Bowel sounds present. Soft, nontender, nondistended. Extremities: No cyanosis or clubbing. No edema  Skin:     No rashes and normal coloration. Warm and dry. Neuro:   grossly intact. Psych:  Normal mood and affect.   Alert     I have reviewed ordered lab tests and independently visualized imaging below:    Last 24hr Labs:  Recent Results (from the past 24 hour(s))   METABOLIC PANEL, BASIC    Collection Time: 09/28/21  2:33 AM   Result Value Ref Range    Sodium 139 136 - 145 mmol/L    Potassium 4.0 3.5 - 5.1 mmol/L    Chloride 108 (H) 98 - 107 mmol/L    CO2 24 21 - 32 mmol/L    Anion gap 7 7 - 16 mmol/L    Glucose 110 (H) 65 - 100 mg/dL    BUN 22 6 - 23 MG/DL    Creatinine 0.60 (L) 0.8 - 1.5 MG/DL    GFR est AA >60 >60 ml/min/1.73m2    GFR est non-AA >60 >60 ml/min/1.73m2    Calcium 8.3 8.3 - 10.4 MG/DL   CBC WITH AUTOMATED DIFF    Collection Time: 09/28/21  2:33 AM   Result Value Ref Range    WBC 2.7 (L) 4.3 - 11.1 K/uL    RBC 4.61 4.23 - 5.6 M/uL    HGB 13.5 (L) 13.6 - 17.2 g/dL    HCT 40.1 (L) 41.1 - 50.3 %    MCV 87.0 79.6 - 97.8 FL    MCH 29.3 26.1 - 32.9 PG    MCHC 33.7 31.4 - 35.0 g/dL    RDW 12.5 11.9 - 14.6 %    PLATELET 040 533 - 589 K/uL    MPV 10.1 9.4 - 12.3 FL    ABSOLUTE NRBC 0.00 0.0 - 0.2 K/uL    NEUTROPHILS 46 43 - 78 %    LYMPHOCYTES 34 13 - 44 %    MONOCYTES 11 4.0 - 12.0 %    EOSINOPHILS 2 0.5 - 7.8 %    BASOPHILS 1 0.0 - 2.0 %    IMMATURE GRANULOCYTES 6 (H) 0.0 - 5.0 %    ABS. NEUTROPHILS 1.2 (L) 1.7 - 8.2 K/UL    ABS. LYMPHOCYTES 0.9 0.5 - 4.6 K/UL    ABS. MONOCYTES 0.3 0.1 - 1.3 K/UL    ABS. EOSINOPHILS 0.1 0.0 - 0.8 K/UL    ABS. BASOPHILS 0.0 0.0 - 0.2 K/UL    ABS. IMM. GRANS. 0.2 0.0 - 0.5 K/UL    RBC COMMENTS NORMOCYTIC/NORMOCHROMIC      WBC COMMENTS OCCASIONAL      PLATELET COMMENTS ADEQUATE      DF AUTOMATED         All Micro Results     None          Other Studies:  No results found.     Current Meds:  Current Facility-Administered Medications   Medication Dose Route Frequency    famotidine (PEPCID) tablet 20 mg  20 mg Oral BID    dexamethasone (DECADRON) 10 mg/mL injection 5 mg  5 mg IntraVENous Q24H    phenol throat spray (CHLORASEPTIC) 1 Spray  1 Spray Oral PRN    HYDROcodone-acetaminophen (NORCO) 7.5-325 mg per tablet 1 Tablet  1 Tablet Oral Q6H    NUTRITIONAL SUPPORT ELECTROLYTE PRN ORDERS   Does Not Apply PRN    central line flush (saline) syringe 10 mL  10 mL InterCATHeter Q8H    LORazepam (ATIVAN) injection 1 mg  1 mg IntraVENous Q6H PRN    sodium chloride (NS) flush 5-40 mL  5-40 mL IntraVENous Q8H    sodium chloride (NS) flush 5-40 mL  5-40 mL IntraVENous Q8H    sodium chloride (NS) flush 5-40 mL  5-40 mL IntraVENous PRN    acetaminophen (TYLENOL) tablet 650 mg  650 mg Oral Q4H PRN    bisacodyL (DULCOLAX) suppository 10 mg  10 mg Rectal DAILY PRN    enoxaparin (LOVENOX) injection 40 mg  40 mg SubCUTAneous Q12H    morphine injection 2 mg  2 mg IntraVENous Q4H PRN       Signed:  Parul Khan MD    Part of this note may have been written by using a voice dictation software. The note has been proof read but may still contain some grammatical/other typographical errors.

## 2021-09-28 NOTE — PROGRESS NOTES
[facility] Critical Care Note[de-identified] 9/28/2021  [address], [address]  Admission Date: 9/20/2021     Length of Stay: 8 days    Background: Patient is a 42 y.o.  male seen and evaluated at the request of Dr. Brandon Crabtree for dyspnea and COVID 19. The patient presented to the ED with c/o worsening shortness of breath for almost two weeks. The patient tested positive for COVID at [facility] 9/10/2021. The patient c/o sinus congestion and cough. EMS was unable to  O2 sat at home and patient was placed on 15L O2 with improvement of O2 sats in the 80s, no fever noted on arrival to ED  CXR reveals bilateral atypical pneumonia, CRP 9.1, ABG reveals pH 7.43, CO2 37.1, O2 87, HCO3 24.6.      The pt is unvaccinated. He has had fever and difficulty taking deep breaths. He reports some pain with breathing but no hemoptysis. He has not had any purulent sputum.        Notable PMH:  has no past medical history on file. 24 Hour events: Tolerated airvo all night, ready tor transfer out of ICU      ROS: negative except as listed elsewhere. Lines: (insertion date)   ETT: (N/A)  Leggett: (N/A)  OGT: (N/A)  Central line: (N/A)  Arterial Line (N/A)    Drips: current dose (range)    None  Precedex Dose (mcg/kg/hr): 0 mcg/kg/hr     Pertinent Exam:         Blood pressure 132/82, pulse 95, temperature 99.1 °F (37.3 °C), resp. rate 26, height 6' (1.829 m), weight 189 lb 6 oz (85.9 kg), SpO2 94 %. Intake/Output Summary (Last 24 hours) at 9/28/2021 1020  Last data filed at 9/28/2021 0830  Gross per 24 hour   Intake 1438.42 ml   Output 1225 ml   Net 213.42 ml     Constitutional:  Comfortable on BIPAP.  Sat 96% on 70%  EENMT:  Sclera clear, pupils equal, oral mucosa moist  Respiratory: Decreased BS with scattered rhonchi  Cardiovascular:  RRR  Gastrointestinal:  soft with no tenderness; positive bowel sounds present  Musculoskeletal:  warm with no cyanosis, no lower extremity edema  Skin:  no jaundice or ecchymosis  Neurologic: awakens and non focal  Psychiatric: calm    CXR:   None today    9/25, 23:             9/23, 22:          9/21, 20:          Recent Labs     09/28/21  0233 09/27/21  0209 09/26/21  0300   WBC 2.7* 2.8* 2.1*   HGB 13.5* 14.4 14.3   HCT 40.1* 42.0 42.0    325 353     Recent Labs     09/28/21  0233 09/27/21  0209 09/26/21  0300    141 140   K 4.0 3.8 3.9   * 109* 109*   CO2 24 24 24   * 122* 125*   BUN 22 27* 28*   CREA 0.60* 0.67* 0.71*   MG  --  2.2 2.3   CA 8.3 8.4 8.6   PHOS  --  2.8 3.3     No results for input(s): LAC, TROPHS, BNPNT, CRP in the last 72 hours. No lab exists for component: ESR  Recent Labs     09/27/21  0550 09/26/21  2309 09/26/21  1814   GLUCPOC 112* 122* 127*     ECHO: No results found for this or any previous visit. Results     ** No results found for the last 336 hours. **        Inpat Anti-Infectives (From admission, onward)    None        Ventilator Settings:  Ideal body weight: 77.6 kg (171 lb 1.2 oz)   Mode FIO2 Rate Tidal Volume Pressure PEEP      60 %               Peak airway pressure:         Minute ventilation: 11.2 l/min  ABG:  Recent Labs     09/27/21  0258 09/26/21  0258   PHI 7.40 7.45   PCO2I 37.1 33.7*   PO2I 63* 67*   HCO3I 23.1 23.2     Assessment and Plan:  (Medical Decision Making)   Impression: 42 y.o. male with acute hypoxemic RF due to COVID 19 pneumonia. NEURO:   Sedation: None  Analgesia: None  Paralytics: None  CV:   Volume Status: Appears euvolemic  PULM:   Acute hypoxemic/hypercapneic respiratory failure: Weaned to [facility], feels better hope that this trend continues    Severe ARDS 2/2 COVID: On high dose decadron and got Actemra. High dose decadron for 5 doses completed  and now on 5 mg for 5 days. RENAL:  ЕКАТЕРИНА: Cr 0.71  Lactic acidosis: AG 9. Electrolytes: BS OK  GI:   Nutrition: TPN  HEME:   Leukopenia: WBC up to 2.1.   ANC adequate and greater than 1000  Anemia: N/A  Thrombocytopenia: N/A  Anticoagulation: lovenox 40 q12  ID:   COVID-19: decadron down to 10 mg daily and got Actemra. Advised patient to get vaccinated 90 days after this diagnosis if completely recovered    ENDO:   DM: SSI  Skin: no decub, turns, preventive care  Prophy: H2B, lovenox      Transfer to floor- hospitalist to assume care he is improving hopefully this trend will continue. Wife Tammie Lara [phone].      Full Mateo Scale, MD

## 2021-09-28 NOTE — PROGRESS NOTES
Pt sitting up in chair. airvo in place at 60L 60%. .  No distress noted at this time. Call light in reach, will monitor.

## 2021-09-28 NOTE — PROGRESS NOTES
ACUTE PHYSICAL THERAPY GOALS:  (Developed with and agreed upon by patient and/or caregiver. )    LTG:  (1.)Mr. Oapl Austin will move from supine to sit and sit to supine , scoot up and down and roll side to side in bed with MODIFIED INDEPENDENCE within 7 treatment day(s). (2.)Mr. Opal Austin will transfer from bed to chair and chair to bed with INDEPENDENCE using the least restrictive device within 7 treatment day(s). (3.)Mr. Opal Austin will ambulate with INDEPENDENCE for 200 feet with the least restrictive device within 7 treatment day(s). (4.)Mr. Opal Austin will participate in therapeutic activity/exercises x 25 minutes for increased strength within 7 treatment days. ________________________________________________________________________________________________      PHYSICAL THERAPY ASSESSMENT: Initial Assessment and AM PT Treatment Day # 1      Saintclair Hall is a 42 y.o. male   PRIMARY DIAGNOSIS: Acute hypoxemic respiratory failure due to COVID-19 [facility]  [facility] [U07.1, J96.01]       Reason for Referral:    ICD-10: Treatment Diagnosis: Difficulty in walking, Not elsewhere classified (R26.2)  INPATIENT: Payor: BLUE CROSS / Plan: [place] [facility] / Product Type: PPO /     ASSESSMENT:     REHAB RECOMMENDATIONS:   Recommendation to date pending progress:  Setting:   Outpatient Therapy  Equipment:    To Be Determined     PRIOR LEVEL OF FUNCTION:  (Prior to Hospitalization) INITIAL/CURRENT LEVEL OF FUNCTION:  (Most Recently Demonstrated)   Bed Mobility:   Independent  Sit to Stand:   Independent  Transfers:   Independent  Gait/Mobility:   Independent Bed Mobility:   Supervision  Sit to Stand:  [facility] [employer] Assistance  Transfers:   Standby Assistance  Gait/Mobility:   SBA-CGA     ASSESSMENT:  Mr. Opal Austin presents to PT with [facility] AROM and WFL strength in B LEs.   Pt demonstrated good standing balance and decreased activity tolerance today. He performed transfers and ambulation with SBA-CGA today while on Airvo. Mr. Gómez Tracey could benefit from skilled PT as he is currently functioning below his baseline. SUBJECTIVE:   Mr. 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 \"    SOCIAL HISTORY/LIVING ENVIRONMENT: Lives with family and independent PTA; has very manual job  Support Systems: Spouse/Significant Other  OBJECTIVE:     PAIN: VITAL SIGNS: LINES/DRAINS:   Pre Treatment: Pain Screen  Pain Scale 1: Numeric (0 - 10)  Pain Intensity 1: 0  Post Treatment: 0   none  O2 Device: Heated, Hi flow nasal cannula     GROSS EVALUATION:  B LE Within Functional Limits Abnormal/ Functional Abnormal/ Non-Functional (see comments) Not Tested Comments:   AROM [x] [] [] []    PROM [] [] [] []    Strength [x] [] [] []    Balance [x] [] [] []    Posture [] [] [] []    Sensation [] [] [] []    Coordination [] [] [] []    Tone [] [] [] []    Edema [] [] [] []    Activity Tolerance [] [] [x] [] On Airvo, slight desat with activity    [] [] [] []      COGNITION/  PERCEPTION: Intact Impaired   (see comments) Comments:   Orientation [x] []    Vision [x] []    Hearing [] []    Command Following [x] []    Safety Awareness [x] []     [] []      MOBILITY: I Mod I S SBA CGA Min Mod Max Total  NT x2 Comments:   Bed Mobility    Rolling [] [] [x] [] [] [] [] [] [] [] []    Supine to Sit [] [] [x] [] [] [] [] [] [] [] []    Scooting [] [] [] [] [] [] [] [] [] [] []    Sit to Supine [] [] [] [] [] [] [] [] [] [] []    Transfers    Sit to Stand [] [] [] [x] [x] [] [] [] [] [] []    Bed to Chair [] [] [] [x] [] [] [] [] [] [] []    Stand to Sit [] [] [] [x] [x] [] [] [] [] [] []    I=Independent, Mod I=Modified Independent, S=Supervision, SBA=Standby Assistance, CGA=Contact Guard Assistance,   Min=Minimal Assistance, Mod=Moderate Assistance, Max=Maximal Assistance, Total=Total Assistance, NT=Not Tested  GAIT: I Mod I S SBA CGA Min Mod Max Total  NT x2 Comments:   Level of Assistance [] [] [] [x] [x] [] [] [] [] [] []    Distance 4 ft    DME None    Gait Quality Decreased gait speed    Weightbearing Status N/A     I=Independent, Mod I=Modified Independent, S=Supervision, SBA=Standby Assistance, CGA=Contact Guard Assistance,   Min=Minimal Assistance, Mod=Moderate Assistance, Max=Maximal Assistance, Total=Total Assistance, NT=Not Tested    [facility] AM-PAC [facility] Form       How much difficulty does the patient currently have. .. Unable A Lot A Little None   1. Turning over in bed (including adjusting bedclothes, sheets and blankets)? [] 1   [] 2   [] 3   [x] 4   2. Sitting down on and standing up from a chair with arms ( e.g., wheelchair, bedside commode, etc.)   [] 1   [] 2   [] 3   [x] 4   3. Moving from lying on back to sitting on the side of the bed? [] 1   [] 2   [] 3   [x] 4   How much help from another person does the patient currently need. .. Total A Lot A Little None   4. Moving to and from a bed to a chair (including a wheelchair)? [] 1   [] 2   [x] 3   [] 4   5. Need to walk in hospital room? [] 1   [] 2   [] 3   [x] 4   6. Climbing 3-5 steps with a railing? [] 1   [] 2   [x] 3   [] 4   © 2007, Trustees of [facility], under license to [employer]. All rights reserved     Score:  Initial: 21 Most Recent: X (Date: -- )    Interpretation of Tool:  Represents activities that are increasingly more difficult (i.e. Bed mobility, Transfers, Gait). PLAN:   FREQUENCY/DURATION: PT Plan of Care: 3 times/week for duration of hospital stay or until stated goals are met, whichever comes first.    PROBLEM LIST:   (Skilled intervention is medically necessary to address:)  1. Decreased Activity Tolerance  2. Decreased Gait Ability   INTERVENTIONS PLANNED:   (Benefits and precautions of physical therapy have been discussed with the patient.)  1. Therapeutic Activity  2.  Therapeutic Exercise/HEP  3. Neuromuscular Re-education  4. Gait Training  5. Education     TREATMENT:     EVALUATION: Low Complexity : (Untimed Charge)    TREATMENT:   ($$ Therapeutic Activity: 23-37 mins    )  Co-Treatment PT/OT necessary due to patient's decreased overall endurance/tolerance levels, as well as need for high level skilled assistance to complete functional transfers/mobility and functional tasks  Therapeutic Activity (23 Minutes): Therapeutic activity included Rolling, Supine to Sit, Ambulation on level ground and Standing balance to improve functional Mobility, Strength and Activity tolerance.     TREATMENT GRID:  N/A    AFTER TREATMENT POSITION/PRECAUTIONS:  Chair, Needs within reach and RN notified    INTERDISCIPLINARY COLLABORATION:  RN/PCT, PT/PTA and OT/SPICER    TOTAL TREATMENT DURATION:  PT Patient Time In/Time Out  Time In: 1039  Time Out: 201 [facility] Geronimo Root DPT

## 2021-09-29 PROBLEM — R06.02 SHORTNESS OF BREATH: Status: RESOLVED | Noted: 2021-09-20 | Resolved: 2021-09-29

## 2021-09-29 PROBLEM — J96.01 ACUTE RESPIRATORY FAILURE WITH HYPOXIA (HCC): Status: RESOLVED | Noted: 2021-09-20 | Resolved: 2021-09-29

## 2021-09-29 PROBLEM — U07.1 COVID-19: Status: RESOLVED | Noted: 2021-09-20 | Resolved: 2021-09-29

## 2021-09-29 LAB
ANION GAP SERPL CALC-SCNC: 7 MMOL/L (ref 7–16)
BASOPHILS # BLD: 0 K/UL (ref 0–0.2)
BASOPHILS NFR BLD: 1 % (ref 0–2)
BUN SERPL-MCNC: 24 MG/DL (ref 6–23)
CALCIUM SERPL-MCNC: 9.1 MG/DL (ref 8.3–10.4)
CHLORIDE SERPL-SCNC: 103 MMOL/L (ref 98–107)
CO2 SERPL-SCNC: 28 MMOL/L (ref 21–32)
CREAT SERPL-MCNC: 0.89 MG/DL (ref 0.8–1.5)
DIFFERENTIAL METHOD BLD: ABNORMAL
EOSINOPHIL # BLD: 0.2 K/UL (ref 0–0.8)
EOSINOPHIL NFR BLD: 5 % (ref 0.5–7.8)
ERYTHROCYTE [DISTWIDTH] IN BLOOD BY AUTOMATED COUNT: 12.7 % (ref 11.9–14.6)
GLUCOSE SERPL-MCNC: 96 MG/DL (ref 65–100)
HCT VFR BLD AUTO: 40.8 % (ref 41.1–50.3)
HGB BLD-MCNC: 13.8 G/DL (ref 13.6–17.2)
IMM GRANULOCYTES # BLD AUTO: 0.2 K/UL (ref 0–0.5)
IMM GRANULOCYTES NFR BLD AUTO: 5 % (ref 0–5)
LYMPHOCYTES # BLD: 1.6 K/UL (ref 0.5–4.6)
LYMPHOCYTES NFR BLD: 37 % (ref 13–44)
MAGNESIUM SERPL-MCNC: 2.1 MG/DL (ref 1.8–2.4)
MCH RBC QN AUTO: 30.5 PG (ref 26.1–32.9)
MCHC RBC AUTO-ENTMCNC: 33.8 G/DL (ref 31.4–35)
MCV RBC AUTO: 90.3 FL (ref 79.6–97.8)
MONOCYTES # BLD: 0.5 K/UL (ref 0.1–1.3)
MONOCYTES NFR BLD: 11 % (ref 4–12)
NEUTS SEG # BLD: 1.9 K/UL (ref 1.7–8.2)
NEUTS SEG NFR BLD: 41 % (ref 43–78)
NRBC # BLD: 0 K/UL (ref 0–0.2)
PHOSPHATE SERPL-MCNC: 3.9 MG/DL (ref 2.5–4.5)
PLATELET # BLD AUTO: 281 K/UL (ref 150–450)
PLATELET COMMENTS,PCOM: ADEQUATE
PMV BLD AUTO: 10.3 FL (ref 9.4–12.3)
POTASSIUM SERPL-SCNC: 4.1 MMOL/L (ref 3.5–5.1)
RBC # BLD AUTO: 4.52 M/UL (ref 4.23–5.6)
RBC MORPH BLD: ABNORMAL
SODIUM SERPL-SCNC: 138 MMOL/L (ref 138–145)
WBC # BLD AUTO: 4.4 K/UL (ref 4.3–11.1)
WBC MORPH BLD: ABNORMAL

## 2021-09-29 PROCEDURE — 74011250637 HC RX REV CODE- 250/637: Performed by: INTERNAL MEDICINE

## 2021-09-29 PROCEDURE — 99232 SBSQ HOSP IP/OBS MODERATE 35: CPT | Performed by: INTERNAL MEDICINE

## 2021-09-29 PROCEDURE — 65270000029 HC RM PRIVATE

## 2021-09-29 PROCEDURE — 74011250636 HC RX REV CODE- 250/636: Performed by: INTERNAL MEDICINE

## 2021-09-29 PROCEDURE — 77010033711 HC HIGH FLOW OXYGEN

## 2021-09-29 PROCEDURE — 74011250637 HC RX REV CODE- 250/637: Performed by: NURSE PRACTITIONER

## 2021-09-29 PROCEDURE — 94664 DEMO&/EVAL PT USE INHALER: CPT

## 2021-09-29 PROCEDURE — 83735 ASSAY OF MAGNESIUM: CPT

## 2021-09-29 PROCEDURE — 80048 BASIC METABOLIC PNL TOTAL CA: CPT

## 2021-09-29 PROCEDURE — 97110 THERAPEUTIC EXERCISES: CPT | Performed by: PHYSICAL THERAPIST

## 2021-09-29 PROCEDURE — 94640 AIRWAY INHALATION TREATMENT: CPT

## 2021-09-29 PROCEDURE — 85025 COMPLETE CBC W/AUTO DIFF WBC: CPT

## 2021-09-29 PROCEDURE — 74011250637 HC RX REV CODE- 250/637: Performed by: EMERGENCY MEDICINE

## 2021-09-29 PROCEDURE — 94762 N-INVAS EAR/PLS OXIMTRY CONT: CPT

## 2021-09-29 PROCEDURE — 84100 ASSAY OF PHOSPHORUS: CPT

## 2021-09-29 RX ORDER — ALBUTEROL SULFATE 90 UG/1
2 AEROSOL, METERED RESPIRATORY (INHALATION)
Status: DISCONTINUED | OUTPATIENT
Start: 2021-09-29 | End: 2021-10-01

## 2021-09-29 RX ADMIN — ENOXAPARIN SODIUM 40 MG: 40 INJECTION SUBCUTANEOUS at 17:14

## 2021-09-29 RX ADMIN — FAMOTIDINE 20 MG: 20 TABLET, FILM COATED ORAL at 17:14

## 2021-09-29 RX ADMIN — Medication 10 ML: at 04:18

## 2021-09-29 RX ADMIN — HYDROCODONE BITARTRATE AND ACETAMINOPHEN 1 TABLET: 7.5; 325 TABLET ORAL at 17:14

## 2021-09-29 RX ADMIN — HYDROCODONE BITARTRATE AND ACETAMINOPHEN 1 TABLET: 7.5; 325 TABLET ORAL at 23:13

## 2021-09-29 RX ADMIN — ZOLPIDEM TARTRATE 5 MG: 5 TABLET ORAL at 21:07

## 2021-09-29 RX ADMIN — HYDROCODONE BITARTRATE AND ACETAMINOPHEN 1 TABLET: 7.5; 325 TABLET ORAL at 04:18

## 2021-09-29 RX ADMIN — Medication 10 ML: at 21:07

## 2021-09-29 RX ADMIN — ENOXAPARIN SODIUM 40 MG: 40 INJECTION SUBCUTANEOUS at 04:17

## 2021-09-29 RX ADMIN — DEXAMETHASONE SODIUM PHOSPHATE 5 MG: 10 INJECTION INTRAMUSCULAR; INTRAVENOUS at 14:40

## 2021-09-29 RX ADMIN — ALBUTEROL SULFATE 2 PUFF: 90 AEROSOL, METERED RESPIRATORY (INHALATION) at 19:55

## 2021-09-29 RX ADMIN — Medication 10 ML: at 14:40

## 2021-09-29 RX ADMIN — HYDROCODONE BITARTRATE AND ACETAMINOPHEN 1 TABLET: 7.5; 325 TABLET ORAL at 11:22

## 2021-09-29 RX ADMIN — FAMOTIDINE 20 MG: 20 TABLET, FILM COATED ORAL at 07:59

## 2021-09-29 NOTE — PROGRESS NOTES
Patient resting quietly in bed. Respirations even and unlabored. On airvo 60L 60% with O2 sat at 95%. No signs of distress. No needs expressed. Report given to Liliam Wright RN.

## 2021-09-29 NOTE — PROGRESS NOTES
Hospitalist Progress Note   Admit Date:  2021  1:19 PM   Name:  Lisa Pham   Age:  42 y.o. Sex:  male  :  1980   MRN:  [id number]   Room:  [facility]/    Presenting Complaint: Respiratory Distress    Reason(s) for Admission: Acute hypoxemic respiratory failure due to COVID-19 ([facility] [U07.1, J96.01]     Hospital Course & Interval History: This is a 40-year-old male with no prior significant past medical history presented to the ER with worsening shortness of breath for almost 2 weeks prior to presentation. He tested positive for Covid on 9/10/2021. When EMS arrived, they were unable to  his oxygen saturation at home and was placed on 15 L oxygen with improvement of sats in the 80s. Chest x-ray reveals bilateral atypical pneumonia. CRP on admission 9.1. Patient is not vaccinated for COVID-19. He was initially admitted to the ICU on BiPAP and subsequently transitioned to AirVo. He was transferred to medical floor on . He received Tocilizumab on 2021. He is out of the window for remdesivir as symptoms have been present for more than 2 weeks prior to admission. He received dexamethasone currently on 5 mg daily. After patient was transferred to medical floor hospitalist was consulted to assume care. Subjective (21):  Patient still needing AirVo currently on 60 L to maintain sats greater than 88%. He denies any chest pain palpitations nausea vomiting abdominal pain diarrhea. Assessment & Plan:     Principal Problem:    Acute hypoxemic respiratory failure POA due to COVID-19 ([facility] (2021)  COVID-19 pneumonia POA  Still needing AirVo 60 L to maintain sats greater than 88%. Incentive spirometry. Continue dexamethasone. Not a candidate for remdesivir as symptoms have been present for more than 2 weeks prior to admission. Patient received Tocilizumab on 2021. Albuterol as needed.     Leukopenia  Improved        Dispo/Discharge Planning: Anticipate inpatient hospital stay for at least 3 to 5 days until oxygen requirements improved. Diet:  ADULT DIET Regular  ADULT ORAL NUTRITION SUPPLEMENT Breakfast, Lunch, Dinner; Standard High Calorie/High Protein  DVT PPx: Lovenox  GI prophylaxis Pepcid    Code status: Full Code    Hospital Problems as of 9/29/2021 Date Reviewed: 9/22/2021        Codes Class Noted - Resolved POA    Leukopenia ICD-10-CM: D72.819  ICD-9-CM: 288.50  9/21/2021 - Present Unknown        Pneumonia due to COVID-19 virus ICD-10-CM: U07.1, J12.82  ICD-9-CM: 480.8, 079.89  9/20/2021 - Present Unknown        * (Principal) Acute hypoxemic respiratory failure due to COVID-19 [facility]) ICD-10-CM: U07.1, J96.01  ICD-9-CM: 518.81, 079.89, 799.02  9/20/2021 - Present Unknown        RESOLVED: COVID-19 ICD-10-CM: U07.1  ICD-9-CM: 079.89  9/20/2021 - 9/29/2021 Unknown        RESOLVED: Acute respiratory failure with hypoxia (HCC) ICD-10-CM: J96.01  ICD-9-CM: 518.81  9/20/2021 - 9/29/2021 Unknown        RESOLVED: Shortness of breath ICD-10-CM: R06.02  ICD-9-CM: 786.05  9/20/2021 - 9/29/2021 Unknown              Objective:     Patient Vitals for the past 24 hrs:   Temp Pulse Resp BP SpO2   09/29/21 1117 98 °F (36.7 °C) 89 20 107/71 93 %   09/29/21 0808     94 %   09/29/21 0745 98.2 °F (36.8 °C) 65 20 107/66 96 %   09/29/21 0311 98.5 °F (36.9 °C) 77 20 107/71 97 %   09/28/21 2239 98.7 °F (37.1 °C) 70 20 115/74 98 %   09/28/21 1947 98.9 °F (37.2 °C) 82 20 100/63 96 %   09/28/21 1540 98.4 °F (36.9 °C) 82 20 104/66 91 %     Oxygen Therapy  O2 Sat (%): 93 % (09/29/21 1117)  Pulse via Oximetry: 82 beats per minute (09/29/21 0808)  O2 Device: Heated; Hi flow nasal cannula (09/29/21 8145)  Skin Assessment: Clean, dry, & intact (09/28/21 0715)  Skin Protection for O2 Device: Yes (09/27/21 0745)  Orientation: Middle (09/27/21 0745)  Location: Other (Comment) (nasal bridge) (09/27/21 0745)  Interventions: Mouth Care;Reposition Device (09/27/21 1600)  O2 Flow Rate (L/min): 60 l/min (09/29/21 0808)  O2 Temperature: 87.8 °F (31 °C) (09/29/21 0808)  FIO2 (%): 60 % (09/29/21 0808)    Estimated body mass index is 25.68 kg/m² as calculated from the following:    Height as of this encounter: 6' (1.829 m). Weight as of this encounter: 85.9 kg (189 lb 6 oz). Intake/Output Summary (Last 24 hours) at 9/29/2021 1349  Last data filed at 9/29/2021 0618  Gross per 24 hour   Intake    Output 850 ml   Net -850 ml         Physical Exam:     General:    Well nourished. Currently needing AirVo 60L,   Head:  Normocephalic, atraumatic  Eyes:  Sclerae appear normal.  Pupils equally round. ENT:  Nares appear normal, no drainage. Moist oral mucosa  Neck:  No restricted ROM. Trachea midline   CV:   RRR. No m/r/g. No jugular venous distension. Lungs:   CTAB. Mild end expiratory wheezing, no crackles. Respirations even, unlabored  Abdomen: Bowel sounds present. Soft, nontender, nondistended. Extremities: No cyanosis or clubbing. No edema  Skin:     No rashes and normal coloration. Warm and dry. Neuro:  Cranial nerves II-XII grossly intact. Sensation intact  Psych:  Normal mood and affect.   Alert and oriented x3    I have reviewed ordered lab tests and independently visualized imaging below:    Last 24hr Labs:  Recent Results (from the past 24 hour(s))   METABOLIC PANEL, BASIC    Collection Time: 09/29/21  4:26 AM   Result Value Ref Range    Sodium 138 138 - 145 mmol/L    Potassium 4.1 3.5 - 5.1 mmol/L    Chloride 103 98 - 107 mmol/L    CO2 28 21 - 32 mmol/L    Anion gap 7 7 - 16 mmol/L    Glucose 96 65 - 100 mg/dL    BUN 24 (H) 6 - 23 MG/DL    Creatinine 0.89 0.8 - 1.5 MG/DL    GFR est AA >60 >60 ml/min/1.73m2    GFR est non-AA >60 >60 ml/min/1.73m2    Calcium 9.1 8.3 - 10.4 MG/DL   MAGNESIUM    Collection Time: 09/29/21  4:26 AM   Result Value Ref Range    Magnesium 2.1 1.8 - 2.4 mg/dL   PHOSPHORUS    Collection Time: 09/29/21  4:26 AM   Result Value Ref Range Phosphorus 3.9 2.5 - 4.5 MG/DL   CBC WITH AUTOMATED DIFF    Collection Time: 09/29/21  4:26 AM   Result Value Ref Range    WBC 4.4 4.3 - 11.1 K/uL    RBC 4.52 4.23 - 5.6 M/uL    HGB 13.8 13.6 - 17.2 g/dL    HCT 40.8 (L) 41.1 - 50.3 %    MCV 90.3 79.6 - 97.8 FL    MCH 30.5 26.1 - 32.9 PG    MCHC 33.8 31.4 - 35.0 g/dL    RDW 12.7 11.9 - 14.6 %    PLATELET 086 063 - 321 K/uL    MPV 10.3 9.4 - 12.3 FL    ABSOLUTE NRBC 0.00 0.0 - 0.2 K/uL    NEUTROPHILS 41 (L) 43 - 78 %    LYMPHOCYTES 37 13 - 44 %    MONOCYTES 11 4.0 - 12.0 %    EOSINOPHILS 5 0.5 - 7.8 %    BASOPHILS 1 0.0 - 2.0 %    IMMATURE GRANULOCYTES 5 0.0 - 5.0 %    ABS. NEUTROPHILS 1.9 1.7 - 8.2 K/UL    ABS. LYMPHOCYTES 1.6 0.5 - 4.6 K/UL    ABS. MONOCYTES 0.5 0.1 - 1.3 K/UL    ABS. EOSINOPHILS 0.2 0.0 - 0.8 K/UL    ABS. BASOPHILS 0.0 0.0 - 0.2 K/UL    ABS. IMM. GRANS. 0.2 0.0 - 0.5 K/UL    RBC COMMENTS NORMOCYTIC/NORMOCHROMIC      WBC COMMENTS WBC'S APPEAR ABNORMAL/IMMATURE/ATYPICAL      PLATELET COMMENTS ADEQUATE      DF AUTOMATED         All Micro Results     None          Other Studies:  No results found.     Current Meds:  Current Facility-Administered Medications   Medication Dose Route Frequency    albuterol (PROVENTIL HFA, VENTOLIN HFA, PROAIR HFA) inhaler 2 Puff  2 Puff Inhalation Q6H RT    famotidine (PEPCID) tablet 20 mg  20 mg Oral BID    dexamethasone (DECADRON) 10 mg/mL injection 5 mg  5 mg IntraVENous Q24H    melatonin tablet 5 mg  5 mg Oral QHS    zolpidem (AMBIEN) tablet 5 mg  5 mg Oral QHS PRN    phenol throat spray (CHLORASEPTIC) 1 Spray  1 Spray Oral PRN    HYDROcodone-acetaminophen (NORCO) 7.5-325 mg per tablet 1 Tablet  1 Tablet Oral Q6H    NUTRITIONAL SUPPORT ELECTROLYTE PRN ORDERS   Does Not Apply PRN    central line flush (saline) syringe 10 mL  10 mL InterCATHeter Q8H    LORazepam (ATIVAN) injection 1 mg  1 mg IntraVENous Q6H PRN    sodium chloride (NS) flush 5-40 mL  5-40 mL IntraVENous Q8H    sodium chloride (NS) flush 5-40 mL  5-40 mL IntraVENous Q8H    sodium chloride (NS) flush 5-40 mL  5-40 mL IntraVENous PRN    acetaminophen (TYLENOL) tablet 650 mg  650 mg Oral Q4H PRN    bisacodyL (DULCOLAX) suppository 10 mg  10 mg Rectal DAILY PRN    enoxaparin (LOVENOX) injection 40 mg  40 mg SubCUTAneous Q12H    morphine injection 2 mg  2 mg IntraVENous Q4H PRN       Signed:  Bhupinder Rebolledo MD    Part of this note may have been written by using a voice dictation software. The note has been proof read but may still contain some grammatical/other typographical errors.

## 2021-09-29 NOTE — PROGRESS NOTES
ACUTE PHYSICAL THERAPY GOALS:  (Developed with and agreed upon by patient and/or caregiver. )  LTG:  (1.)Mr. Elisha Macias will move from supine to sit and sit to supine , scoot up and down and roll side to side in bed with MODIFIED INDEPENDENCE within 7 treatment day(s). (2.)Mr. Elisha Macias will transfer from bed to chair and chair to bed with INDEPENDENCE using the least restrictive device within 7 treatment day(s). (3.)Mr. Elisha Macias will ambulate with INDEPENDENCE for 200 feet with the least restrictive device within 7 treatment day(s). (4.)Mr. Elisha aMcias will participate in therapeutic activity/exercises x 25 minutes for increased strength within 7 treatment days. PHYSICAL THERAPY: Daily Note and PM Treatment Day # 2    Omar Scott is a 42 y.o. male   PRIMARY DIAGNOSIS: Acute hypoxemic respiratory failure due to COVID-19 [facility])  [facility] [U07.1, J96.01]         ASSESSMENT:     REHAB RECOMMENDATIONS: CURRENT LEVEL OF FUNCTION:  (Most Recently Demonstrated)   Recommendation to date pending progress:  Setting:   Outpatient Therapy  Equipment:    None Bed Mobility:   Not tested  Sit to Stand:   Standby Assistance  Transfers:   Standby Assistance  Gait/Mobility:   Standby Assistance     ASSESSMENT:  Mr. Elisha Macias presents in bedside chair on 60L/60% at 91% O2. During ambulation, he mildly de-saturated to mid 80s, but recovered with repetitive, multi-modal cues for pursed lip breathing. Upon entering, Pnt is agreeable to PT treatment. he reports no pain in his chest at rest. Pnt performed Sit > stand with SBA using nothing. Gait 7  x 10 ft with SBA, cues for pursed-lip breathing. Gait is noted to be slow but steady. Stand > sit with SBA, followed by positioning for comfort. At end of session pt up in bedside chair with all needs within reach, alarm activated for safety, RN notified.  Overall, good progress today as pnt improved in activity tolerance. Pnt continues to present as functioning below his baseline, with deficits in mobility including transfers, gait, balance, and activity tolerance. Pt will continue to benefit from skilled therapy services to address stated deficits to promote return to highest level of function, independence, and safety. Will continue to follow.          SUBJECTIVE:   Mr. Poly Bird states, \"I didn't get the vaccine because a vaccine killed my grandfather\"    SOCIAL HISTORY/ LIVING ENVIRONMENT: see al  Support Systems: Spouse/Significant Other  OBJECTIVE:     PAIN: VITAL SIGNS: LINES/DRAINS:   Pre Treatment: Pain Screen  Pain Scale 1: Numeric (0 - 10)  Pain Intensity 1: 0  Post Treatment: 0   Continuous Pulse Oximetry  O2 Device: Heated, Hi flow nasal cannula     MOBILITY: I Mod I S SBA CGA Min Mod Max Total  NT x2 Comments:   Bed Mobility    Rolling [] [] [] [] [] [] [] [] [] [x] []    Supine to Sit [] [] [] [] [] [] [] [] [] [x] []    Scooting [] [] [] [] [] [] [] [] [] [x] []    Sit to Supine [] [] [] [] [] [] [] [] [] [x] []    Transfers    Sit to Stand [] [] [] [x] [] [] [] [] [] [] []    Bed to Chair [] [] [] [] [] [] [] [] [] [x] []    Stand to Sit [] [] [] [x] [] [] [] [] [] [] []    I=Independent, Mod I=Modified Independent, S=Supervision, SBA=Standby Assistance, CGA=Contact Guard Assistance,   Min=Minimal Assistance, Mod=Moderate Assistance, Max=Maximal Assistance, Total=Total Assistance, NT=Not Tested    BALANCE: Good Fair+ Fair Fair- Poor NT Comments   Sitting Static [] [x] [] [] [] []    Sitting Dynamic [] [x] [] [] [] []              Standing Static [] [] [x] [] [] []    Standing Dynamic [] [] [x] [] [] []      GAIT: I Mod I S SBA CGA Min Mod Max Total  NT x2 Comments:   Level of Assistance [] [] [] [x] [] [] [] [] [] [] []    Distance 7 x 10'    DME None    Gait Quality slow    Weightbearing  Status N/A     I=Independent, Mod I=Modified Independent, S=Supervision, SBA=Standby Assistance, CGA=Contact [employer],   Min=Minimal Assistance, Mod=Moderate Assistance, Max=Maximal Assistance, Total=Total Assistance, NT=Not Tested    PLAN:   FREQUENCY/DURATION: PT Plan of Care: 3 times/week for duration of hospital stay or until stated goals are met, whichever comes first.  TREATMENT:     TREATMENT:   ($$ Therapeutic Exercises: 23-37 mins    )  Therapeutic Exercise (28 Minutes): Therapeutic exercises noted below to improve functional activity tolerance.      TREATMENT GRID:  N/A    AFTER TREATMENT POSITION/PRECAUTIONS:  Chair, Needs within reach and RN notified    INTERDISCIPLINARY COLLABORATION:  RN/PCT and PT/PTA    TOTAL TREATMENT DURATION:  PT Patient Time In/Time Out  Time In: [facility]  Time Out: [place] [id number]

## 2021-09-29 NOTE — PROGRESS NOTES
Pt sitting up in chair after working with PT, no distress noted at this time. Pt airvo in place at 60L 60%, call light in reach, will monitor.

## 2021-09-29 NOTE — PROGRESS NOTES
Assumed care of pt. Pt resting in bed with eyes closed, airvo in place at 60L 60% with sat 93% at this time. No distress noted at this time.

## 2021-09-29 NOTE — PROGRESS NOTES
[address] [facility], [address]  Admission Date: 9/20/2021         Daily Progress Note: 9/29/2021    The patient's chart is reviewed and the patient is discussed with the staff. Background: Patient is D 66 y.o.  male seen and evaluated at the request of Dr. Adrienne Bledsoe for dyspnea and COVID 19.  No medical history. The patient presented to the ED with c/o worsening shortness of breath for almost two weeks.  The patient tested positive for COVID at [facility] 9/10/2021.  The patient c/o sinus congestion and cough.  EMS was unable to  O2 sat at home and patient was placed on 15L O2 with improvement of O2 sats in the 80s, no fever noted on arrival to ED [facility] reveals bilateral atypical pneumonia, CRP 9.1, ABG reveals pH 7.43, CO2 37.1, O2 87, HCO3 24.6.      The pt is unvaccinated. He has had fever and difficulty taking deep breaths. He reports some pain with breathing but no hemoptysis. He has not had any purulent sputum.      Date of COVID-19 symptom onset: 9/6/21  COVID-19 Meds:  Dexamethasone 10mg daily: 9/24/21-9/28; now 5mg 9/29/21-  Remdesivir: out of window  Actemra: 9/20/21  Monoclonal Abs: none  Vaccination:    None    No results found for requested labs within last 30 days. Subjective:   Feeling better with breathing. Coughing up clear sputum. Using chloraseptic spray for sore throat. Eating and drinking well. Exercising as much as he can. Currently on 60L, 60%.  sats 93%    Current Facility-Administered Medications   Medication Dose Route Frequency    famotidine (PEPCID) tablet 20 mg  20 mg Oral BID    dexamethasone (DECADRON) 10 mg/mL injection 5 mg  5 mg IntraVENous Q24H    melatonin tablet 5 mg  5 mg Oral QHS    zolpidem (AMBIEN) tablet 5 mg  5 mg Oral QHS PRN    phenol throat spray (CHLORASEPTIC) 1 Spray  1 Spray Oral PRN    HYDROcodone-acetaminophen (NORCO) 7.5-325 mg per tablet 1 Tablet  1 Tablet Oral Q6H    NUTRITIONAL SUPPORT ELECTROLYTE PRN ORDERS Does Not Apply PRN    central line flush (saline) syringe 10 mL  10 mL InterCATHeter Q8H    LORazepam (ATIVAN) injection 1 mg  1 mg IntraVENous Q6H PRN    sodium chloride (NS) flush 5-40 mL  5-40 mL IntraVENous Q8H    sodium chloride (NS) flush 5-40 mL  5-40 mL IntraVENous Q8H    sodium chloride (NS) flush 5-40 mL  5-40 mL IntraVENous PRN    acetaminophen (TYLENOL) tablet 650 mg  650 mg Oral Q4H PRN    bisacodyL (DULCOLAX) suppository 10 mg  10 mg Rectal DAILY PRN    enoxaparin (LOVENOX) injection 40 mg  40 mg SubCUTAneous Q12H    morphine injection 2 mg  2 mg IntraVENous Q4H PRN     Review of Systems  +cough, SOB  Constitutional: negative for fever, chills, sweats  Cardiovascular: negative for chest pain, palpitations, syncope, edema  Gastrointestinal:  negative for dysphagia, reflux, vomiting, diarrhea, abdominal pain, or melena  Neurologic:  negative for focal weakness, numbness, headache  Objective:     Vitals:    09/28/21 2239 09/29/21 0311 09/29/21 0745 09/29/21 0808   BP: 115/74 107/71 107/66    Pulse: 70 77 65    Resp: 20 20 20    Temp: 98.7 °F (37.1 °C) 98.5 °F (36.9 °C) 98.2 °F (36.8 °C)    SpO2: 98% 97% 96% 94%   Weight:       Height:           Intake/Output Summary (Last 24 hours) at 9/29/2021 1005  Last data filed at 9/29/2021 0618  Gross per 24 hour   Intake    Output 850 ml   Net -850 ml     Physical Exam:   Constitution:  the patient is well developed and in no acute distress  HEENT:  Sclera clear, pupils equal, oral mucosa moist  Respiratory: intermittent wheeze, diminished in bases  Cardiovascular:  RRR without M,G,R  Gastrointestinal: soft and non-tender; with positive bowel sounds. Musculoskeletal: warm without cyanosis. There is no lower extremity edema.   Skin:  no jaundice or rashes, no wounds   Neurologic: no gross neuro deficits     Psychiatric:  alert and oriented x 4    CXR:   9/25/21 9/23/21    LAB:  Recent Labs     09/29/21  0426 09/28/21  0233 09/27/21  0209   WBC 4.4 2. 7* 2.8*   HGB 13.8 13.5* 14.4   HCT 40.8* 40.1* 42.0    293 325     Recent Labs     09/29/21  0426 09/28/21  0233 09/27/21  0209    139 141   K 4.1 4.0 3.8    108* 109*   CO2 28 24 24   GLU 96 110* 122*   BUN 24* 22 27*   CREA 0.89 0.60* 0.67*   MG 2.1  --  2.2   CA 9.1 8.3 8.4   PHOS 3.9  --  2.8     No results for input(s): LAC, TROPHS, BNPNT, CRP in the last 72 hours. No lab exists for component: ESR  Recent Labs     09/27/21  0258   PHI 7.40   PCO2I 37.1   PO2I 63*   HCO3I 23.1     No results for input(s): SDES, CULT in the last 72 hours. Assessment and Plan:  (Medical Decision Making)   Principal Problem:    Acute hypoxemic respiratory failure due to COVID-19 ([facility] [address] (9/20/2021)   --continue to wean O2 as tolerated. --start IS  --continue decadron  --start albuterol inhaler for wheezing    Active Problems:    Pneumonia due to COVID-19 virus (9/20/2021)   --cxr without much change. Some increased congestion questioned. Can monitor      Leukopenia (9/21/2021)   --improved today. More than 50% of the time documented was spent in face-to-face contact with the patient and in the care of the patient on the floor/unit where the patient is located. Smita Berrios NP     I have spoken with and examined the patient. I agree with the above assessment and plan as documented.     Gen: awake, up in a chair, appears strong  Lungs:  clear  Heart:  RRR with no Murmur/Rubs/Gallops  Abd:RRR  Ext: soft, NT    Moved from ICU yesterday, down already to 55 % airvo, if continues to improve we can likely signs off tomorrow      Ilene Hernandez MD

## 2021-09-29 NOTE — PROGRESS NOTES
Pt sitting up in chair. No distress noted at this time. Pt on airvo at 60L 60%  At this time. Call light in reach, will monitor.

## 2021-09-29 NOTE — PROGRESS NOTES
MSN, CM:  Patient currently requiring 60/60 Airvo. Patient has received Decadron and Actemra during admission. Patient will needs HH when medically stable for discharge. Case Management will continue to follow.

## 2021-09-29 NOTE — PROGRESS NOTES
Pt sitting up in bed. Pt awakens when spoken to but drowsy. Pt on airvo at 60L 60%. No distress noted at this time. Pt encouraged to call for assistance if needed call light in reach, will monitor.

## 2021-09-30 ENCOUNTER — APPOINTMENT (OUTPATIENT)
Dept: GENERAL RADIOLOGY | Age: 41
DRG: 871 | End: 2021-09-30
Attending: NURSE PRACTITIONER
Payer: COMMERCIAL

## 2021-09-30 PROCEDURE — 77010033711 HC HIGH FLOW OXYGEN

## 2021-09-30 PROCEDURE — 97110 THERAPEUTIC EXERCISES: CPT

## 2021-09-30 PROCEDURE — 94640 AIRWAY INHALATION TREATMENT: CPT

## 2021-09-30 PROCEDURE — 99232 SBSQ HOSP IP/OBS MODERATE 35: CPT | Performed by: INTERNAL MEDICINE

## 2021-09-30 PROCEDURE — 65270000029 HC RM PRIVATE

## 2021-09-30 PROCEDURE — 74011250636 HC RX REV CODE- 250/636: Performed by: INTERNAL MEDICINE

## 2021-09-30 PROCEDURE — 74011250637 HC RX REV CODE- 250/637: Performed by: NURSE PRACTITIONER

## 2021-09-30 PROCEDURE — 97535 SELF CARE MNGMENT TRAINING: CPT

## 2021-09-30 PROCEDURE — 74011250637 HC RX REV CODE- 250/637: Performed by: EMERGENCY MEDICINE

## 2021-09-30 PROCEDURE — 71045 X-RAY EXAM CHEST 1 VIEW: CPT

## 2021-09-30 PROCEDURE — 94762 N-INVAS EAR/PLS OXIMTRY CONT: CPT

## 2021-09-30 PROCEDURE — 74011250637 HC RX REV CODE- 250/637: Performed by: INTERNAL MEDICINE

## 2021-09-30 RX ADMIN — ALBUTEROL SULFATE 2 PUFF: 90 AEROSOL, METERED RESPIRATORY (INHALATION) at 02:39

## 2021-09-30 RX ADMIN — ALBUTEROL SULFATE 2 PUFF: 90 AEROSOL, METERED RESPIRATORY (INHALATION) at 08:00

## 2021-09-30 RX ADMIN — Medication 10 ML: at 06:02

## 2021-09-30 RX ADMIN — HYDROCODONE BITARTRATE AND ACETAMINOPHEN 1 TABLET: 7.5; 325 TABLET ORAL at 11:59

## 2021-09-30 RX ADMIN — Medication 10 ML: at 12:59

## 2021-09-30 RX ADMIN — ALBUTEROL SULFATE 2 PUFF: 90 AEROSOL, METERED RESPIRATORY (INHALATION) at 20:00

## 2021-09-30 RX ADMIN — HYDROCODONE BITARTRATE AND ACETAMINOPHEN 1 TABLET: 7.5; 325 TABLET ORAL at 17:09

## 2021-09-30 RX ADMIN — HYDROCODONE BITARTRATE AND ACETAMINOPHEN 1 TABLET: 7.5; 325 TABLET ORAL at 23:04

## 2021-09-30 RX ADMIN — ENOXAPARIN SODIUM 40 MG: 40 INJECTION SUBCUTANEOUS at 17:09

## 2021-09-30 RX ADMIN — Medication 5 MG: at 23:04

## 2021-09-30 RX ADMIN — HYDROCODONE BITARTRATE AND ACETAMINOPHEN 1 TABLET: 7.5; 325 TABLET ORAL at 06:01

## 2021-09-30 RX ADMIN — ALBUTEROL SULFATE 2 PUFF: 90 AEROSOL, METERED RESPIRATORY (INHALATION) at 14:00

## 2021-09-30 RX ADMIN — FAMOTIDINE 20 MG: 20 TABLET, FILM COATED ORAL at 08:34

## 2021-09-30 RX ADMIN — Medication 10 ML: at 23:05

## 2021-09-30 RX ADMIN — Medication 10 ML: at 23:04

## 2021-09-30 RX ADMIN — DEXAMETHASONE SODIUM PHOSPHATE 5 MG: 10 INJECTION INTRAMUSCULAR; INTRAVENOUS at 12:58

## 2021-09-30 RX ADMIN — FAMOTIDINE 20 MG: 20 TABLET, FILM COATED ORAL at 17:09

## 2021-09-30 RX ADMIN — Medication 10 ML: at 06:01

## 2021-09-30 RX ADMIN — ENOXAPARIN SODIUM 40 MG: 40 INJECTION SUBCUTANEOUS at 06:01

## 2021-09-30 NOTE — PROGRESS NOTES
[address] [facility], [address]  Admission Date: 9/20/2021         Daily Progress Note: 9/30/2021    The patient's chart is reviewed and the patient is discussed with the staff. Background: Patient is V 36 y.o.  male seen and evaluated at the request of Dr. Tucker Ocampo for dyspnea and COVID 19.  No medical history. The patient presented to the ED with c/o worsening shortness of breath for almost two weeks.  The patient tested positive for COVID at [facility] 9/10/2021.  The patient c/o sinus congestion and cough.  EMS was unable to  O2 sat at home and patient was placed on 15L O2 with improvement of O2 sats in the 80s, no fever noted on arrival to ED [facility] reveals bilateral atypical pneumonia, CRP 9.1, ABG reveals pH 7.43, CO2 37.1, O2 87, HCO3 24.6.      The pt is unvaccinated. He has had fever and difficulty taking deep breaths. He reports some pain with breathing but no hemoptysis. He has not had any purulent sputum.      Date of COVID-19 symptom onset: 9/6/21  COVID-19 Meds:  Dexamethasone 10mg daily: 9/24/21-9/28; now 5mg 9/29/21-  Remdesivir: out of window  Actemra: 9/20/21  Monoclonal Abs: none  Vaccination:    None    No results found for requested labs within last 30 days. Subjective:   States he had an episode overnight where he was laying on his stomach and had a flood of water in his nose and therefore was up early. States he has noticed improvement with the albuterol. Has not used this am.  Feels some chest tightness currently. Still coughing up some sputum, states similar to previous days.   O2 sats at 88%, so increased to 55L and 65%    Current Facility-Administered Medications   Medication Dose Route Frequency    albuterol (PROVENTIL HFA, VENTOLIN HFA, PROAIR HFA) inhaler 2 Puff  2 Puff Inhalation Q6H RT    famotidine (PEPCID) tablet 20 mg  20 mg Oral BID    dexamethasone (DECADRON) 10 mg/mL injection 5 mg  5 mg IntraVENous Q24H    melatonin tablet 5 mg 5 mg Oral QHS    zolpidem (AMBIEN) tablet 5 mg  5 mg Oral QHS PRN    phenol throat spray (CHLORASEPTIC) 1 Spray  1 Spray Oral PRN    HYDROcodone-acetaminophen (NORCO) 7.5-325 mg per tablet 1 Tablet  1 Tablet Oral Q6H    NUTRITIONAL SUPPORT ELECTROLYTE PRN ORDERS   Does Not Apply PRN    central line flush (saline) syringe 10 mL  10 mL InterCATHeter Q8H    LORazepam (ATIVAN) injection 1 mg  1 mg IntraVENous Q6H PRN    sodium chloride (NS) flush 5-40 mL  5-40 mL IntraVENous Q8H    sodium chloride (NS) flush 5-40 mL  5-40 mL IntraVENous Q8H    sodium chloride (NS) flush 5-40 mL  5-40 mL IntraVENous PRN    acetaminophen (TYLENOL) tablet 650 mg  650 mg Oral Q4H PRN    bisacodyL (DULCOLAX) suppository 10 mg  10 mg Rectal DAILY PRN    enoxaparin (LOVENOX) injection 40 mg  40 mg SubCUTAneous Q12H    morphine injection 2 mg  2 mg IntraVENous Q4H PRN     Review of Systems  +cough, SOB  Constitutional: negative for fever, chills, sweats  Cardiovascular: negative for chest pain, palpitations, syncope, edema  Gastrointestinal:  negative for dysphagia, reflux, vomiting, diarrhea, abdominal pain, or melena  Neurologic:  negative for focal weakness, numbness, headache  Objective:     Vitals:    09/29/21 2235 09/30/21 0239 09/30/21 0324 09/30/21 0734   BP: 107/68  110/75 105/67   Pulse: 94  70 84   Resp: 20  20 21   Temp: 98.9 °F (37.2 °C)  98.5 °F (36.9 °C) 98.8 °F (37.1 °C)   SpO2: 97% 98% 97% 96%   Weight:       Height:           Intake/Output Summary (Last 24 hours) at 9/30/2021 0830  Last data filed at 9/30/2021 0605  Gross per 24 hour   Intake    Output 1350 ml   Net -1350 ml     Physical Exam:   Constitution:  the patient is well developed and in no acute distress  HEENT:  Sclera clear, pupils equal, oral mucosa moist  Respiratory: intermittent wheeze, diminished in bases  Cardiovascular:  RRR without M,G,R  Gastrointestinal: soft and non-tender; with positive bowel sounds.   Musculoskeletal: warm without cyanosis. There is no lower extremity edema. Skin:  no jaundice or rashes, no wounds   Neurologic: no gross neuro deficits     Psychiatric:  alert and oriented x 4    CXR:   9/25/21 9/23/21    LAB:  Recent Labs     09/29/21 0426 09/28/21  0233   WBC 4.4 2.7*   HGB 13.8 13.5*   HCT 40.8* 40.1*    293     Recent Labs     09/29/21  0426 09/28/21  0233    139   K 4.1 4.0    108*   CO2 28 24   GLU 96 110*   BUN 24* 22   CREA 0.89 0.60*   MG 2.1  --    CA 9.1 8.3   PHOS 3.9  --      No results for input(s): LAC, TROPHS, BNPNT, CRP in the last 72 hours. No lab exists for component: ESR  No results for input(s): PH, PCO2, PO2, HCO3, PHI, PCO2I, PO2I, HCO3I in the last 72 hours. No results for input(s): SDES, CULT in the last 72 hours. Assessment and Plan:  (Medical Decision Making)   Principal Problem:    Acute hypoxemic respiratory failure due to COVID-19 ([facility] [address] (9/20/2021)   --O2 increased some this am.  Repeat xray to evaluate further. --using IS appropriately; encouraged ongoing use. --continue decadron  --continue albuterol inhaler for wheezing, chest tightness    Active Problems:    Pneumonia due to COVID-19 virus (9/20/2021)   --pending repeat CXR. Leukopenia (9/21/2021)   --improved, monitor. More than 50% of the time documented was spent in face-to-face contact with the patient and in the care of the patient on the floor/unit where the patient is located. Jayjay Trujillo NP   I have spoken with and examined the patient. I agree with the above assessment and plan as documented.     Gen: alert , up in chair  Lungs:  Crackles bilateral  Heart:  RRR with no Murmur/Rubs/Gallops  Abd:soft   Ext: no edema    Oob, wean O2 -repeat cxr    Radha Conway MD

## 2021-09-30 NOTE — PROGRESS NOTES
Received report from [address] [place] [address]. Patient awake and in recliner. A&O x4. Respirations present. On airvo 60L 60% with O2 sat at 96%. No signs of distress. No needs expressed. Bed low and locked. Call light within reach. Will continue to monitor.

## 2021-09-30 NOTE — PROGRESS NOTES
Hospitalist Progress Note   Admit Date:  2021  1:19 PM   Name:  Jorge Oliva   Age:  42 y.o. Sex:  male  :  1980   MRN:  [id number]   Room:  [facility]/    Presenting Complaint: Respiratory Distress    Reason(s) for Admission: Acute hypoxemic respiratory failure due to COVID-19 ([facility] [address] [U07.1, J96.01]     Hospital Course & Interval History: This is a 58-year-old male with no prior significant past medical history presented to the ER with worsening shortness of breath for almost 2 weeks prior to presentation. He tested positive for Covid on 9/10/2021. When EMS arrived, they were unable to  his oxygen saturation at home and was placed on 15 L oxygen with improvement of sats in the 80s. Chest x-ray reveals bilateral atypical pneumonia. CRP on admission 9.1. Patient is not vaccinated for COVID-19. He was initially admitted to the ICU on BiPAP and subsequently transitioned to AirVo. He was transferred to medical floor on . He received Tocilizumab on 2021. He is out of the window for remdesivir as symptoms have been present for more than 2 weeks prior to admission. He received dexamethasone currently on 5 mg daily. After patient was transferred to medical floor hospitalist was consulted to assume care. Subjective (21):  Patient still needing AirVo currently on 55 L and 65% FiO2 to maintain sats greater than 88%. He  was sitting up in bed this morning. He denies any chest pain. No fever no chills. No nausea no vomiting. Assessment & Plan:     Principal Problem:    Acute hypoxemic respiratory failure POA due to COVID-19 ([facility] [address] (2021)  COVID-19 pneumonia POA  Still needing AirVo 55 L to maintain sats greater than 88%. Incentive spirometry. Continue dexamethasone. Not a candidate for remdesivir as symptoms have been present for more than 2 weeks prior to admission. Patient received Tocilizumab on 2021.   Albuterol as needed. Leukopenia  Improved      Dispo/Discharge Planning: Anticipate inpatient hospital stay for at least 3 to 5 days until oxygen requirements improved. Patient's wife updated regarding current plan of care today. Diet:  ADULT DIET Regular  ADULT ORAL NUTRITION SUPPLEMENT Breakfast, Lunch, Dinner; Standard High Calorie/High Protein  DVT PPx: Lovenox  GI prophylaxis Pepcid    Code status: Full Code    Hospital Problems as of 9/30/2021 Date Reviewed: 9/22/2021        Codes Class Noted - Resolved POA    Leukopenia ICD-10-CM: D72.819  ICD-9-CM: 288.50  9/21/2021 - Present Unknown        Pneumonia due to COVID-19 virus ICD-10-CM: U07.1, J12.82  ICD-9-CM: 480.8, 079.89  9/20/2021 - Present Unknown        * (Principal) Acute hypoxemic respiratory failure due to COVID-19 [facility]) ICD-10-CM: U07.1, J96.01  ICD-9-CM: 518.81, 079.89, 799.02  9/20/2021 - Present Unknown        RESOLVED: COVID-19 ICD-10-CM: U07.1  ICD-9-CM: 079.89  9/20/2021 - 9/29/2021 Unknown        RESOLVED: Acute respiratory failure with hypoxia (HCC) ICD-10-CM: J96.01  ICD-9-CM: 518.81  9/20/2021 - 9/29/2021 Unknown        RESOLVED: Shortness of breath ICD-10-CM: R06.02  ICD-9-CM: 786.05  9/20/2021 - 9/29/2021 Unknown              Objective:     Patient Vitals for the past 24 hrs:   Temp Pulse Resp BP SpO2   09/30/21 1129 98.1 °F (36.7 °C) 87 18 109/74 97 %   09/30/21 0740     96 %   09/30/21 0734 98.8 °F (37.1 °C) 84 21 105/67 96 %   09/30/21 0324 98.5 °F (36.9 °C) 70 20 110/75 97 %   09/30/21 0239     98 %   09/29/21 2235 98.9 °F (37.2 °C) 94 20 107/68 97 %   09/29/21 1957     94 %   09/29/21 1948 99.2 °F (37.3 °C) 81 19 107/70 95 %   09/29/21 1553 98.2 °F (36.8 °C) 85 20 110/76 90 %     Oxygen Therapy  O2 Sat (%): 97 % (09/30/21 1129)  Pulse via Oximetry: 91 beats per minute (09/30/21 0239)  O2 Device: Heated; Hi flow nasal cannula;Humidifier (09/30/21 0740)  Skin Assessment: Clean, dry, & intact (09/28/21 0715)  Skin Protection for O2 Device: Yes (09/27/21 0745)  Orientation: Middle (09/27/21 0745)  Location: Other (Comment) (nasal bridge) (09/27/21 0745)  Interventions: Mouth Care;Reposition Device (09/27/21 1600)  O2 Flow Rate (L/min): 55 l/min (09/30/21 0740)  O2 Temperature: 87.8 °F (31 °C) (09/30/21 0740)  FIO2 (%): 60 % (09/30/21 0740)    Estimated body mass index is 25.68 kg/m² as calculated from the following:    Height as of this encounter: 6' (1.829 m). Weight as of this encounter: 85.9 kg (189 lb 6 oz). Intake/Output Summary (Last 24 hours) at 9/30/2021 1327  Last data filed at 9/30/2021 0605  Gross per 24 hour   Intake    Output 1350 ml   Net -1350 ml         Physical Exam:     General:    Well nourished. Currently needing AirVo 55 L,   Head:  Normocephalic, atraumatic  Eyes:  Sclerae appear normal.  Pupils equally round. ENT:  Nares appear normal, no drainage. Moist oral mucosa  Neck:  No restricted ROM. Trachea midline   CV:   RRR. No m/r/g. No jugular venous distension. Lungs:   Mild end expiratory wheezing, no crackles. Respirations even, unlabored  Abdomen: Bowel sounds present. Soft, nontender, nondistended. Extremities: No cyanosis or clubbing. No edema  Skin:     No rashes and normal coloration. Warm and dry. Neuro:  Cranial nerves II-XII grossly intact. Sensation intact  Psych:  Normal mood and affect. Alert and oriented x3    I have reviewed ordered lab tests and independently visualized imaging below:    Last 24hr Labs:  No results found for this or any previous visit (from the past 24 hour(s)). All Micro Results     None          Other Studies:  XR CHEST SNGL V    Result Date: 9/30/2021  History: Worsening hypoxia Exam: portable chest Comparison: 9/25/2021 Findings: Bilateral lower lobe infiltrates again seen. There is also a right upper lobe infiltrate. No change in the positioning of the right-sided PICC line. No pneumothorax.  No change in the appearance of the mediastinal contour or osseous structures. IMPRESSIONs: Stable exam with bilateral lower lobe infiltrates. There is also a right upper lobe infiltrate. Current Meds:  Current Facility-Administered Medications   Medication Dose Route Frequency    albuterol (PROVENTIL HFA, VENTOLIN HFA, PROAIR HFA) inhaler 2 Puff  2 Puff Inhalation Q6H RT    famotidine (PEPCID) tablet 20 mg  20 mg Oral BID    dexamethasone (DECADRON) 10 mg/mL injection 5 mg  5 mg IntraVENous Q24H    melatonin tablet 5 mg  5 mg Oral QHS    zolpidem (AMBIEN) tablet 5 mg  5 mg Oral QHS PRN    phenol throat spray (CHLORASEPTIC) 1 Spray  1 Spray Oral PRN    HYDROcodone-acetaminophen (NORCO) 7.5-325 mg per tablet 1 Tablet  1 Tablet Oral Q6H    NUTRITIONAL SUPPORT ELECTROLYTE PRN ORDERS   Does Not Apply PRN    central line flush (saline) syringe 10 mL  10 mL InterCATHeter Q8H    LORazepam (ATIVAN) injection 1 mg  1 mg IntraVENous Q6H PRN    sodium chloride (NS) flush 5-40 mL  5-40 mL IntraVENous Q8H    sodium chloride (NS) flush 5-40 mL  5-40 mL IntraVENous Q8H    sodium chloride (NS) flush 5-40 mL  5-40 mL IntraVENous PRN    acetaminophen (TYLENOL) tablet 650 mg  650 mg Oral Q4H PRN    bisacodyL (DULCOLAX) suppository 10 mg  10 mg Rectal DAILY PRN    enoxaparin (LOVENOX) injection 40 mg  40 mg SubCUTAneous Q12H    morphine injection 2 mg  2 mg IntraVENous Q4H PRN       Signed:  Luis Alberto Blake MD    Part of this note may have been written by using a voice dictation software. The note has been proof read but may still contain some grammatical/other typographical errors.

## 2021-09-30 NOTE — PROGRESS NOTES
Comprehensive Nutrition Assessment    Type and Reason for Visit: Reassess  This assessment was completed remotely from within the hospital.     Nutrition Recommendations/Plan:   Meals and Snacks:  Continue current diet. Nutrition Supplement Therapy:   Medical food supplement therapy:  Change Ensure Enlive once per day (this provides 350 kcal and 20 grams protein per bottle)     Malnutrition Assessment:  Malnutrition Status: At risk for malnutrition (specify) (COVID symptoms x 2 weeks PTA, NPO since admission 9/20. Unable to verify weight loss but suspected)    Nutrition Assessment:   Nutrition History: 9/24: Unable to obtain from pt. Pt with COVID symptoms for ~ 2 weesk PTA and thus suspected decreased po intake. NPO since admission d/t respiratory status      Nutrition Background: No known PMH. Presented with 12d h/o Covid-like symptoms, including cough, sinus congestion, headaches, fever and over the last several days PTA had progressive SOB. Tested COVID + 9/10. Admitted with acute respiratory failure with hypoxia and COVID-19 pneumonia  Daily Update:  Unable to contact patient x3. Per provider notes and RN documentation eating well. Likely does not need three ONS per day. Will decreased to lunch only to avoid fatigue. Nutrition Related Findings:   9/24: PPN started, PICC lined placed after 1330. 9/25: PPN changed to CPN (1 L 14%DEX/8%AA). 9/26: TPN rate increased to goal 65/hr/1.56L/d 9/27: Started oral diet and ONS      Current Nutrition Therapies:  ADULT DIET Regular  ADULT ORAL NUTRITION SUPPLEMENT Breakfast, Lunch, Dinner; Standard High Calorie/High Protein    Current Intake:   Average Meal Intake: % Average Supplement Intake: 51-75%      Anthropometric Measures:  Height: 6' (182.9 cm)  Current Body Wt: 85.9 kg (189 lb 6 oz) (9/26), Weight source: Bed scale  BMI: 25.7, Overweight (BMI 25.0-29. 9)  Admission Body Weight: 190 lb 0.6 oz (stated)  Ideal Body Weight (lbs) (Calculated): 178 lbs (81 kg),    Usual Body Wt: 93.9 kg (207 lb) (9/10/2021 as PCP office, 190# at PCP OV 2018), Percent weight change: -8.2          Edema: No data recorded   Estimated Daily Nutrient Needs:  Energy (kcal/day): 2817-6996 (Kcal/kg (20-25), Weight Used:  (86.7 kg bed scale 9/24))  Protein (g/day): 103-172 (1.2-2 grams/kg) Weight Used: (Admission (stated 86.2 kg))  Fluid (ml/day):   (1 ml/kcal)    Nutrition Diagnosis:   · Inadequate oral intake related to impaired respiratory function as evidenced by intake 51-75%    Nutrition Interventions:   Food and/or Nutrient Delivery: Continue current diet, Modify oral nutrition supplement     Coordination of Nutrition Care: Continue to monitor while inpatient    Goals:   Previous Goal Met: Goal(s) achieved  Active Goal: Meet at least 75% est needs by next RD assessment    Nutrition Monitoring and Evaluation:      Food/Nutrient Intake Outcomes: Food and nutrient intake, Supplement intake  Physical Signs/Symptoms Outcomes: Biochemical data, Weight (respiratory status)    Discharge Planning:    Continue oral nutrition supplement    Tequila Archuleta RD, LD  Contact: [phone]      Disaster Mode Active

## 2021-09-30 NOTE — PROGRESS NOTES
Pt remains up in chair. Pt has been up most of the day. Airvo in place at 55 L 65% with sat 96% at this time. Call light in reach, will monitor.

## 2021-09-30 NOTE — PROGRESS NOTES
ACUTE PHYSICAL THERAPY GOALS:  (Developed with and agreed upon by patient and/or caregiver. )  LTG:  (1.)Mr. Apurva Brown will move from supine to sit and sit to supine , scoot up and down and roll side to side in bed with MODIFIED INDEPENDENCE within 7 treatment day(s). (2.)Mr. Apurva Brown will transfer from bed to chair and chair to bed with INDEPENDENCE using the least restrictive device within 7 treatment day(s). (3.)Mr. Apurva Brown will ambulate with INDEPENDENCE for 200 feet with the least restrictive device within 7 treatment day(s). (4.)Mr. Apurva Brown will participate in therapeutic activity/exercises x 25 minutes for increased strength within 7 treatment days. PHYSICAL THERAPY: Daily Note and AM Treatment Day # 3    Roxana Frances is a 42 y.o. male   PRIMARY DIAGNOSIS: Acute hypoxemic respiratory failure due to COVID-19 [facility])  [facility] hypox[facility] respiratory [facility] [U07.1, J96.01]         ASSESSMENT:     REHAB RECOMMENDATIONS: CURRENT LEVEL OF FUNCTION:  (Most Recently Demonstrated)   Recommendation to date pending progress:  Setting:   Outpatient Therapy  Equipment:    None Bed Mobility:   Not tested  Sit to Stand:   Modified Independent  Transfers:   Modified Independent  Gait/Mobility:   Modified Independent     ASSESSMENT:  Mr. Apurva Brown presents in bedside chair on 60L/60% at 94%. He stood and walked about 100 feet total without any rests. He was able to talk some and maintain sats in the low to mid 90's. He then performed below standing exercises well with sats in the mid 90's. Excellent progress and activity tolerance today.          SUBJECTIVE:   Mr. Apurva Brown states, \"good timing\"    SOCIAL HISTORY/ LIVING ENVIRONMENT: see eval  Support Systems: Spouse/Significant Other  OBJECTIVE:     PAIN: VITAL SIGNS: LINES/DRAINS:   Pre Treatment: Pain Screen  Pain Scale 1: FLACC  Pain Intensity 1: 0  Post Treatment: 0   Continuous Pulse Oximetry  O2 Device: Heated, Hi flow nasal cannula, Humidifier     MOBILITY: I Mod I S SBA CGA Min Mod Max Total  NT x2 Comments:   Bed Mobility    Rolling [] [] [] [] [] [] [] [] [] [x] []    Supine to Sit [] [] [] [] [] [] [] [] [] [x] []    Scooting [] [] [] [] [] [] [] [] [] [x] []    Sit to Supine [] [] [] [] [] [] [] [] [] [x] []    Transfers    Sit to Stand [] [x] [] [] [] [] [] [] [] [] []    Bed to Chair [] [] [] [] [] [] [] [] [] [x] []    Stand to Sit [] [x] [] [] [] [] [] [] [] [] []    I=Independent, Mod I=Modified Independent, S=Supervision, SBA=Standby Assistance, CGA=Contact Guard Assistance,   Min=Minimal Assistance, Mod=Moderate Assistance, Max=Maximal Assistance, Total=Total Assistance, NT=Not Tested    BALANCE: Good Fair+ Fair Fair- Poor NT Comments   Sitting Static [x] [] [] [] [] []    Sitting Dynamic [x] [] [] [] [] []              Standing Static [] [x] [] [] [] []    Standing Dynamic [] [x] [] [] [] []      GAIT: I Mod I S SBA CGA Min Mod Max Total  NT x2 Comments:   Level of Assistance [] [x] [] [] [] [] [] [] [] [] []    Distance 100    DME None    Gait Quality slow    Weightbearing  Status N/A     I=Independent, Mod I=Modified Independent, S=Supervision, SBA=Standby Assistance, CGA=Contact Guard Assistance,   Min=Minimal Assistance, Mod=Moderate Assistance, Max=Maximal Assistance, Total=Total Assistance, NT=Not Tested    PLAN:   FREQUENCY/DURATION: PT Plan of Care: 3 times/week for duration of hospital stay or until stated goals are met, whichever comes first.  TREATMENT:     TREATMENT:   ($$ Therapeutic Exercises: 23-37 mins    )  Therapeutic Exercise (28 Minutes): Therapeutic exercises noted below to improve functional activity tolerance.      TREATMENT GRID:   Date:  9/30/21 Date:   Date:     Activity/Exercise Parameters Parameters Parameters   ambulation 100ft     Calf raises 15x B     Standing hip abd 15x B     High knee marching 15x B     squats 10x                       AFTER TREATMENT POSITION/PRECAUTIONS:  Chair, Needs within reach and RN notified    INTERDISCIPLINARY COLLABORATION:  RN/PCT and PT/PTA    TOTAL TREATMENT DURATION:  PT Patient Time In/Time Out  Time In: 1100  Time Out: [address]  PTA

## 2021-09-30 NOTE — PROGRESS NOTES
ACUTE OT GOALS:  (Developed with and agreed upon by patient and/or caregiver.)  1) Patient will complete lower body bathing and dressing with MOD I and adaptive equipment as needed. 2) Patient will complete toileting with MOD I.   3) Patient will complete functional transfers with MOD I and adaptive equipment as needed. 4) Patient will tolerate at least 30 minutes of OT activity with 1-2 rest breaks while maintaining O2 sats >90%. 5) Patient will verbalize at least 3 energy conservation technique to utilize during ADL/IADL. Timeframe: 7 visits       OCCUPATIONAL THERAPY: Daily Note OT Treatment Day # 2    Mellissa Soares is a 42 y.o. male   PRIMARY DIAGNOSIS: Acute hypoxemic respiratory failure due to COVID-19 [facility])  Acute hypoxemic respiratory failure [facility] [U07.1, J96.01]       Reason for Referral:   ICD-10: Treatment Diagnosis: Generalized Muscle Weakness (M62.81)  INPATIENT: Payor: BLUE CROSS / Plan: [place]UE CROSS [facility] / Product Type: PPO /   ASSESSMENT:     REHAB RECOMMENDATIONS:   Recommendation to date pending progress:  [address] [place] [address] Therapy  Equipment:    To Be Determined     PRIOR LEVEL OF FUNCTION:  (Prior to Hospitalization)  INITIAL/CURRENT LEVEL OF FUNCTION:  (Based on most recently demonstrated )   Bathing:   Independent  Dressing:   Independent  Feeding/Grooming:   Independent  Toileting:   Independent  Functional Mobility:   Independent Bathing:   Not tested  Dressing:   Not tested  Feeding/Grooming:   Set Up  Toileting:   Not tested  Functional Mobility:   Not tested     ASSESSMENT:  Mr. Rylee Simpson presents with deficits in overall strength, activity tolerance, ADL performance and functional mobility. Admitted for acute respiratory failure d/t Covid-19; resting on Airvo at 55L 61% with Sp02 > 90%. Pt was sitting EOB. Pt stated he had just been up to toilet and is tired. Pt did not want to walk again to sink.  Pt completed grooming while sitting EOB with set up. Continue POC. SUBJECTIVE:   Mr. Gómez Tracey states, \"I am tired from going to bathroom. \"    SOCIAL HISTORY/LIVING ENVIRONMENT: Lives with spouse and family in a 2-story home with living accommodations on 1st level. 8 steps to enter with L sided hand rail. Independent at baseline for ADLs and functional mobility. 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    Support Systems: Spouse/Significant Other    OBJECTIVE:     PAIN: VITAL SIGNS: LINES/DRAINS:   Pre Treatment: Pain Screen  Pain Scale 1: Numeric (0 - 10)  Pain Intensity 1: 0  Post Treatment: 0   none  O2 Device: Heated, Hi flow nasal cannula       ACTIVITIES OF DAILY LIVING: I Mod I S SBA CGA Min Mod Max Total NT Comments   BASIC ADLs:              Bathing/ Showering [] [] [] [] [] [] [] [] [] [x]    Toileting [] [] [] [] [] [] [] [] [] [x]    Dressing [] [] [] [] [] [] [] [] [] [x]    Feeding [] [] [] [] [] [] [] [] [] [x]    Grooming [] [] [x] [] [] [] [] [] [] [] Oral hygiene and washing face sitting EOB    Personal Device Care [] [] [] [] [] [] [] [] [] [x]    Functional Mobility [] [] [] [] [] [] [] [] [] [x]    I=Independent, Mod I=Modified Independent, S=Supervision, SBA=Standby Assistance, CGA=Contact Guard Assistance,   Min=Minimal Assistance, Mod=Moderate Assistance, Max=Maximal Assistance, Total=Total Assistance, NT=Not Tested    MOBILITY: I Mod I S SBA CGA Min Mod Max Total  NT x2 Comments:   Supine to sit [] [] [] [] [] [] [] [] [] [x] []    Sit to supine [] [] [] [] [] [] [] [] [] [x] []    Sit to stand [] [] [] [] [] [] [] [] [] [x] []    Bed to chair [] [] [] [] [] [] [] [] [] [x] []    I=Independent, Mod I=Modified Independent, S=Supervision, SBA=Standby Assistance, CGA=Contact Guard Assistance,   Min=Minimal Assistance, Mod=Moderate Assistance, Max=Maximal Assistance, Total=Total Assistance, NT=Not Tested    [employer] AM-[employer] 6 Clicks   Daily Activity Inpatient Short Form        How much help from another person does the patient currently need. .. Total A Lot A Little None   1. Putting on and taking off regular lower body clothing? [] 1   [] 2   [x] 3   [] 4   2. Bathing (including washing, rinsing, drying)? [] 1   [] 2   [x] 3   [] 4   3. Toileting, which includes using toilet, bedpan or urinal?   [] 1   [] 2   [x] 3   [] 4   4. Putting on and taking off regular upper body clothing? [] 1   [] 2   [x] 3   [] 4   5. Taking care of personal grooming such as brushing teeth? [] 1   [] 2   [] 3   [x] 4   6. Eating meals? [] 1   [] 2   [] 3   [x] 4   © 2007, Trustees of [facility] [employer], under license to [email or web address]. All rights reserved     Score:  Initial: 20 Most Recent: X (Date: -- )   Interpretation of Tool:  Represents activities that are increasingly more difficult (i.e. Bed mobility, Transfers, Gait). PLAN:   FREQUENCY/DURATION: OT Plan of Care: 3 times/week for duration of hospital stay or until stated goals are met, whichever comes first.    PROBLEM LIST:   (Skilled intervention is medically necessary to address:)  1. Decreased ADL/Functional Activities  2. Decreased Activity Tolerance  3. Decreased Balance  4. Decreased Coordination  5. Decreased Gait Ability  6. Decreased Strength  7. Decreased Transfer Abilities   INTERVENTIONS PLANNED:   (Benefits and precautions of occupational therapy have been discussed with the patient.)  1. Self Care Training  2. Therapeutic Activity  3. Therapeutic Exercise/HEP  4. Neuromuscular Re-education  5. Education     TREATMENT:     TREATMENT:   ($$ Self Care/Home Management: 8-22 mins    )  Self Care (15 Minutes): Self care including Grooming to increase independence and decrease level of assistance required.     TREATMENT GRID:  N/A    AFTER TREATMENT POSITION/PRECAUTIONS:  Needs within reach, RN notified and sitting EOB     INTERDISCIPLINARY COLLABORATION:  RN/PCT, PT/PTA and OT/SPICER    TOTAL TREATMENT DURATION:  OT Patient Time In/Time Out  Time In: 0845  Time Out: 1102 [facility]

## 2021-09-30 NOTE — PROGRESS NOTES
Patient resting quietly in bed. Respirations even and unlabored. On airvo 60L 60% with O2 sat at 95%. No signs of distress. No needs expressed. To give report to oncoming RN.

## 2021-09-30 NOTE — PROGRESS NOTES
[facility] CRITICAL CARE OUTREACH NURSE PROGRESS REPORT      SUBJECTIVE: Called to assess patient secondary to transfer from critical care. MEWS Score: 2 (09/30/21 9414)  Vitals:    09/29/21 2235 09/30/21 0239 09/30/21 0324 09/30/21 0734   BP: 107/68  110/75 105/67   Pulse: 94  70 84   Resp: 20  20 21   Temp: 98.9 °F (37.2 °C)  98.5 °F (36.9 °C) 98.8 °F (37.1 °C)   SpO2: 97% 98% 97% 96%   Weight:       Height:            LAB DATA:    Recent Labs     09/29/21 0426 09/28/21 0233    139   K 4.1 4.0    108*   CO2 28 24   AGAP 7 7   GLU 96 110*   BUN 24* 22   CREA 0.89 0.60*   GFRAA >60 >60   GFRNA >60 >60   CA 9.1 8.3   MG 2.1  --    PHOS 3.9  --         Recent Labs     09/29/21 0426 09/28/21  0233   WBC 4.4 2.7*   HGB 13.8 13.5*   HCT 40.8* 40.1*    293          OBJECTIVE: On arrival to room, I found patient to be alert in bed, watching TV. Pain Assessment  Pain Intensity 1: 0 (09/30/21 0725)  Pain Location 1: Throat  Pain Intervention(s) 1: Medication (see MAR)  Patient Stated Pain Goal: 0                                 ASSESSMENT:  Patient is alert and oriented, sitting upright in bed and watching TV. SpO2 93%, HR 86 on 55L 60% airvo. Reports self proning, working with PT, using incentive spirometer, etc.     PLAN:  48 hour rounding schedule completed, patient remains stable on 60% airvo. Discussed with primary RN, will DC from outreach program per protocol. Outreach RN remains available as needed.

## 2021-09-30 NOTE — PROGRESS NOTES
Pt sitting up in bed. Pt alert oriented times 3 this time. Pt on airvo at 60L 60%. Continuous sat monitor in place with sat 93%. No distress noted at this time. Pt encouraged to call for assistance if needed call light in reach, will monitor.

## 2021-09-30 NOTE — PROGRESS NOTES
Pt sitting up in chair. Pt on airvo at 55 L 65% with sat 93% at this time. Call light in reach, will monitor.

## 2021-10-01 LAB
ANION GAP SERPL CALC-SCNC: 5 MMOL/L (ref 7–16)
BASOPHILS # BLD: 0 K/UL (ref 0–0.2)
BASOPHILS NFR BLD: 1 % (ref 0–2)
BNP SERPL-MCNC: 28 PG/ML (ref 5–125)
BUN SERPL-MCNC: 20 MG/DL (ref 6–23)
CALCIUM SERPL-MCNC: 9.1 MG/DL (ref 8.3–10.4)
CHLORIDE SERPL-SCNC: 101 MMOL/L (ref 98–107)
CO2 SERPL-SCNC: 31 MMOL/L (ref 21–32)
CREAT SERPL-MCNC: 0.86 MG/DL (ref 0.8–1.5)
D DIMER PPP FEU-MCNC: 0.95 UG/ML(FEU)
DIFFERENTIAL METHOD BLD: ABNORMAL
EOSINOPHIL # BLD: 0.2 K/UL (ref 0–0.8)
EOSINOPHIL NFR BLD: 5 % (ref 0.5–7.8)
ERYTHROCYTE [DISTWIDTH] IN BLOOD BY AUTOMATED COUNT: 12.8 % (ref 11.9–14.6)
GLUCOSE SERPL-MCNC: 98 MG/DL (ref 65–100)
HCT VFR BLD AUTO: 41.7 % (ref 41.1–50.3)
HGB BLD-MCNC: 13.9 G/DL (ref 13.6–17.2)
IMM GRANULOCYTES # BLD AUTO: 0.4 K/UL (ref 0–0.5)
IMM GRANULOCYTES NFR BLD AUTO: 8 % (ref 0–5)
LYMPHOCYTES # BLD: 1.7 K/UL (ref 0.5–4.6)
LYMPHOCYTES NFR BLD: 39 % (ref 13–44)
MAGNESIUM SERPL-MCNC: 2.2 MG/DL (ref 1.8–2.4)
MCH RBC QN AUTO: 30.5 PG (ref 26.1–32.9)
MCHC RBC AUTO-ENTMCNC: 33.3 G/DL (ref 31.4–35)
MCV RBC AUTO: 91.4 FL (ref 79.6–97.8)
MONOCYTES # BLD: 0.6 K/UL (ref 0.1–1.3)
MONOCYTES NFR BLD: 14 % (ref 4–12)
NEUTS SEG # BLD: 1.5 K/UL (ref 1.7–8.2)
NEUTS SEG NFR BLD: 33 % (ref 43–78)
NRBC # BLD: 0 K/UL (ref 0–0.2)
PHOSPHATE SERPL-MCNC: 4.2 MG/DL (ref 2.5–4.5)
PLATELET # BLD AUTO: 287 K/UL (ref 150–450)
PLATELET COMMENTS,PCOM: ADEQUATE
PMV BLD AUTO: 10.4 FL (ref 9.4–12.3)
POTASSIUM SERPL-SCNC: 4.4 MMOL/L (ref 3.5–5.1)
PROCALCITONIN SERPL-MCNC: <0.05 NG/ML
RBC # BLD AUTO: 4.56 M/UL (ref 4.23–5.6)
RBC MORPH BLD: ABNORMAL
SODIUM SERPL-SCNC: 137 MMOL/L (ref 138–145)
WBC # BLD AUTO: 4.4 K/UL (ref 4.3–11.1)
WBC MORPH BLD: ABNORMAL

## 2021-10-01 PROCEDURE — 97110 THERAPEUTIC EXERCISES: CPT

## 2021-10-01 PROCEDURE — 74011250637 HC RX REV CODE- 250/637: Performed by: INTERNAL MEDICINE

## 2021-10-01 PROCEDURE — 85379 FIBRIN DEGRADATION QUANT: CPT

## 2021-10-01 PROCEDURE — 65270000029 HC RM PRIVATE

## 2021-10-01 PROCEDURE — 94762 N-INVAS EAR/PLS OXIMTRY CONT: CPT

## 2021-10-01 PROCEDURE — 74011250637 HC RX REV CODE- 250/637: Performed by: EMERGENCY MEDICINE

## 2021-10-01 PROCEDURE — 74011250636 HC RX REV CODE- 250/636: Performed by: HOSPITALIST

## 2021-10-01 PROCEDURE — 99232 SBSQ HOSP IP/OBS MODERATE 35: CPT | Performed by: INTERNAL MEDICINE

## 2021-10-01 PROCEDURE — 83735 ASSAY OF MAGNESIUM: CPT

## 2021-10-01 PROCEDURE — 84100 ASSAY OF PHOSPHORUS: CPT

## 2021-10-01 PROCEDURE — 83880 ASSAY OF NATRIURETIC PEPTIDE: CPT

## 2021-10-01 PROCEDURE — 74011250636 HC RX REV CODE- 250/636: Performed by: INTERNAL MEDICINE

## 2021-10-01 PROCEDURE — 87070 CULTURE OTHR SPECIMN AEROBIC: CPT

## 2021-10-01 PROCEDURE — 77010033711 HC HIGH FLOW OXYGEN

## 2021-10-01 PROCEDURE — 85025 COMPLETE CBC W/AUTO DIFF WBC: CPT

## 2021-10-01 PROCEDURE — 80048 BASIC METABOLIC PNL TOTAL CA: CPT

## 2021-10-01 PROCEDURE — 84145 PROCALCITONIN (PCT): CPT

## 2021-10-01 RX ORDER — ALBUTEROL SULFATE 90 UG/1
2 AEROSOL, METERED RESPIRATORY (INHALATION)
Status: DISCONTINUED | OUTPATIENT
Start: 2021-10-01 | End: 2021-10-04 | Stop reason: HOSPADM

## 2021-10-01 RX ADMIN — Medication 10 ML: at 22:44

## 2021-10-01 RX ADMIN — ENOXAPARIN SODIUM 40 MG: 40 INJECTION SUBCUTANEOUS at 17:10

## 2021-10-01 RX ADMIN — HYDROCODONE BITARTRATE AND ACETAMINOPHEN 1 TABLET: 7.5; 325 TABLET ORAL at 05:48

## 2021-10-01 RX ADMIN — HYDROCODONE BITARTRATE AND ACETAMINOPHEN 1 TABLET: 7.5; 325 TABLET ORAL at 12:35

## 2021-10-01 RX ADMIN — ENOXAPARIN SODIUM 40 MG: 40 INJECTION SUBCUTANEOUS at 05:49

## 2021-10-01 RX ADMIN — FAMOTIDINE 20 MG: 20 TABLET, FILM COATED ORAL at 08:32

## 2021-10-01 RX ADMIN — Medication 5 MG: at 22:44

## 2021-10-01 RX ADMIN — Medication 10 ML: at 13:50

## 2021-10-01 RX ADMIN — HYDROCODONE BITARTRATE AND ACETAMINOPHEN 1 TABLET: 7.5; 325 TABLET ORAL at 17:10

## 2021-10-01 RX ADMIN — Medication 10 ML: at 05:22

## 2021-10-01 RX ADMIN — Medication 10 ML: at 05:23

## 2021-10-01 RX ADMIN — FAMOTIDINE 20 MG: 20 TABLET, FILM COATED ORAL at 17:10

## 2021-10-01 RX ADMIN — DEXAMETHASONE 5 MG: 4 TABLET ORAL at 12:35

## 2021-10-01 RX ADMIN — HYDROCODONE BITARTRATE AND ACETAMINOPHEN 1 TABLET: 7.5; 325 TABLET ORAL at 22:44

## 2021-10-01 RX ADMIN — Medication 10 ML: at 22:43

## 2021-10-01 NOTE — PROGRESS NOTES
Pt sitting up in bed. Pt alert oriented times 3 this time. Pt on airvo at 55L 65%. Continuous sat monitor in place with sat 94%.   No distress noted at this time.  Pt encouraged to call for assistance if needed call light in reach, will monitor

## 2021-10-01 NOTE — PROGRESS NOTES
Hospitalist Progress Note   Admit Date:  2021  1:19 PM   Name:  Amie Roberts   Age:  42 y.o. Sex:  male  :  1980   MRN:  [id number]   Room:  [facility]/    Presenting Complaint: Respiratory Distress    Reason(s) for Admission: Acute hypoxemic respiratory failure due to COVID-19 ([facility] [U07.1, J96.01]     Hospital Course & Interval History: This is a 39-year-old male with no prior significant past medical history presented to the ER with worsening shortness of breath for almost 2 weeks prior to presentation. He tested positive for Covid on 9/10/2021. When EMS arrived, they were unable to  his oxygen saturation at home and was placed on 15 L oxygen with improvement of sats in the 80s. Chest x-ray reveals bilateral atypical pneumonia. CRP on admission 9.1. Patient is not vaccinated for COVID-19. He was initially admitted to the ICU on BiPAP and subsequently transitioned to AirVo. He was transferred to medical floor on . He received Tocilizumab on 2021. He is out of the window for remdesivir as symptoms have been present for more than 2 weeks prior to admission. He received dexamethasone currently on 5 mg daily. After patient was transferred to medical floor hospitalist was consulted to assume care. Subjective (10/01/21): Patient is still needing AirVo 50 L to maintain sats greater than 90%. No fever no chills. No chest pain. No nausea no vomiting. Assessment & Plan:     Principal Problem:    Acute hypoxemic respiratory failure POA due to COVID-19 ([facility] (2021)  COVID-19 pneumonia POA  Pt is still needing AirVo 50 L to maintain sats greater than 88%. Intensivist on board. Appreciate recs. Incentive spirometry. Continue dexamethasone. Not a candidate for remdesivir as symptoms have been present for more than 2 weeks prior to admission. Patient received Tocilizumab on 2021. Albuterol as needed.     Leukopenia  Improved      Dispo/Discharge Planning: Anticipate inpatient hospital stay for at least 3 to 5 days until oxygen requirements improved. Patient's wife updated regarding current plan of care today. Diet:  ADULT DIET Regular  ADULT ORAL NUTRITION SUPPLEMENT Lunch; Standard High Calorie/High Protein  DVT PPx: Lovenox  GI prophylaxis Pepcid    Code status: Full Code    Hospital Problems as of 10/1/2021 Date Reviewed: 9/22/2021        Codes Class Noted - Resolved POA    Leukopenia ICD-10-CM: D72.819  ICD-9-CM: 288.50  9/21/2021 - Present Unknown        Pneumonia due to COVID-19 virus ICD-10-CM: U07.1, J12.82  ICD-9-CM: 480.8, 079.89  9/20/2021 - Present Unknown        * (Principal) Acute hypoxemic respiratory failure due to COVID-19 [facility]) ICD-10-CM: U07.1, J96.01  ICD-9-CM: 518.81, 079.89, 799.02  9/20/2021 - Present Unknown        RESOLVED: COVID-19 ICD-10-CM: U07.1  ICD-9-CM: 079.89  9/20/2021 - 9/29/2021 Unknown        RESOLVED: Acute respiratory failure with hypoxia (HCC) ICD-10-CM: J96.01  ICD-9-CM: 518.81  9/20/2021 - 9/29/2021 Unknown        RESOLVED: Shortness of breath ICD-10-CM: R06.02  ICD-9-CM: 786.05  9/20/2021 - 9/29/2021 Unknown              Objective:     Patient Vitals for the past 24 hrs:   Temp Pulse Resp BP SpO2   10/01/21 1556 98.2 °F (36.8 °C) 87 20 117/79 90 %   10/01/21 1126 98 °F (36.7 °C) 75 20 118/87 92 %   10/01/21 1017     92 %   10/01/21 1016     95 %   10/01/21 0753 98 °F (36.7 °C) 78 20 113/85 94 %   10/01/21 0310 97.5 °F (36.4 °C) 74 16 (!) 122/92 99 %   09/30/21 2300 98.7 °F (37.1 °C) 86 16 117/77 96 %   09/30/21 2001 97.7 °F (36.5 °C) 98 18 127/83 97 %   09/30/21 1639     94 %     Oxygen Therapy  O2 Sat (%): 90 % (10/01/21 1556)  Pulse via Oximetry: 94 beats per minute (10/01/21 1016)  O2 Device: Heated; Hi flow nasal cannula (10/01/21 1236)  Skin Assessment: Clean, dry, & intact (09/28/21 0715)  Skin Protection for O2 Device: Yes (09/27/21 0745)  Orientation: Middle (09/27/21 0745)  Location: Other (Comment) (nasal bridge) (09/27/21 0745)  Interventions: Mouth Care;Reposition Device (09/27/21 1600)  O2 Flow Rate (L/min): 50 l/min (10/01/21 1236)  O2 Temperature: 87.8 °F (31 °C) (10/01/21 1016)  FIO2 (%): 50 % (10/01/21 1236)    Estimated body mass index is 25.68 kg/m² as calculated from the following:    Height as of this encounter: 6' (1.829 m). Weight as of this encounter: 85.9 kg (189 lb 6 oz). Intake/Output Summary (Last 24 hours) at 10/1/2021 1612  Last data filed at 10/1/2021 1236  Gross per 24 hour   Intake 240 ml   Output 2050 ml   Net -1810 ml         Physical Exam:     General:    Well nourished. Currently needing AirVo 50 L,   Head:  Normocephalic, atraumatic  Eyes:  Sclerae appear normal.  Pupils equally round. ENT:  Nares appear normal, no drainage. Moist oral mucosa  Neck:  No restricted ROM. Trachea midline   CV:   RRR. No m/r/g. No jugular venous distension. Lungs:   Mild end expiratory wheezing, no crackles. Respirations even, unlabored  Abdomen: Bowel sounds present. Soft, nontender, nondistended. Extremities: No cyanosis or clubbing. No edema  Skin:     No rashes and normal coloration. Warm and dry. Neuro:  Cranial nerves II-XII grossly intact. Sensation intact  Psych:  Normal mood and affect.   Alert and oriented x3    I have reviewed ordered lab tests and independently visualized imaging below:    Last 24hr Labs:  Recent Results (from the past 24 hour(s))   MAGNESIUM    Collection Time: 10/01/21  6:07 AM   Result Value Ref Range    Magnesium 2.2 1.8 - 2.4 mg/dL   PHOSPHORUS    Collection Time: 10/01/21  6:07 AM   Result Value Ref Range    Phosphorus 4.2 2.5 - 4.5 MG/DL   METABOLIC PANEL, BASIC    Collection Time: 10/01/21  6:07 AM   Result Value Ref Range    Sodium 137 (L) 138 - 145 mmol/L    Potassium 4.4 3.5 - 5.1 mmol/L    Chloride 101 98 - 107 mmol/L    CO2 31 21 - 32 mmol/L    Anion gap 5 (L) 7 - 16 mmol/L    Glucose 98 65 - 100 mg/dL    BUN 20 6 - 23 MG/DL Creatinine 0.86 0.8 - 1.5 MG/DL    GFR est AA >60 >60 ml/min/1.73m2    GFR est non-AA >60 >60 ml/min/1.73m2    Calcium 9.1 8.3 - 10.4 MG/DL   CBC WITH AUTOMATED DIFF    Collection Time: 10/01/21  6:07 AM   Result Value Ref Range    WBC 4.4 4.3 - 11.1 K/uL    RBC 4.56 4.23 - 5.6 M/uL    HGB 13.9 13.6 - 17.2 g/dL    HCT 41.7 41.1 - 50.3 %    MCV 91.4 79.6 - 97.8 FL    MCH 30.5 26.1 - 32.9 PG    MCHC 33.3 31.4 - 35.0 g/dL    RDW 12.8 11.9 - 14.6 %    PLATELET 893 220 - 450 K/uL    MPV 10.4 9.4 - 12.3 FL    ABSOLUTE NRBC 0.00 0.0 - 0.2 K/uL    NEUTROPHILS 33 (L) 43 - 78 %    LYMPHOCYTES 39 13 - 44 %    MONOCYTES 14 (H) 4.0 - 12.0 %    EOSINOPHILS 5 0.5 - 7.8 %    BASOPHILS 1 0.0 - 2.0 %    IMMATURE GRANULOCYTES 8 (H) 0.0 - 5.0 %    ABS. NEUTROPHILS 1.5 (L) 1.7 - 8.2 K/UL    ABS. LYMPHOCYTES 1.7 0.5 - 4.6 K/UL    ABS. MONOCYTES 0.6 0.1 - 1.3 K/UL    ABS. EOSINOPHILS 0.2 0.0 - 0.8 K/UL    ABS. BASOPHILS 0.0 0.0 - 0.2 K/UL    ABS. IMM. GRANS. 0.4 0.0 - 0.5 K/UL    RBC COMMENTS NORMOCYTIC/NORMOCHROMIC      WBC COMMENTS OCCASIONAL      PLATELET COMMENTS ADEQUATE      DF AUTOMATED     D DIMER    Collection Time: 10/01/21  2:22 PM   Result Value Ref Range    D DIMER 0.95 (H) <0.56 ug/ml(FEU)   NT-PRO BNP    Collection Time: 10/01/21  2:22 PM   Result Value Ref Range    NT pro-BNP 28 5 - 125 PG/ML       All Micro Results     Procedure Component Value Units Date/Time    CULTURE, RESPIRATORY/SPUTUM/BRONCH Ivana Donaldson [[id number]] Collected: 10/01/21 1010    Order Status: Completed Specimen: Sputum Updated: 10/01/21 1226          Other Studies:  No results found.     Current Meds:  Current Facility-Administered Medications   Medication Dose Route Frequency    albuterol (PROVENTIL HFA, VENTOLIN HFA, PROAIR HFA) inhaler 2 Puff  2 Puff Inhalation Q4H PRN    dexAMETHasone (DECADRON) tablet 5 mg  5 mg Oral DAILY    famotidine (PEPCID) tablet 20 mg  20 mg Oral BID    melatonin tablet 5 mg  5 mg Oral QHS    zolpidem (AMBIEN) tablet 5 mg  5 mg Oral QHS PRN    phenol throat spray (CHLORASEPTIC) 1 Spray  1 Spray Oral PRN    HYDROcodone-acetaminophen (NORCO) 7.5-325 mg per tablet 1 Tablet  1 Tablet Oral Q6H    NUTRITIONAL SUPPORT ELECTROLYTE PRN ORDERS   Does Not Apply PRN    central line flush (saline) syringe 10 mL  10 mL InterCATHeter Q8H    LORazepam (ATIVAN) injection 1 mg  1 mg IntraVENous Q6H PRN    sodium chloride (NS) flush 5-40 mL  5-40 mL IntraVENous Q8H    sodium chloride (NS) flush 5-40 mL  5-40 mL IntraVENous Q8H    sodium chloride (NS) flush 5-40 mL  5-40 mL IntraVENous PRN    acetaminophen (TYLENOL) tablet 650 mg  650 mg Oral Q4H PRN    bisacodyL (DULCOLAX) suppository 10 mg  10 mg Rectal DAILY PRN    enoxaparin (LOVENOX) injection 40 mg  40 mg SubCUTAneous Q12H    morphine injection 2 mg  2 mg IntraVENous Q4H PRN       Signed:  Luis Alberto Blake MD    Part of this note may have been written by using a voice dictation software. The note has been proof read but may still contain some grammatical/other typographical errors.

## 2021-10-01 NOTE — PROGRESS NOTES
Pt remains up in chair. Pt has been up most of the day. Airvo in place at 40 L 40% with sat 96% at this time. Call light in reach, will monitor.

## 2021-10-01 NOTE — PROGRESS NOTES
10/01/21 1016   Oxygen Therapy   O2 Sat (%) 95 %   Pulse via Oximetry 94 beats per minute   O2 Device Heated; Hi flow nasal cannula   O2 Flow Rate (L/min) 50 l/min   O2 Temperature 87.8 °F (31 °C)   FIO2 (%) 60 %  (weaned to 50%)

## 2021-10-01 NOTE — PROGRESS NOTES
HONEY CM:  Patient on 46/65 Airvo today. Patient will be discharge home with [facility] when medically stable. Case Management will continue to follow.

## 2021-10-01 NOTE — RT PROTOCOL NOTE
Respiratory Care Services     Policy Number: [id number]    Title: Oxygen Protocol    Effective Date: 01/1996    Revised Date: 06/2013, 02/29/2016, 4/2018, 7/2019    Reviewed Date: 05/2014, 03/2015, 06/2017, 11/2020        I. Policy: The Oxygen Protocol will be initiated for all patients upon written order from physician for administration of oxygen therapy or if a patient is found to have an oxygen saturation of 88% or less. Special consideration: the goal of oxygen therapy for COPD patients is to maintain oxygen saturation between 88% - 92% to comply with GOLD Guidelines. II. Purpose: To provide protocol driven respiratory therapy for the administration of oxygen at concentrations greater than that in ambient air with the intent of treating or preventing the symptoms and manifestations of hypoxia. III. Responsibility: Director Respiratory Care Services, all Respiratory Care Practitioners     IV. Indications:   A. Implement this protocol for patients when physician orders oxygen to be administered or when patient is found to have an oxygen saturation of 88% or less. B. To assure routine monitoring of patient's oxygen saturation b.i.d. and to make appropriate adjustments in accordance with ordered oxygen saturation parameters. C. To assure continuity of respiratory care that meets [facility] Clinical Practice Guidelines and GOLD Guidelines. D. Hb < 8  E. Sickle Cell anemia crisis    V. Assessment:  Assess the following parameters to determine the need to adjust oxygen:  A. Measurement of patient's oxygen saturation via pulse oximetry. B. Observation of patient's color, respiratory effort, and responsiveness. C. Measurement of heart rate and respiratory rate. D. Complete a three-step ambulatory oxygen saturation when ordered. VI. Initiation:  Upon receipt of an order for oxygen, the RCP will:   A.  Verify order in the patient's EMR, which should include the desired oxygen saturation to be maintained. B. The patient shall be placed on oxygen with humidity (except for those oxygen delivery devices that do not require humidity, i.e. venturi masks and non-rebreather masks) as ordered by the physician to achieve the prescribed oxygen saturation. C. In the event that no saturation is specified, a saturation of 90% will be maintained. D. Patients, who are found to have a SaO2 of 88% or less, may be started on supplemental oxygen as described above. E. Patients admitted with cardiac problems/disease shall be maintained at 92% per Chest Committee recommendation. F. The patient will be informed of the \"no smoking policy\" and instructed in the proper use of oxygen therapy. G. Once desired oxygen saturation has been achieved, the RCP will document FIO2 and oxygen saturation in the respiratory section of the patient's EMR. VII. Maintenance:   A. 30-second oxygen saturation check will be taken to maintain the saturation ordered by the physician each day. B. Patients will be assessed each shift and as needed by pulse oximetry to determine if oxygen needs to be decreased, increased or discontinued. C. If changes in FIO2 are indicated, all changes will be documented in the respiratory section of the patient's EMR. D. If no changes in FIO2 are required, the patient's oxygen flow rate and saturation will be recorded in the respiratory section of the patient's EMR. E. Per [facility], patients who are receiving oxygen therapy but are not on oxygen at home, should be weaned off oxygen as soon as possible or when anticipated discharge becomes evident. [place] will be discontinued after oxygen saturation has been maintained for 24 hours on room air and documented in the patient's EMR. G. Patients on the Inpatient Rehabilitation area on 9th floor will be exempt from having their oxygen discontinued per protocol.  Oxygen may be weaned but will be changed to prn to meet the needs of the patient when exercising and participating in therapy. H. The goal of oxygen therapy is to maintain patients with a diagnosis of COPD at oxygen saturation between 88% - 92% to comply with GOLD Guidelines. VIII. Safety: RCP will address the following safety issues:  A. Identify patient using the two patient identifiers name and birth date via ID bracelet. B. Perform hand hygiene per hospital policy utilizing Standard Precautions for all patients and following transmission-based isolation as indicated per hospital policy. C. Cardiac patients will be maintained at an oxygen saturation of 92%. D. If a patient's FIO2 requirements necessitate changing oxygen delivery devices to a high concentration of oxygen, documentation indicating the change must be included in the progress notes, as well as in the respiratory flowsheet. E. If a patient has a hemoglobin level <8 mg. RCP will consult physician before discontinuing oxygen. IX. Interventions:   A. RCP will assess patient for signs of respiratory distress or suspicion of CO2 retention. B. An ABG may be obtained for patients exhibiting respiratory distress or sickle cell crisis. C. An order should be entered into patient's EMR for ABG under per protocol. X. Documentation  A. Document assessment findings in the respiratory section of the patient's EMR. B. Document changes in therapy per protocol in the respiratory orders section and in the care plan section of the patient's EMR. C. Document patient education in the patient education section of the patient's EMR. XI. Reportable Conditions:  Report to the physician immediately:  A. Acute changes in patient's respiratory status. B. An oxygen saturation <85%. C. A change in oxygen delivery device to provide a high concentration of oxygen. XII. Patient Instructions: Review with Patient  A. Purpose of oxygen therapy  B. Proper technique for using oxygen  C.  No smoking policy    Approval: Pulmonary Committee (1-25-96)  Revision: Chest Committee (4-28-05)    L - Respiratory Care Department Policy, Procedure and Protocol Guideline Manual, 1995,         MAYRA Graham. L - Therapist Driven Respiratory Care Protocols  A Practitioner's Guide for Criteria-Based       Respiratory Care by Meidna Finch M.D., and MAYRA Corrales RRT. L - The rationale for therapist-driven protocols: an update. Respiratory Care 1998. N  [facility] Clinical Practice Guidelines. Respiratory Care Services       Policy Number: [id number]    Title: Patient Care Assessment Protocol    Effective Date: 01/1999    Revised Date: 05/2014, 04/2018, 12/2018, 07/2019    Reviewed Date: 06/2013/ 03/2015, 03/2016, 06/2017, 11/2020        Overview  In an effort to improve quality and reduce costs of respiratory care at [facility], the Respiratory Department has developed a number of Patient Care Protocols. These protocols have been developed according to [place] 3 and are utilized for those patients who are ordered respiratory therapy using therapeutic indications and standardized approaches for accomplishing objectives. Patient Care Protocols are intended to improve care by:   Defining the indications and standards of care agreed upon by the Pulmonary Medicine and [address] [place] [address] of [facility].  Training respiratory care practitioners to apply those criteria to individual patients and modify therapy as indicated by the protocols.  Documenting the indication and care plan as part of the initial ordering process.  Tapering or discontinuing treatments once the indication for therapy changes. The Patient Care Protocols shall be universally applied throughout the hospital as determined by   the Pulmonary Medicine and [address] [place] [address] Ave.     Rationale for Patient Care Assessment Protocols:   Continuous Quality Improvement   Cost containment   Standardization of care   Enhanced continuity of care   Utilization review   Timely intervention    The following patient care assessment protocols have been developed:   Aerosolized Medication Protocol    Bronchial Hygiene Protocol    Oxygen Protocol   CVRU Fast Track Weaning Protocol    Asthma Treatment Protocol ER   Pediatric Asthma Treatment Protocol ER   Alpha-1 Antitrypsin Deficiency Protocol   Prone Positioning Protocol    COPD Protocol    Home Oxygen Assessment Protocol   Ventilator Weaning Protocol    Lung Volume Expansion Protocol    The Director of Melody Ospina oversees the Patient Care Assessment Program. The Respiratory Educator is responsible for protocol development and training. The Supervisor is responsible for implementation and  activities. Each patient with an order for respiratory treatments will receive an evaluation. Respiratory Care Practitioners (RCP's) will perform the evaluations. The same evaluation tool will be utilized for initial and follow-up assessments. If the patient does not meet criteria for ordered therapy, the therapy will be discontinued. If the patient demonstrates an adverse response to initially ordered therapy, the therapy will be discontinued and the physician will be contacted. Specific physician's orders that deviate from protocols and are deemed \"inappropriate\" or \"unsafe\" will be addressed with ordering physician and/or medical director as required. Respiratory Patient Care Assessment Protocols    I. Policy:   In an effort to provide quality patient care and effective utilization of services, physicians who order respiratory therapy will have their patients treated via the protocols established (see attached) Respiratory Care Practitioners (RCP's) will complete the initial assessment which will indicate patient needs,  the care plan developed and will performed within 24 hours of admission. Frequency of the therapy will be set according to the results of the respiratory therapy evaluation and frequency guidelines policy. Reassessment will be continued every 48 hours and more frequently as needed for the individual patient. II. Purpose:     F. To provide a process that will allow for ongoing assessment and care plan modification for patients receiving respiratory services based on both objective and or subjective patient responses to interventions. This process of protocol utilization will assist in patient care progression while eliminating the need for the physician to continually update respiratory therapy orders. G. To assure continuity of respiratory care that meets [facility] Clinical Practice Guidelines. III. Initiation:  Implement Respiratory Care Protocols for patients who are ordered by physician          to receive respiratory therapy procedures or for ventilator management. IV. Protocol:  E. Upon receiving an order for therapy the RCP will review the patient's EMR (electronic medical record) for all pertinent information includin. Physician's order for therapy  2. Patient history and physical examination  3. Physician progress notes  4. Diagnostic. X-rays, PFT's, arterial blood gases etc.  F. The RCP will perform a respiratory assessment in the following manner:  1. General observations: color, pattern and effort of breathing, chest expansion, (symmetrical and bilateral), level of consciousness and the ability to ambulate. 2. The RCP will assess patient's cough ability and determine if bronchial hygiene is needed. If patient is unable to produce sputum, at that time, the RCP should question the patient with regard to their sputum: production, color consistency, frequency and amount.   3. Auscultation: Using a stethoscope, the RCP will listen and note quality of breath sounds and presence or absence of adventitious breath sounds in all lung fields, both anteriorly and posteriorly. 4. Upon completing the EMR review and physical assessment, the RCP will document findings in the RT Assessment section of the EMR. The score level will be provided and will be used to determine the frequency of therapy. V. Indications:   A. Bronchial Hygiene Protocol indications:   1. Potential for or presence of atelectasis. 1. Need for hydration and removal of retained secretions. 1. Need for improvement of cough effectiveness. 1. Presence of conditions associated with disorder of pulmonary clearance:  a. Cystic fibrosis  b. Bronchiectasis  c. Neuromuscular disease  d. Obstructive lung diseases  e. Restrictive lung diseases   Aerosolized Medication(s) Protocol indications:Treatment of bronchospasm/wheezing  2. Improvement of mucociliary clearance  3. Treatment of stridor  4. History of Bronchiectasis, Asthma or COPD  C. Oxygen Therapy Protocol indications:   1. Documented hypoxemia  2. Severe trauma  3. Acute myocardial infarction  4. Short-term therapy (e.g. post anesthesia recovery)  D. CVRU Ventilator Weaning Protocol indications:  1. All mechanically ventilated surgical patients unless they have a no wean order. E. Asthma Treatment Protocol ER indications:  1. Patients 15years of age and older that have been triaged or diagnosed with   asthma exacerbation shall be indicated for the ER Asthma Treatment Protocol. A physician order will be required to initiate the protocol. F. Pediatric Asthma protocol in the ER indications:  1. Patients less than 15years old that have been triaged or diagnosed with asthma exacerbation shall be indicated for the Pediatric Asthma protocol. A physician order will be required to initiate the protocol. G. Alpha-1 Antitrypsin Deficiency Testing protocol indications:         1. Patients admitted and diagnosed with COPD. H. Prone Positioning Protocol indications:         1. Acute lung injury         2. Acute respiratory distress syndrome (ARDS)   I. Respiratory Care COPD Protocol indications:         1. History of COPD in patient's records         2. Smoking history   J. Home Oxygen Assessment Protocol indications:         1. Chronic lung disease         2. Cor pulmonale         3. Unable to wean to room air 48 hours prior to anticipated discharge. K.  Ventilator Weaning Protocol indications:         1. Patient's mechanically ventilated          2. Managed by intensivist  L. Lung Volume Expansion Protocol indications:        1. Any patient at risk for pulmonary complications. VI. Maintenance:    H. Timely patient assessment is an integral part of this protocol therefore the following will be applied:  1. All non- critical care patients will be evaluated upon receiving initial respiratory care orders within 24 hours and re-evaluated within 48 hours (or more as needed). 2. Orders requesting a Respiratory Consult will be responded to in the following manner:  a. In patient emergency situations, the RCP assigned to the floor will respond immediately to the patient, provide an initial respiratory assessment, and contact the patient's physician as necessary for appropriate orders. b. In non-emergent situations, the RCP assigned to the floor will respond to the patient within 90 minutes and provide an initial respiratory assessment and contact patient's physician as necessary for appropriate orders. c. An RCP will provide a comprehensive assessment as soon as possible. 3. Upon completion of an evaluation, the RCP will complete documentation in the patient's EMR in the RT Assessment section. 4. The RCP who completes the assessment will document orders for therapy in the orders section of the patient's EMR selecting new order. Next, per protocol should be selected indicating it is a protocol order and sign orders should be selected to complete the process.  The applicable protocol must be added to the progress note per Joint Commission guidelines. 5. The Pharmacy and Therapeutics (P&T) Committee has mandated that the medication Xopenex may be changed to unit dose albuterol without an order, except for those patients receiving Xopenex due to cardiac arrhythmias. 1. The dosage for these patients should be 0.63 mg. and may be changed from 1.25 mg. to 0.63 mg per P & T Committee by the RCP completing the assessment. 6. Patients who are not experiencing cardiac arrhythmias, and are ordered Xopenex and Atrovent may be changed to Duoneb. VII. Safety:        I. The following safety issues shall be monitored:  1. The RCP will perform hand hygiene per hospital policy utilizing Standard Precautions for all patients and following transmission-based isolation as indicated per hospital policy. 2. The RCP must exercise professional judgment in classifying the patient for frequency of therapy. 3. Appropriate classification of the patient will require an evaluation utilizing the Therapy Assessment Protocol Guidelines. 4. The RCP will confer with the physician concerning the care of the patient at any time questions or problems arise. 5. If during therapy, the patient exhibits no improvement, or deterioration in clinical status the RCP will notify the physician and the patient's nurse. VIII. Interventions:   F. The patient's nurse is responsible concerning all items related to his/her care. Ongoing communication with nursing is essential to successful protocol management. G. The RCP recognizes the value of the team approach in meeting the patient's needs. Nursing input regarding the patient's pulmonary condition will be sought as needed. IX. Reportable conditions: The RCP will inform the physician if:  1. There are acute changes in patient's respiratory status. 2. The therapist is unable to determine appropriate care plan upon assessment. 3. The patient fails to reach therapeutic objective.   4. A change or additional medication is needed. X.  Patient Education:    D. Patient will receive instruction on the followin. The treatment modality, including objectives and proper technique of therapy  2. Respiratory medications  E. Documentation shall occur in the patient education section of the patient's EMR. XI. Documentation: Record all findings as described above in the patient's EMR. Related Protocols: A. Aerosolized Medication Protocol  B. Bronchial Hygiene   C. Oxygen Protocol   D. [facility] Fast Track Weaning Protocol  E. Asthma Treatment protocol ER  F. Alpha-1 antitrypsin Deficiency Protocol  G. Prone Positioning Protocol  H. Respiratory Care COPD Protocol  I. Home Oxygen assessment Protocol  J. Ventilator Weaning Protocols   K. Volume Expansion protocol    Indications      Frequency          Level  A. Aerosol therapy   1.  q4h     Severe SOB, wheezing, unable to sleep 1   2.  qid, q4 wa or q6h   Moderate SOB, wheezing   2   3.  tid      Hx of asthma, or COPD mild wheezing,         or facilitate secretion removal              3   4.  bid      Asthma, or COPD, Intermittent wheezing 4   5. PRN, i.e. tid PRN, qid PRN Asthma, or COPD, occasional wheezing 5       B. Bronchopulmonary Hygiene    1. q4h             Copious secretions, SOB, unable to sleep 1   2. qid & PRN            Moderate amounts of secretions   2   3. tid           Small amounts of secretions and poor cough,               history of secretions    3    4. PRN, i.e. tid PRN, qid PRN     Breathing exercises, encourage cough only 4      C. Oxygen Therapy     Follow hospital approved Oxygen Protocol      Note:  qid treatments are due 0800, 1200, 1600, and 2000. tid treatments are due 0800, 1400, and 2000  Q6h treatments are due 0800, 1400, 2000, 0200  Q4 wa teatments are due 0800, 1200, 1600, and 2000. Q4h treatments are due  0800, 1200, 1600, 2000, 0000, and 0400.         The Level 1-5 rating system is only to be used as criteria for determining appropriate frequency of therapy. References:   N   Joint Commission Consolidated [place] Standard   L    Respiratory Care Department Policy, Procedure and Protocol Guideline Manual, 1995, MAYRA RASHID  Therapist Driven Respiratory Care Protocols  A Practitioner's Guide for Criteria-Based Respiratory Care by Jose Cm M.D., and CHRISTY Andrea  The rationale for therapist-driven protocols: an update. Respiratory Care 1998; 63:972-621   L Therapist Driven Respiratory Care Protocols  A Practitioner's Guide for Criteria-Based Respiratory Care by Jose Cm M.D., and CHRISTY Andrea The rationale for therapist-driven protocols: an update. Respiratory Care 1998; [id number]. N   [facility] Clinical Practice Guidelines. D. The RCP will perform a respiratory assessment in the following manner:  1. Perform hand hygiene per hospital policy utilizing Standard Precautions for all patients and following transmission-based isolation as indicated per policy. 2. Identify patient via ID bracelet verifying patient name and birth date. 3. General observations: color, pattern and effort of breathing, chest expansion, (symmetrical and bilateral), level of consciousness and the ability to ambulate. 4. The RCP will assess patients cough ability and determine if Nasotracheal suctioning is needed. If patient is unable to produce sputum, at that time, the RCP should question the patient with regard to their sputum: production, color consistency, frequency and amount. 5. Auscultation: Using a stethoscope, the RCP will listen and note quality of breath sounds and presence or absence of adventitious breath sounds in all lung fields, both anteriorly and posteriorly. 6. Upon completing the EMR review and physical assessment, the RCP will document findings in the RT Assessment section of the EMR. The score level will be provided and will be used to determine the frequency of therapy. V. Indications:   A.   Indications for Bronchial Hygiene Protocol will include:  1. Potential for or presence of atelectasis. 2. Need for hydration and removal of retained secretions. 3. Need for improvement of cough effectiveness. 4. Presence of conditions associated with disorder of pulmonary clearance:  a. Cystic fibrosis  b. Bronchiectasis  B. Indications for Aerosolized Medication(s) Protocol should include:  1. Treatment of bronchospasm/wheezing  2. Improvement of mucociliary clearance  3. Treatment of stridor  4. History of Asthma or COPD             C.  Indications for Oxygen Therapy Protocol should include:  1. Documented hypoxemia  2. Severe trauma  3. Acute myocardial infarction  4. Short-term therapy (e.g. post anesthesia recovery)    VI. Maintenance:    D. Timely patient assessment is an integral part of this protocol therefore the following will be applied:  1. All non- critical care patients will be evaluated upon receiving initial respiratory care orders within 24 hours and re-evaluated within 48 hours (or more as needed). 2. Orders requesting a Respiratory Consult will be responded to in the following manner:  a. In patient emergency situations, the RCP assigned to the floor will respond immediately to the patient, provide an initial respiratory assessment, and contact the patients physician as necessary for appropriate orders. b. In non-emergent situations, the RCP assigned to the floor will respond to the patient within 90 minutes and provide an initial respiratory assessment and contact patients physician as necessary for appropriate orders. c. An RCP will provide a comprehensive assessment as soon as possible. 3. Upon completion of an evaluation, the RCP will complete documentation in the patients EMR in the RT Assessment section. 4. The RCP who completes the assessment will document orders for therapy in the orders section of the patients EMR selecting new order.  Next, per protocol should be selected indicating it is a protocol order and sign orders should be selected to complete the process. 5. The Pharmacy and Therapeutics (P&T) Committee has mandated that the medication Xopenex may be changed to unit dose albuterol without an order, except for those patients receiving Xopenex due to cardiac arrhythmias. The dosage for these patients should be 0.63 mg. and may be changed from 1.25 mg. to 0.63 mg per P & T Committee by the RCP completing the assessment. 6. Patients who are not experiencing cardiac arrhythmias, and are ordered Xopenex and Atrovent may be changed to Duoneb. VII. Safety:        A. The following safety issues shall be monitored:  1. The RCP will perform hand hygiene per hospital policy utilizing Standard Precautions for all patients and following transmission-based isolation as indicated per hospital policy. 2. The RCP must exercise professional judgment in classifying the patient for frequency of therapy. 3. Appropriate classification of the patient will require an evaluation utilizing the Therapy Assessment Protocol Guidelines. 4. The RCP will confer with the physician concerning the care of the patient at any time questions or problems arise. 5. If during therapy, the patient exhibits no improvement or deterioration in clinical status the RCP will notify the physician and the patients nurse. VIII. Interventions:   A. The patients nurse is responsible concerning all items related to his/her care. Ongoing communication with nursing is essential to successful protocol management. B. The RCP recognizes the value of the team approach in meeting the patients needs. Nursing input regarding the patients pulmonary condition will be sought as needed. IX. Reportable conditions: The RCP will inform the physician if:  1. There are acute changes in patients respiratory status. 2. The therapist is unable to determine appropriate care plan upon assessment.   3. The patient fails to reach therapeutic objective. 4. A change or additional medication is needed. X.  Patient Education:    A. Patient will receive instruction on the followin. The treatment modality, including objectives and proper technique of therapy  2. Respiratory medications  B. Documentation shall occur in the patient education section of the patients EMR. XI. Documentation: Record all findings as described above in the patients EMR. Related Protocols: A. Aerosolized Medication Protocol  B. Bronchial Hygiene  C. Oxygen Protocol   D. Volume Expansion/Secretion Clearance  E. Ventilator Weaning Protocols    References:  N   Joint Commission Consolidated [place] Standard   L    Respiratory Care Department Policy, Procedure and Protocol Guideline Manual, , MAYRA Graham. L  Therapist Driven Respiratory Care Protocols  A Practitioners Guide for Criteria-Based Respiratory Care by Adolph Lopez M.D., and MAYRA Henley RRT. L  The rationale for therapist-driven protocols: an update. Respiratory Care 1998; [facility] Guidelines. Respiratory Care Services Policy Number: [id number]    Title: Aerosolized Medication Protocol    Effective Date: 10/1998    Revised Date: , , ,      Reviewed Date: / 03/15 , , , 2020   I. Policy: The Aerosolized Medication Protocol shall by implemented by Respiratory Care Practitioners (RCP) for patients with orders to receive aerosol therapy with medication. II. Purpose: To open and maintain obstructed airways, the RCP, will utilize the following   protocol to select the indicated aerosolized medication(s) and determine the most effective method of delivery to the patient. III. Patient Type: All patients who are determined to meet aerosolized medication criteria as          outlined in this protocol. IV.  Responsibility: Director, [address], registered Respiratory Care Practitioners (RCP's) with documented competency in the performance of                                     respiratory therapeutic techniques. V. Equipment needed:  H. Stethoscope  I. Pulse oximeter  J. AeroEclipse nebulizer  K. Meter Dose Inhaler (MDI)    VI. Protocol:   G. The following conditions are accepted indications for aerosolized medication therapy. 5. Bronchospasm/wheezing  6. Impaired mucociliary clearance  7. Tracheobronchial mucosal congestion/and laryngeal stridor  8. Diseases which commonly require aerosolized medication therapy include, but are not limited to:  f. Asthma/reactive airway disease  g. Bronchitis/emphysema (COPD)  h. Cystic fibrosis  i. Severe laryngitis/tracheitis  j. Bronchiectasis  k. Smoke inhalation or chemical trauma to the lung or upper airway  l. Physical trauma to the upper airway  m. Laryngotracheobronchitis  n. Bronchiolitis  o. Non-specific wheezing              B. Indications for bronchodilator medications will include:  d. Bronchospasm/ wheezing  e. Asthma/reactive airway disease  f. Chronic obstructive pulmonary disease  g. Obstructive defect on pulmonary function testing  C. Administration of medications  5. If a bronchodilator or any other type of respiratory medication is needed, a physician order must be indicated in the medication section in the patient's EMR. 6. When the physician specifies the medication and dosage at the time of request, the ordered medication will be used as part of the care plan. D. The following guidelines will be utilized in the evaluation and selection of the appropriate delivery device for indicated medication(s):  1. MDI is the preferred method of medication delivery via common canister. a. Patient should be alert/cooperative  b. Able to perform 3 second breath hold. c. Patient may be changed to self-administer as appropriate. d. Patients in isolation will be provided an individual inhaler.   2. Unassisted aerosol (UA) is indicated if  c. Ventilation is inadequate  d. Patient is unable to perform MDI appropriately     VII. Guidelines:   Monitor patient's vital signs and evaluate patient's clinical status. The need to change medication and/or modality may be indicated by:  5. A pulse greater than 120 bpm, or if a pulse increase of 20 bpm occurs with bronchodilator medications. 6. Significant worsening of dyspnea or wheezing occurring during or within 30 minutes of discontinuing therapy. 7. Worsening of patient's sensorium (e.g. patient becomes confused or obtunded, and unable to follow directions). 8. Worsening of patient's chest x-ray. 9. Change in sputum (e.g. increased pulmonary infiltrate, which might indicate need for volume expansion therapy). 10. Patient has difficulty coughing up secretions, which might indicate need for acetylcysteine and/or bronchial hygiene therapy. 11. Call physician immediately if dyspnea worsens and is not responsive to modifications allowed by protocol. VIII. Clinical Responsibility:  I. The therapy assessment guidelines will be used to evaluate all patients receiving aerosolized medications with the exception of critical care areas. 2. RCP's will perform changes in therapy per protocol. 2. It will be the responsibility of RCP to provide instruction regarding respiratory medications, possible side effects, aerosol therapy and proper MDI technique, as well as, spacer usage to patients ordered MDI therapy. 2. Current therapy that is part of a patient's home regimen will not be discontinued. a. Provide spacer and educate patient on proper inhaler technique if needed. IX. Documentation  D. Document assessment findings in the respiratory assessment section of the patient's EMR. E. Document changes in therapy per protocol in the respiratory orders section and in the care plan section of the patient's EMR. F. Document patient education in the patient education section of the patient's EMR.   X. Outcome Criteria:  J. Relief of wheezes and obstruction  K. Improved cough and sputum color and consistency  L. Improved chest x-ray  M. Improved arterial oxygen tension and or SaO2  N. Improved Peak Flow on asthmatic patients        XI. Related Policy/Protocols:  H. Respiratory Patient Care Protocols  I. Bronchial Hygiene Therapy  J. Oxygen Protocol  K. [address] Administration of Metered Dose Inhalers via a Common Canister  L. [address] [place] [address] Generating Procedures  Reference:  L - Respiratory Care Department Policy, Procedure and Protocol Guideline Manual, 1995, MAYRA Graham. L -  Therapist Driven Respiratory Care Protocols  A Practitioner's Guide for Criteria-Based Respiratory Care by Juan Daniel Tracy M.D., and MAYRA Kelly, MARYJANE. L - The rationale for therapist-driven protocols: an update. Respiratory Care 1998; [id number]. N -[facility] Clinical Practice Guidelines. Respiratory Care Services     Policy Number: [id number]    Title: Bronchial Hygiene Protocol    Effective Date: 01/1999    Revised Date: 12/2014, 11/2017, 7/2019    Reviewed Date: 06/2013, 05/2014, 03/2015, 03/2016, 06/2017, 4/2018, 11/2020     I. Purpose: The Respiratory Care Practitioner (RCP) will utilize the following protocol to select and initiate bronchial hygiene therapy to open and maintain obstructed airways when indicated. II. Patients: All patients who are ordered bronchial hygiene therapy. III. Clinical Area: All general patient floors     IV. Protocol: The following conditions or diseases are indications for bronchial hygiene                           therapy. L. Oscillating PEP Therapy        Indications should include:   9. Atelectasis caused by mucus plugging or foreign body  10. Chronic mucociliary clearance disorders  11. Retained secretions which may be associated with the following conditions:  p. Bronchitis  q. Bronchiectasis  r. Pneumonia  M.  PAP- Positive airway pressure therapy  Indications include:  7. Patients with post-operative atelectasis or to prevent post operative atelectasis. 8. Patients who cannot perform deep breathing exercises due to pain. 9. Patients requiring lung expansion therapy who cannot follow instructions. 10. Patients requiring lung expansion therapy with poor inspiratory capacity <10cc/kg. 11. Patients requiring aerosol therapy in conjunction with opening their airways. N. Vibratory / Acoustical Airway Clearance Therapy (ACT)- i.e. (Vibralung, Vest, or Percussor)  Indications should include  12. Patient conditions  that involve retained secretions, increased mucus production and defective mucociliary clearance such as:  h. Cystic fibrosis  i. Chronic bronchitis  j. Bronchiectasis  k. Pneumonia  l. Asthma  m. Muscular dystrophy  n. Post-operative atelectasis  o. Neuromuscular respiratory impairments  p. ACT may be considered in patients with COPD with  symptomatic secretion retention, guided by patient preference,  toleration, and effectiveness of therapy Emani Lorraine et al., 2013). O. Nasotracheal suctioning indications should include:  5. Inability to cough effectively  6. Excessive secretions  7. Artificial airway      V. Equipment:   A. PEP therapy device   B. Vest therapy equipment   Etienne Lowe   D. AccuPap   Popeye Pollen NT suction equipment       VI. Guidelines:   Monitor patient's vital signs and evaluate patient's clinical status. The need to                                change therapy modality may be indicated by:  Danyelle Villeda in patient's sensorium (patient now confused or obtunded, and unable to follow directions). I. A significant deterioration is evident on patient's chest radiograph or increased sputum production. J. Increased thickening of secretions (e.g. mucolytic therapy may be indicated.)  K. Development of wheezing  L. Decrease in oxygen saturation  M. Development of chest pain. VII.    Clinical Responsibility: The Therapy Assessment Protocol guidelines will be used to                re-evaluate all patients on bronchial hygiene therapy (See Therapy Assessment Protocol). J. RCP's will perform changes in therapy according to protocol. Swapnil [place]. Bronchial hygiene therapy may be discontinued when goals of therapy are met, e.g., secretions easily expectorated for 48 hours, atelectasis is resolved, etc.  L. PAP Therapy may be utilized in place of IPPB therapy per discretion of the RCP, as approved by the Pulmonary Medicine and [facility]. VIII. Outcome Criteria:  Outcome criteria for bronchial hygiene therapy should include:  O. Decrease in sputum production  P. Improved breath sounds  Q. Improved arterial oxygen tension and/or SaO2  R. Improved chest X-ray  S. Subjective response to therapy    IX. Documentation  G. Document assessment findings in the respiratory assessment section of the patient's EMR. H. Document changes in therapy per protocol in the respiratory orders section and in the care plan section of the patient's EMR. I. Document patient education in the patient education section of the patient's EMR. X. Related Protocols:  3. Respiratory Patient Care Assessment Protocols  3. Aerosolized Medication Protocol  3. Oxygen Therapy Protocol        Reference:    L - Respiratory Care Department Policy, Procedure and Protocol Guideline Manual, 1995, MAYRA Graham. L - Therapist Driven Respiratory Care Protocols  A Practitioner's Guide for Criteria-Based         Respiratory Care by Renee Joseph M.D., and MAYRA Dior, MARYJANE. N  [facility] Clinical Practice Guidelines. Naveen Burk., Michael Ogden., Gamal Mane., Macie Anderson., Elly Walter., . . . GENIE Luo (2013, December). [facility] Clinical Practice Guideline: Effectiveness of Nonpharmacologic Airway Clearance Therapies in Hospitalized Patients. Respiratory Care, 58(12), 6881-5191.  Retrieved June 28, 2019          Respiratory Care Services     Policy Number: [id number]    Title: Noninvasive Positive Pressure Ventilation, (NIPPV)    Effective Date: 2005, 2017    Revised Date: 2012, 2014, 2015, 2016, 2018, 2019    Reviewed: 2019, 2020   I. Description: Non Invasive Ventilation (NIV) is a ventilatory assist technique used as an alternative to endotracheal intubation. NIV is pressure ventilation delivered as BIPAP® (Bi-level Positive Airway Pressure) which is a low pressure electronically driven device intended for use as a ventilatory support system for patients who have an intact respiratory drive. The device provides non-invasive ventilatory assistance through the use of a nasal or full face mask. Bi-Level  (two levels of ventilatory support) known as inspired positive airway pressure (IPAP) and  positive airway support (EPAP). One level of support can also be delivered which is delivered as EPAP or CPAP which is delivered throughout the respiratory cycle. The aim is to maintain adequate ventilation and minimize the effort of breathing. The BREAS Vivo 50, BIPAP® Vision System and the Respironics V60 are the systems available for non-invasive ventilation. These devices are also capable of being used for invasive ventilation in the critical care units. BIPAP Vision: This device uses an electronic pressure control sensing monitor pressure differential in the patient circuit. This feedback allows for adjustment of the flow and pressure output  to assist in inhalation or exhalation through the administration at two distinct levels of positive pressure. During inspiration, the level is variably positive and is always higher than the expiratory level. During exhalation, pressure is variably positive or near ambient. In addition, this device has the ability to compensate for leaks through automatic adjustment of the trigger threshold.  This capability allows for the application of BIPAP®  for mask-applied ventilation assistance. Respironics V60  The Respironics V60 uses Auto-Trak technology to help ensure patient synchrony and therapy acceptance and is designed to address the specific challenges of NIV. By providing auto-adaptive leak compensation, inspiratory triggering, and expiratory cycling, Auto-Trak delivers optimal synchrony in the face of dynamic leak and changing patient demand. The Respironics V60 is designed to include pediatric use and is equipped with several modes, which allows the practitioner to meet the specific needs of the patients. See Appendix 1 for definitions of settings. Carefx Vivo 50   The Vivo 50 ventilator (with or without the SpO2 and CO2 sensor) is intended to provide continuous or intermittent ventilatory support for the care of individuals who require mechanical ventilation. The Vivo 50 with the SpO2 sensor is intended to measure functional oxygen saturation of arterial hemoglobin (%SpO2) and pulse rate. The Vivo 50 with the CO2 sensor is intended to measure CO2 in the inspiratory and expiratory gas. The device is intended to be used in home, institution, hospitals and portable applications such as wheelchairs and gurneys. It may be used for both invasive and non-invasive ventilation. Suitable for a wide range of pathologies and for patients with changing requirements over time. Multiple circuits: dual limb with exhaled volume measurements, single limb with exhalation valve or leakage port. Multiple modes  Pressure and Volume Modes with Target Volume and SIMV  II. Policy: The administration of non-invasive positive pressure ventilation (NPPV) will be the responsibility of the Respiratory Care Practitioner (RCP). Upon receipt of a physician order, proper patient instruction, set up and monitoring will be provided to ensure patient understanding, compliance and proper utilization of prescribed therapy.   P. A patient may be allowed to use their own NPPV machine after having their equipment checked and approved by [facility]. Q. A consent and Release for Home Ventilator form must be signed by the patient and witnessed by a health care provider if the patient chooses to use his home ventilator. R. A patient that is being treated for acute respiratory failure and placed on non-invasive ventilation must be placed in a critical care unit, per Medical Director. D.  A patient who is being treated for sleep apnea may be treated on the floors. E.   A patient has the option of using a hospital owned NPPV unit. F.   A patient may use a hospital unit and their own mask if they choose. G. Patients may be placed on full face mask with NPPV units on the hospital floors under the following conditions:  1. Patient has an end stage disease process. 2. Patient was placed on full face mask and NPPV unit in the Critical Care Units and it has been established as safe and effective therapy. 3. Patient is ordered full face mask with NPPV unit for home use. 4. In the event patient is to be set up or transitioned to home on a NPPV unit, the Respironics V60 (in the AVAPS mode) shall be utilized while the patient is in the hospital. If a Darnell Richardson is unavailable, a home NPPV unit may be provided by the DME provider for no more than 48 hours. III. Responsibility: Director, Melody Ospina, and all Respiratory Care Practitioners with documented competency. IV. Indications for non-invasive ventilation:  N. Acute respiratory failure (Patient must be in Critical Care Unit)  O. Chronic respiratory failure  P. Alveolar hypoventilation  Q. Documented sleep apnea  V. Contraindications:  A. Patients with severe respiratory failure without a spontaneous respiratory drive  B. Noninvasive ventilation may be contraindicated for patients with the followin. Inability to maintain a patent airway or adequately clear secretions  2. Acute sinusitis or otitis media  3.  Risk for aspiration of gastric contents  4. Hypotension  5. Pre-existing pneumothorax or pneumomediastinum  6. Epistaxis  7. Recent facial, oral or skull surgery or trauma  8. history of allergy or sensitivity to mask materials where the risk from allergic reaction outweighs the benefit of ventilatory assistance  C. Potential Complications:  1. Cardiovascular compromise  2. Skin breakdown and discomfort from mask  3. Gastric distention  4. Increased intracranial pressure  5. Pulmonary barotraumas    VI. Mask fit  M. It is essential that the patient be correctly fitted with an appropriate size mask. 12. Choose mask according to sizing template on disposable setups. 13. The mask should fit comfortably on the patient's nose, not occluding the nares and the base of the mask should fit comfortably between the chin and bottom lip see picture on setup guide. 14. Headgear should fit comfortably not tight. The bottom edge of the headgear should sit at the base of the nape of the neck with side straps underneath the earlobes. 15. The face masks are better for patients who are dyspneic and tachypneic as they tend to mouth breathe with a nasal mask and reduce the effectiveness of the ventilation. 12. Masks that are too tight are uncomfortable and may cause pressure areas. Masks that are too loose leak which reduce the efficiency of the system. 17. When using a nasal mask the patient must keep their mouth closed to obtain the desired effect of the ventilation. 18. May place an Allevyn Gentle Border dressing over bridge of nose to avoid skin breakdown. VII. Procedure for Non-invasive ventilation via Vivo 50:   Refer to 's recommendations. VIII. Procedure for non-invasive ventilation using the V60 or BiPAP ® Vision:            Refer to [facility] recommendations. IX. Monitoring:  T. While in use, the RCP should check the patient and non-invasive unit no less than every four hours. U.  Failure of NIV should be suspected if:   4. The patient is unable to maintain adequate oxygenation, decreasing 02 saturations despite increases in 02.  5. There is a reduction in neurological or conscious state. 6. The patient has excessive secretions or increasing respiratory rate. 7. Failure of PaCO2 or PH to improve on ABG sample. 8. The patient has poorly compliant lungs. X. Weaning               A. Weaning or cessation of non-invasive ventilation should occur under the following                        conditions:  12. PaC02 returns to normal and patient maintains O2 saturations with minimal                oxygen. 13. Respiratory rate is returned within normal limits. 14. Patient is unable to tolerate non-invasive ventilation. 15. Patient's dyspnea is reduced. 16. Patient exhibits normal overnight ventilation. 17. Patient is receiving palliative treatment. XI. Documentation:  N. Documentation should include the followin. Ventilator settings comply with physician order. 14. Ventilator is functioning properly as evidenced by a check of measured volumes, rates, pressures and FIO2. 15. Alarms are set appropriately. 16. Measured inspired gas temperature (invasive mode only)  17. Vital signs (pulse, respiratory rate, oxygen saturation, breath sounds)  18. Patient tolerance to therapy. 19. Manual resuscitator and appropriate size mask at patient bedside      REFERENCES  Respironics V60 user Manual .. \. Giulia Nicholson \Equipment\[id number].pdf    VIVO 50 clinical Guide . Giulia Nicholson \. Pamalee Bucker \Equipment\VIVO 50 [id number]. pdf     [address] [place] and Respiratory Care Section. KYLER Raya 2001. Introducing non-invasive positive pressure ventilation. Nursing Standard. 15,26, 42-45. Rebeca Jones. 2003. Using non-invasive ventilation in acute wards: part 2. Nursing Standard. 18,1, 41-44. [place] 47.   Use of continuous positive airway pressure (CPAP) in the critically ill-physiological principles. Critical Care 12 ([phone]     VISHAL Feldman2002. Bi-level positive airway pressure (BiPAP) and acute cardiogenicpulmonary edema   ( ACPO) in the emergency department. Critical Care 15 (2):51-63        DEFINITIONS AND USUAL SETTINGS                                                            APPENDIX 1                      Settings   Settings in  Active   CPAP Settings in  Active   BiPAP Description Range Usual Setting  Used in NIV         CPAP Mode                     Continuous Positive Airway  Pressure   4-24 cmH20 8-14     ST or  BiPAP MODE                           Spontaneous Timed or BiLevel Positive Airway Pressure Ventilation. Two level system of alternating during non- invasive ventilation in sync with breathing-set IPAP and EPAP      __     __   AVAPS Mode    (only available  on V60)          The volume-targeted AVAPS (average volume-assured pressure support) mode combines the attributes of pressure-controlled and volume-targeted ventilation. __     __       EPAP                                       Positive Airway Pressure. Recruits under ventilated alveoli to remain open during expiration by providing a constant pressure throughout resp. cycle.  Must be less than or equal to IPAP    4-25 cmH20 5-14                 Settings Settings in Active CPAP Settings in Active BiPAP Description Range Usual Setting Used in NIV     IPAP                           Inspired Positive Airway Pressure provides pressure throughout the inspiratory phase to support patient ventilation  4-40 cmH20 10-20               I-time                    Inspiratory time- time taken to inspire in seconds  0.3  3.0 sec 1:3   FIO2                   Oxygen delivered  21- 100% As ordered         Ramp time                                      The Ramp Time function helps your patient adapt to ventilation by gradually increasing inspiratory and expiratory pressure over a set interval (minutes). This time gradually delivers pressures so to reduce patient anxiety and increases comfort. Off  or  5-45 minutes 10 minutes                   Rate (RR)          Patient's respiratory rate 4- 60 BPM 4     Rise time          Speed at which the inspiratory pressure rises to the set pressure. 1-5  (1 is the fastest) Set at 3         Patient Data  Data Description Range Usual setting in NIV   Breath phase/trigger Indicator  Bar in left hand corner. Colored according to breath trigger. n/a n/a   PIP Positive Inspiratory pressure  0-50 n/a   Patient total leak Est. or unintentional leak  0-200 n/a   Patient trigger Patient triggered breaths as a percentage  0-100% Should be 100%   Respiratory rate Respiratory rate 0-90 n/a   Ti/Ttot Inspiratory duty cycle or inspiratory time divided by total cycle time  0-91% n/a   Minute volume Est. minute ventilation.  TV x rate=MV  0-99 LPM n/a   Tidal volume Est.  tidal volume  0-3000 ml n/a     Alarms  Alarm Description Range Set  at   [facility] Rate High respiratory rate 5-90 10 breaths above patient's breath rate   Low Rate Low respiratory rate alarm 1-89 BPM 5 breaths below patient's breath rate   Hi VT High tidal volume alarm 200-2500 ml 200 ml above patient's own   Low VT Low tidal volume alarm OFF  1500 ml OFF   HIP High Inspiratory pressure alarm 5-50 cm H20 10 cm above IPAP   LIP Low inspiratory pressure alarm OFF, 1-40 cmH20 OFF   Lo VE Low minute vent alarm OFF, 0.1 to 99L/min OFF   LIP T Low inspiratory delay time 5-60 seconds 5 seconds       CPAP Settings BiPAP Settings     Set CPAP level as ordered Set breath rate as ordered     Set FIO2 as ordered Set I-time as 1.3     Set Ramp time as ordered Set EPAP as ordered     Set C-Flex at 3 Set IPAP as ordered      Set Rise time as ordered      Set FIO2 as ordered           Respiratory Care Services  Consent and Release for Home Ventilator      Patient Name: _________________________________________ Room: ___________    SSN: _______________________________           Account Number:  __________________    Use and care of my personal home ventilator, (invasive or non-invasive) mechanical life support device while at [facility] system has been discussed with me by my physician and I have been provided with an opportunity to ask questions which have been answered to my satisfaction. I also understand a respiratory care practitioner is not expected to remain in my room or general vicinity at all times. It is understood that every precaution consistent with the best medical practice will be taken and I give Respiratory Care Services permission to monitor my ventilator during my hospital stay. I hereby release [employer] system, it's agents, employees and medical staff from liability arising from any and all claims, demands, losses and damages to person and/or property as a result of the above mentioned requests. I certify that I have read and understand this consent agreement and release and that I am legally qualified to allie this authorization. ___________________  Date    _____________________________________        ________________________  Signature of Patient or Parent (of minor patient,                  Relationship (if other than Patient)  halfway parent) if applicable. Closest Relative,  Guardian or legal Representative    _____________________________________  Witness  Legal representative is defined as person named by the court as a guardian, executor or , or having power of  specifically set forth to authorize this action.     [phone] (3/02, 7/14[facility]   Consent and Release for Home Ventilator

## 2021-10-01 NOTE — PROGRESS NOTES
BSR received  Patient A/Ox4 resting in recliner listening to phone  RR even/unlabored on heated high flow 40L/40%  NAD noted at this time  Patient instructed to call for assistance when needed with understanding verbalized

## 2021-10-01 NOTE — PROGRESS NOTES
ACUTE PHYSICAL THERAPY GOALS:  (Developed with and agreed upon by patient and/or caregiver. )  LTG:  (1.)Mr. Alfredo Nuñez will move from supine to sit and sit to supine , scoot up and down and roll side to side in bed with MODIFIED INDEPENDENCE within 7 treatment day(s). (2.)Mr. Alfredo Nuñez will transfer from bed to chair and chair to bed with INDEPENDENCE using the least restrictive device within 7 treatment day(s). (3.)Mr. Alfredo Nuñez will ambulate with INDEPENDENCE for 200 feet with the least restrictive device within 7 treatment day(s). (4.)Mr. Alfredo Nuñez will participate in therapeutic activity/exercises x 25 minutes for increased strength within 7 treatment days. PHYSICAL THERAPY: Daily Note and AM Treatment Day # 4    Pauline Titus is a 42 y.o. male   PRIMARY DIAGNOSIS: Acute hypoxemic respiratory failure due to COVID-19 [facility])  [facility] [U07.1, J96.01]         ASSESSMENT:     REHAB RECOMMENDATIONS: CURRENT LEVEL OF FUNCTION:  (Most Recently Demonstrated)   Recommendation to date pending progress:  Setting:   Outpatient Therapy  Equipment:    None Bed Mobility:   Not tested  Sit to Stand:   Modified Independent  Transfers:   Modified Independent  Gait/Mobility:   Modified Independent     ASSESSMENT:  Mr. Alfredo Nuñez presents in bedside chair on 50L/50% at 94%. He stood and performed standing exercises below for 15 minutes straight without rest and with good sats throughout. Patient is walking and exercises throughout the day as I had suggested. Will likely see one more time hopefully when off airvo.   Continued good progress         SUBJECTIVE:   Mr. Alfredo Nuñez states, \"good timing\"    SOCIAL HISTORY/ LIVING ENVIRONMENT: see eval  Support Systems: Spouse/Significant Other  OBJECTIVE:     PAIN: VITAL SIGNS: LINES/DRAINS:   Pre Treatment: Pain Screen  Pain Scale 1: FLACC  Pain Intensity 1: 0  Post Treatment: 0 Continuous Pulse Oximetry  O2 Device: Heated, Hi flow nasal cannula     MOBILITY: I Mod I S SBA CGA Min Mod Max Total  NT x2 Comments:   Bed Mobility    Rolling [] [] [] [] [] [] [] [] [] [x] []    Supine to Sit [] [] [] [] [] [] [] [] [] [x] []    Scooting [] [] [] [] [] [] [] [] [] [x] []    Sit to Supine [] [] [] [] [] [] [] [] [] [x] []    Transfers    Sit to Stand [] [x] [] [] [] [] [] [] [] [] []    Bed to Chair [] [] [] [] [] [] [] [] [] [x] []    Stand to Sit [] [x] [] [] [] [] [] [] [] [] []    I=Independent, Mod I=Modified Independent, S=Supervision, SBA=Standby Assistance, CGA=Contact Guard Assistance,   Min=Minimal Assistance, Mod=Moderate Assistance, Max=Maximal Assistance, Total=Total Assistance, NT=Not Tested    BALANCE: Good Fair+ Fair Fair- Poor NT Comments   Sitting Static [x] [] [] [] [] []    Sitting Dynamic [x] [] [] [] [] []              Standing Static [] [x] [] [] [] []    Standing Dynamic [] [x] [] [] [] []      GAIT: I Mod I S SBA CGA Min Mod Max Total  NT x2 Comments:   Level of Assistance [] [x] [] [] [] [] [] [] [] [] []    Distance 10    DME None    Gait Quality slow    Weightbearing  Status N/A     I=Independent, Mod I=Modified Independent, S=Supervision, SBA=Standby Assistance, CGA=Contact Guard Assistance,   Min=Minimal Assistance, Mod=Moderate Assistance, Max=Maximal Assistance, Total=Total Assistance, NT=Not Tested    PLAN:   FREQUENCY/DURATION: PT Plan of Care: 3 times/week for duration of hospital stay or until stated goals are met, whichever comes first.  TREATMENT:     TREATMENT:   ($$ Therapeutic Exercises: 8-22 mins    )  Therapeutic Exercise (15 Minutes): Therapeutic exercises noted below to improve functional activity tolerance, strength and mobility.      TREATMENT GRID:   Date:  9/30/21 Date:  10/1/21 Date:     Activity/Exercise Parameters Parameters Parameters   ambulation 100ft     Calf raises 15x B 20x B    Standing hip abd 15x B 20x B    High knee marching 15x B 20x B squats 10x 15x B    Standing hip ext  20x B    Standing HS curls  20x B    HS curls with \"raise the roof\" arm lifts  10x B          AFTER TREATMENT POSITION/PRECAUTIONS:  Chair, Needs within reach and RN notified    INTERDISCIPLINARY COLLABORATION:  RN/PCT and PT/PTA    TOTAL TREATMENT DURATION:  PT Patient Time In/Time Out  Time In: 1100  Time Out: 1115    Sandy Peacock PTA

## 2021-10-01 NOTE — PROGRESS NOTES
BSR received  Patient A/Ox4 resting in recliner  RR even/unlabored on heated high flow 55L/65%  NAD noted at this time  Patient denies needs/pain  Instructed to call for assistance when needed with understanding verbalized

## 2021-10-01 NOTE — PROGRESS NOTES
[address] [facility], [address]  Admission Date: 9/20/2021         Daily Progress Note: 10/1/2021    The patient's chart is reviewed and the patient is discussed with the staff. Background: Patient is A 44 y.o.  male seen and evaluated at the request of Dr. Randolph Hammans for dyspnea and COVID 19.  No medical history. The patient presented to the ED with c/o worsening shortness of breath for almost two weeks.  The patient tested positive for COVID at [facility] 9/10/2021.  The patient c/o sinus congestion and cough.  EMS was unable to  O2 sat at home and patient was placed on 15L O2 with improvement of O2 sats in the 80s, no fever noted on arrival to ED [facility] reveals bilateral atypical pneumonia, CRP 9.1, ABG reveals pH 7.43, CO2 37.1, O2 87, HCO3 24.6.      The pt is unvaccinated. He has had fever and difficulty taking deep breaths. He reports some pain with breathing but no hemoptysis. He has not had any purulent sputum.      Date of COVID-19 symptom onset: 9/6/21  COVID-19 Meds:  Dexamethasone 10mg daily: 9/24/21-9/28; now 5mg 9/29/21-  Remdesivir: out of window  Actemra: 9/20/21  Monoclonal Abs: none  Vaccination:    None    No results found for requested labs within last 30 days. Subjective:   Pt up in chair. States slept better last night. Still coughing up some sputum, but mentions he has always had issues with sputum production. Feels like some tightness still in chest.  Using IS appropriately. Eating and drinking well.     Current Facility-Administered Medications   Medication Dose Route Frequency    albuterol (PROVENTIL HFA, VENTOLIN HFA, PROAIR HFA) inhaler 2 Puff  2 Puff Inhalation Q4H PRN    famotidine (PEPCID) tablet 20 mg  20 mg Oral BID    dexamethasone (DECADRON) 10 mg/mL injection 5 mg  5 mg IntraVENous Q24H    melatonin tablet 5 mg  5 mg Oral QHS    zolpidem (AMBIEN) tablet 5 mg  5 mg Oral QHS PRN    phenol throat spray (CHLORASEPTIC) 1 Spray  1 Spray Oral PRN    HYDROcodone-acetaminophen (NORCO) 7.5-325 mg per tablet 1 Tablet  1 Tablet Oral Q6H    NUTRITIONAL SUPPORT ELECTROLYTE PRN ORDERS   Does Not Apply PRN    central line flush (saline) syringe 10 mL  10 mL InterCATHeter Q8H    LORazepam (ATIVAN) injection 1 mg  1 mg IntraVENous Q6H PRN    sodium chloride (NS) flush 5-40 mL  5-40 mL IntraVENous Q8H    sodium chloride (NS) flush 5-40 mL  5-40 mL IntraVENous Q8H    sodium chloride (NS) flush 5-40 mL  5-40 mL IntraVENous PRN    acetaminophen (TYLENOL) tablet 650 mg  650 mg Oral Q4H PRN    bisacodyL (DULCOLAX) suppository 10 mg  10 mg Rectal DAILY PRN    enoxaparin (LOVENOX) injection 40 mg  40 mg SubCUTAneous Q12H    morphine injection 2 mg  2 mg IntraVENous Q4H PRN     Review of Systems  +cough, SOB  Constitutional: negative for fever, chills, sweats  Cardiovascular: negative for chest pain, palpitations, syncope, edema  Gastrointestinal:  negative for dysphagia, reflux, vomiting, diarrhea, abdominal pain, or melena  Neurologic:  negative for focal weakness, numbness, headache  Objective:     Vitals:    09/30/21 2001 09/30/21 2300 10/01/21 0310 10/01/21 0753   BP: 127/83 117/77 (!) 122/92 113/85   Pulse: 98 86 74 78   Resp: 18 16 16 20   Temp: 97.7 °F (36.5 °C) 98.7 °F (37.1 °C) 97.5 °F (36.4 °C) 98 °F (36.7 °C)   SpO2: 97% 96% 99% 94%   Weight:       Height:           Intake/Output Summary (Last 24 hours) at 10/1/2021 0756  Last data filed at 10/1/2021 0310  Gross per 24 hour   Intake    Output 950 ml   Net -950 ml     Physical Exam:   Constitution:  the patient is well developed and in no acute distress  HEENT:  Sclera clear, pupils equal, oral mucosa moist  Respiratory: intermittent wheeze, diminished in bases  Cardiovascular:  RRR without M,G,R  Gastrointestinal: soft and non-tender; with positive bowel sounds. Musculoskeletal: warm without cyanosis. There is no lower extremity edema.   Skin:  no jaundice or rashes, no wounds   Neurologic: no gross neuro deficits     Psychiatric:  alert and oriented x 4    CXR:   9/30/21 9/25/21      LAB:  Recent Labs     10/01/21  0607 09/29/21  0426   WBC 4.4 4.4   HGB 13.9 13.8   HCT 41.7 40.8*    281     Recent Labs     10/01/21  0607 09/29/21  0426   * 138   K 4.4 4.1    103   CO2 31 28   GLU 98 96   BUN 20 24*   CREA 0.86 0.89   MG 2.2 2.1   CA 9.1 9.1   PHOS 4.2 3.9     No results for input(s): LAC, TROPHS, BNPNT, CRP in the last 72 hours. No lab exists for component: ESR  No results for input(s): PH, PCO2, PO2, HCO3, PHI, PCO2I, PO2I, HCO3I in the last 72 hours. No results for input(s): SDES, CULT in the last 72 hours. Assessment and Plan:  (Medical Decision Making)   Principal Problem:    Acute hypoxemic respiratory failure due to COVID-19 ([facility] (9/20/2021)   --increased O2 yesterday and overall, CXR with more infiltrates. Check BNP, procal and ddimer; Has not been on antibiotics. Add sputum cultures  --wean o2 as tolerated to keep sats >92%; weaned to 50L, 60%  --using IS appropriately; encouraged ongoing use. --continue decadron  --continue albuterol inhaler for wheezing, chest tightness    Active Problems:    Pneumonia due to COVID-19 virus (9/20/2021)   --sputum cx  --add procal      Leukopenia (9/21/2021)   --improved. Immature granuolocytes noted, monitor for now given COVID infection      More than 50% of the time documented was spent in face-to-face contact with the patient and in the care of the patient on the floor/unit where the patient is located. Monty Kwon, SONA         I have spoken with and examined the patient. I agree with the above assessment and plan as documented.     Gen: awake in a chair  Lungs:  Decreased BS  Heart:  RRR with no Murmur/Rubs/Gallops  Abd:soft, NT  Ext: no LE edema    Down to 60 % Fi02, continue to wean    Glenetta Copas, MD

## 2021-10-01 NOTE — PROGRESS NOTES
Problem: Falls - Risk of  Goal: *Absence of Falls  Description: Document Georgina Ruvalcaba Fall Risk and appropriate interventions in the flowsheet.   Outcome: Progressing Towards Goal  Note: Fall Risk Interventions:  Mobility Interventions: Patient to call before getting OOB    Mentation Interventions: Adequate sleep, hydration, pain control, Evaluate medications/consider consulting pharmacy    Medication Interventions: Evaluate medications/consider consulting pharmacy, Patient to call before getting OOB, Teach patient to arise slowly    Elimination Interventions: Call light in reach, Patient to call for help with toileting needs              Problem: Patient Education: Go to Patient Education Activity  Goal: Patient/Family Education  Outcome: Progressing Towards Goal

## 2021-10-02 PROCEDURE — 74011250636 HC RX REV CODE- 250/636: Performed by: HOSPITALIST

## 2021-10-02 PROCEDURE — 74011250637 HC RX REV CODE- 250/637: Performed by: INTERNAL MEDICINE

## 2021-10-02 PROCEDURE — 77010033711 HC HIGH FLOW OXYGEN

## 2021-10-02 PROCEDURE — 94762 N-INVAS EAR/PLS OXIMTRY CONT: CPT

## 2021-10-02 PROCEDURE — 74011250637 HC RX REV CODE- 250/637: Performed by: EMERGENCY MEDICINE

## 2021-10-02 PROCEDURE — 74011250636 HC RX REV CODE- 250/636: Performed by: INTERNAL MEDICINE

## 2021-10-02 PROCEDURE — 99232 SBSQ HOSP IP/OBS MODERATE 35: CPT | Performed by: INTERNAL MEDICINE

## 2021-10-02 PROCEDURE — 65270000029 HC RM PRIVATE

## 2021-10-02 RX ADMIN — Medication 10 ML: at 08:38

## 2021-10-02 RX ADMIN — Medication 10 ML: at 05:09

## 2021-10-02 RX ADMIN — HYDROCODONE BITARTRATE AND ACETAMINOPHEN 1 TABLET: 7.5; 325 TABLET ORAL at 05:08

## 2021-10-02 RX ADMIN — Medication 5 MG: at 22:39

## 2021-10-02 RX ADMIN — Medication 10 ML: at 22:39

## 2021-10-02 RX ADMIN — Medication 10 ML: at 05:08

## 2021-10-02 RX ADMIN — FAMOTIDINE 20 MG: 20 TABLET, FILM COATED ORAL at 17:02

## 2021-10-02 RX ADMIN — FAMOTIDINE 20 MG: 20 TABLET, FILM COATED ORAL at 08:37

## 2021-10-02 RX ADMIN — ENOXAPARIN SODIUM 40 MG: 40 INJECTION SUBCUTANEOUS at 17:02

## 2021-10-02 RX ADMIN — Medication 10 ML: at 22:40

## 2021-10-02 RX ADMIN — ENOXAPARIN SODIUM 40 MG: 40 INJECTION SUBCUTANEOUS at 05:08

## 2021-10-02 RX ADMIN — DEXAMETHASONE 5 MG: 4 TABLET ORAL at 08:36

## 2021-10-02 NOTE — PROGRESS NOTES
Patient sleeping in bed RR even/unlabored on heated high flow 40L//40%  NAD noted at this time  Patient has slept well throughout the night with no complaints   Call light within reach  Will give BSR to oncoming RN upon arrival 7a-7p

## 2021-10-02 NOTE — PROGRESS NOTES
Hospitalist Progress Note   Admit Date:  2021  1:19 PM   Name:  Roxana Frances   Age:  42 y.o. Sex:  male  :  1980   MRN:  [id number]   Room:  [facility]/    Presenting Complaint: Respiratory Distress    Reason(s) for Admission: Acute hypoxemic respiratory failure due to COVID-19 ([facility] [U07.1, J96.01]     Hospital Course & Interval History: This is a 19-year-old male with no prior significant past medical history presented to the ER with worsening shortness of breath for almost 2 weeks prior to presentation. He tested positive for Covid on 9/10/2021. When EMS arrived, they were unable to  his oxygen saturation at home and was placed on 15 L oxygen with improvement of sats in the 80s. Chest x-ray reveals bilateral atypical pneumonia. CRP on admission 9.1. Patient is not vaccinated for COVID-19. He was initially admitted to the ICU on BiPAP and subsequently transitioned to AirVo. He was transferred to medical floor on . He received Tocilizumab on 2021. He is out of the window for remdesivir as symptoms have been present for more than 2 weeks prior to admission. He received dexamethasone currently on 5 mg daily. After patient was transferred to medical floor hospitalist was consulted to assume care. Subjective (10/02/21): This morning patient was weaned from AirVo to 5 L oxygen nasal cannula. He is able to maintain his oxygen saturations greater than 90%. No fever no chills. No chest pain no palpitations. Assessment & Plan:     Principal Problem:    Acute hypoxemic respiratory failure POA due to COVID-19 ([facility] (2021)  COVID-19 pneumonia POA  Oxygen requirements significantly improved. Pt is needing 5L HFNC to maintain sats greater than 88%. Intensivist signed off as pt is doing well. Incentive spirometry. Continue dexamethasone. Not a candidate for remdesivir as symptoms have been present for more than 2 weeks prior to admission.   Patient received Tocilizumab on 9/20/2021. Albuterol as needed. Home Oxygen screen prior to discharge. Leukopenia  Improved      Dispo/Discharge Planning: Anticipate discharge home in 48 hours if he continues to improve. Patient's wife updated regarding current plan of care today.     Diet:  ADULT DIET Regular  ADULT ORAL NUTRITION SUPPLEMENT Lunch; Standard High Calorie/High Protein  DVT PPx: Lovenox  GI prophylaxis Pepcid    Code status: Full Code    Hospital Problems as of 10/2/2021 Date Reviewed: 9/22/2021        Codes Class Noted - Resolved POA    Leukopenia ICD-10-CM: D72.819  ICD-9-CM: 288.50  9/21/2021 - Present Unknown        Pneumonia due to COVID-19 virus ICD-10-CM: U07.1, J12.82  ICD-9-CM: 480.8, 079.89  9/20/2021 - Present Unknown        * (Principal) Acute hypoxemic respiratory failure due to COVID-19 [facility]) ICD-10-CM: U07.1, J96.01  ICD-9-CM: 518.81, 079.89, 799.02  9/20/2021 - Present Unknown        RESOLVED: COVID-19 ICD-10-CM: U07.1  ICD-9-CM: 079.89  9/20/2021 - 9/29/2021 Unknown        RESOLVED: Acute respiratory failure with hypoxia (HCC) ICD-10-CM: J96.01  ICD-9-CM: 518.81  9/20/2021 - 9/29/2021 Unknown        RESOLVED: Shortness of breath ICD-10-CM: R06.02  ICD-9-CM: 786.05  9/20/2021 - 9/29/2021 Unknown              Objective:     Patient Vitals for the past 24 hrs:   Temp Pulse Resp BP SpO2   10/02/21 1126 98 °F (36.7 °C) 90 20 131/85 95 %   10/02/21 0853     94 %   10/02/21 0852     95 %   10/02/21 0812 97.9 °F (36.6 °C) 82 20 118/76 95 %   10/02/21 0320 98.6 °F (37 °C) 87 20 109/68 95 %   10/01/21 2302 98.2 °F (36.8 °C) 80 20 125/79 96 %   10/01/21 1939 98 °F (36.7 °C) 80 20 118/71 93 %   10/01/21 1725     93 %   10/01/21 1556 98.2 °F (36.8 °C) 87 20 117/79 90 %     Oxygen Therapy  O2 Sat (%): 95 % (10/02/21 1126)  Pulse via Oximetry: 90 beats per minute (10/02/21 0853)  O2 Device: Nasal cannula (10/02/21 0853)  Skin Assessment: Clean, dry, & intact (09/28/21 0715)  Skin Protection for O2 Device: Yes (09/27/21 0745)  Orientation: Middle (09/27/21 0745)  Location: Other (Comment) (nasal bridge) (09/27/21 0745)  Interventions: Mouth Care;Reposition Device (09/27/21 1600)  O2 Flow Rate (L/min): 5 l/min (10/02/21 0853)  O2 Temperature: 87.8 °F (31 °C) (10/02/21 0852)  FIO2 (%): 40 % (weaned to 5L NC) (10/02/21 5945)    Estimated body mass index is 26.07 kg/m² as calculated from the following:    Height as of this encounter: 6' (1.829 m). Weight as of this encounter: 87.2 kg (192 lb 3.2 oz). Intake/Output Summary (Last 24 hours) at 10/2/2021 1345  Last data filed at 10/2/2021 1259  Gross per 24 hour   Intake 640 ml   Output 2350 ml   Net -1710 ml         Physical Exam:     General:    Well nourished. Currently needing 5L HFNC  Head:  Normocephalic, atraumatic  Eyes:  Sclerae appear normal.  Pupils equally round. ENT:  Nares appear normal, no drainage. Moist oral mucosa  Neck:  No restricted ROM. Trachea midline   CV:   RRR. No m/r/g. No jugular venous distension. Lungs: No wheezing, no crackles, good air exchange. Respirations even, unlabored  Abdomen: Bowel sounds present. Soft, nontender, nondistended. Extremities: No cyanosis or clubbing. No edema  Skin:     No rashes and normal coloration. Warm and dry. Neuro:  Cranial nerves II-XII grossly intact. Sensation intact  Psych:  Normal mood and affect.   Alert and oriented x3    I have reviewed ordered lab tests and independently visualized imaging below:    Last 24hr Labs:  Recent Results (from the past 24 hour(s))   PROCALCITONIN    Collection Time: 10/01/21  2:22 PM   Result Value Ref Range    Procalcitonin <0.05 ng/mL   D DIMER    Collection Time: 10/01/21  2:22 PM   Result Value Ref Range    D DIMER 0.95 (H) <0.56 ug/ml(FEU)   NT-PRO BNP    Collection Time: 10/01/21  2:22 PM   Result Value Ref Range    NT pro-BNP 28 5 - 125 PG/ML       All Micro Results     Procedure Component Value Units Date/Time CULTURE, RESPIRATORY/SPUTUM/BRONCH Merle Thomas [[id number]] Collected: 10/01/21 1010    Order Status: Completed Specimen: Sputum Updated: 10/01/21 1226          Other Studies:  No results found. Current Meds:  Current Facility-Administered Medications   Medication Dose Route Frequency    albuterol (PROVENTIL HFA, VENTOLIN HFA, PROAIR HFA) inhaler 2 Puff  2 Puff Inhalation Q4H PRN    famotidine (PEPCID) tablet 20 mg  20 mg Oral BID    melatonin tablet 5 mg  5 mg Oral QHS    zolpidem (AMBIEN) tablet 5 mg  5 mg Oral QHS PRN    phenol throat spray (CHLORASEPTIC) 1 Spray  1 Spray Oral PRN    HYDROcodone-acetaminophen (NORCO) 7.5-325 mg per tablet 1 Tablet  1 Tablet Oral Q6H    NUTRITIONAL SUPPORT ELECTROLYTE PRN ORDERS   Does Not Apply PRN    central line flush (saline) syringe 10 mL  10 mL InterCATHeter Q8H    LORazepam (ATIVAN) injection 1 mg  1 mg IntraVENous Q6H PRN    sodium chloride (NS) flush 5-40 mL  5-40 mL IntraVENous Q8H    sodium chloride (NS) flush 5-40 mL  5-40 mL IntraVENous Q8H    sodium chloride (NS) flush 5-40 mL  5-40 mL IntraVENous PRN    acetaminophen (TYLENOL) tablet 650 mg  650 mg Oral Q4H PRN    bisacodyL (DULCOLAX) suppository 10 mg  10 mg Rectal DAILY PRN    enoxaparin (LOVENOX) injection 40 mg  40 mg SubCUTAneous Q12H    morphine injection 2 mg  2 mg IntraVENous Q4H PRN       Signed:  Samira Hatch MD    Part of this note may have been written by using a voice dictation software. The note has been proof read but may still contain some grammatical/other typographical errors.

## 2021-10-02 NOTE — PROGRESS NOTES
Problem: Falls - Risk of  Goal: *Absence of Falls  Description: Document Clydene Bone Fall Risk and appropriate interventions in the flowsheet. Outcome: Progressing Towards Goal  Note: Fall Risk Interventions:  Mobility Interventions: Bed/chair exit alarm, Patient to call before getting OOB    Mentation Interventions: Adequate sleep, hydration, pain control, Bed/chair exit alarm    Medication Interventions: Bed/chair exit alarm, Patient to call before getting OOB, Teach patient to arise slowly    Elimination Interventions: Bed/chair exit alarm, Call light in reach              Problem: Patient Education: Go to Patient Education Activity  Goal: Patient/Family Education  Outcome: Progressing Towards Goal     Problem: Pressure Injury - Risk of  Goal: *Prevention of pressure injury  Description: Document Jai Scale and appropriate interventions in the flowsheet. Outcome: Progressing Towards Goal  Note: Pressure Injury Interventions:  Sensory Interventions: Assess changes in LOC    Moisture Interventions: Absorbent underpads    Activity Interventions: Pressure redistribution bed/mattress(bed type)    Mobility Interventions: Pressure redistribution bed/mattress (bed type)    Nutrition Interventions: Document food/fluid/supplement intake    Friction and Shear Interventions: Minimize layers                Problem: Pressure Injury - Risk of  Goal: *Prevention of pressure injury  Description: Document Jai Scale and appropriate interventions in the flowsheet.   Outcome: Progressing Towards Goal  Note: Pressure Injury Interventions:  Sensory Interventions: Assess changes in LOC    Moisture Interventions: Absorbent underpads    Activity Interventions: Pressure redistribution bed/mattress(bed type)    Mobility Interventions: Pressure redistribution bed/mattress (bed type)    Nutrition Interventions: Document food/fluid/supplement intake    Friction and Shear Interventions: Minimize layers                Problem: Patient Education: Go to Patient Education Activity  Goal: Patient/Family Education  Outcome: Progressing Towards Goal     Problem: Delirium Treatment  Goal: *Level of consciousness restored to baseline  Outcome: Progressing Towards Goal  Goal: *Level of environmental perceptions restored to baseline  Outcome: Progressing Towards Goal  Goal: *Sensory perception restored to baseline  Outcome: Progressing Towards Goal  Goal: *Emotional stability restored to baseline  Outcome: Progressing Towards Goal  Goal: *Functional assessment restored to baseline  Outcome: Progressing Towards Goal  Goal: *Absence of falls  Outcome: Progressing Towards Goal  Goal: *Will remain free of delirium, CAM Score negative  Outcome: Progressing Towards Goal  Goal: *Cognitive status will be restored to baseline  Outcome: Progressing Towards Goal  Goal: Interventions  Outcome: Progressing Towards Goal     Problem: Patient Education: Go to Patient Education Activity  Goal: Patient/Family Education  Outcome: Progressing Towards Goal     Problem: Patient Education: Go to Patient Education Activity  Goal: Patient/Family Education  Outcome: Progressing Towards Goal

## 2021-10-02 NOTE — PROGRESS NOTES
[address] [facility], [address]  Admission Date: 9/20/2021         Daily Progress Note: 10/2/2021    The patient's chart is reviewed and the patient is discussed with the staff. Background: Patient is C 88 y.o.  male seen and evaluated at the request of Dr. Desire Ramon for dyspnea and COVID 19.  No medical history. The patient presented to the ED with c/o worsening shortness of breath for almost two weeks.  The patient tested positive for COVID at [facility] 9/10/2021.  The patient c/o sinus congestion and cough.  EMS was unable to  O2 sat at home and patient was placed on 15L O2 with improvement of O2 sats in the 80s, no fever noted on arrival to ED [facility] reveals bilateral atypical pneumonia, CRP 9.1, ABG reveals pH 7.43, CO2 37.1, O2 87, HCO3 24.6.      The pt is unvaccinated. He has had fever and difficulty taking deep breaths. He reports some pain with breathing but no hemoptysis. He has not had any purulent sputum.     Date of COVID-19 symptom onset: 9/6  COVID-19 Meds:  Dexamethasone 6mg daily: 9/24-9/28, now 5mg 9/29  Remdesivir: out of window  Actemra: 9/20  Monoclonal Abs: none  Vaccination:  none  No results found for requested labs within last 30 days. Subjective:     Patient alert, sitting up. Denies any SOB, MCLEAN. Having some cough. Currently on Airvo 40L/40% with sat 95%. States he slept well.     Current Facility-Administered Medications   Medication Dose Route Frequency    albuterol (PROVENTIL HFA, VENTOLIN HFA, PROAIR HFA) inhaler 2 Puff  2 Puff Inhalation Q4H PRN    dexAMETHasone (DECADRON) tablet 5 mg  5 mg Oral DAILY    famotidine (PEPCID) tablet 20 mg  20 mg Oral BID    melatonin tablet 5 mg  5 mg Oral QHS    zolpidem (AMBIEN) tablet 5 mg  5 mg Oral QHS PRN    phenol throat spray (CHLORASEPTIC) 1 Spray  1 Spray Oral PRN    HYDROcodone-acetaminophen (NORCO) 7.5-325 mg per tablet 1 Tablet  1 Tablet Oral Q6H    NUTRITIONAL SUPPORT ELECTROLYTE PRN ORDERS   Does Not Apply PRN    central line flush (saline) syringe 10 mL  10 mL InterCATHeter Q8H    LORazepam (ATIVAN) injection 1 mg  1 mg IntraVENous Q6H PRN    sodium chloride (NS) flush 5-40 mL  5-40 mL IntraVENous Q8H    sodium chloride (NS) flush 5-40 mL  5-40 mL IntraVENous Q8H    sodium chloride (NS) flush 5-40 mL  5-40 mL IntraVENous PRN    acetaminophen (TYLENOL) tablet 650 mg  650 mg Oral Q4H PRN    bisacodyL (DULCOLAX) suppository 10 mg  10 mg Rectal DAILY PRN    enoxaparin (LOVENOX) injection 40 mg  40 mg SubCUTAneous Q12H    morphine injection 2 mg  2 mg IntraVENous Q4H PRN     Review of Systems    Constitutional: negative for fever, chills, sweats  Cardiovascular: negative for chest pain, palpitations, syncope, edema  Gastrointestinal:  negative for dysphagia, reflux, vomiting, diarrhea, abdominal pain, or melena  Neurologic:  negative for focal weakness, numbness, headache  Objective:     Vitals:    10/01/21 1725 10/01/21 1939 10/01/21 2302 10/02/21 0320   BP:  118/71 125/79 109/68   Pulse:  80 80 87   Resp:  20 20 20   Temp:  98 °F (36.7 °C) 98.2 °F (36.8 °C) 98.6 °F (37 °C)   SpO2: 93% 93% 96% 95%   Weight:    192 lb 3.2 oz (87.2 kg)   Height:           Intake/Output Summary (Last 24 hours) at 10/2/2021 0743  Last data filed at 10/2/2021 0321  Gross per 24 hour   Intake 640 ml   Output 2850 ml   Net -2210 ml     Physical Exam:   Constitution:  the patient is well developed and in no acute distress  HEENT:  Sclera clear, pupils equal, oral mucosa moist  Respiratory: clear on airvo  Cardiovascular:  RRR without M,G,R  Gastrointestinal: soft and non-tender; with positive bowel sounds. Musculoskeletal: warm without cyanosis. There is no lower extremity edema.   Skin:  no jaundice or rashes, no wounds   Neurologic: no gross neuro deficits     Psychiatric:  alert and oriented x 3    CXR: 9/30      LAB:  Recent Labs     10/01/21  1422 10/01/21  0607   WBC  --  4.4   HGB  --  13.9   HCT  --  41.7 PLT  --  287   PCT <0.05  --      Recent Labs     10/01/21  0607   *   K 4.4      CO2 31   GLU 98   BUN 20   CREA 0.86   MG 2.2   CA 9.1   PHOS 4.2     Recent Labs     10/01/21  1422   BNPNT 28     No results for input(s): PH, PCO2, PO2, HCO3, PHI, PCO2I, PO2I, HCO3I in the last 72 hours. No results for input(s): SDES, CULT in the last 72 hours. Assessment and Plan:  (Medical Decision Making)   Principal Problem:    Acute hypoxemic respiratory failure due to COVID-19 ([address] (9/20/2021)    On airvo 40L/40%, wean FiO2 as tolerated. Continue IS, decadron albuterol PRN, mobilize as tolerated. Active Problems:    Pneumonia due to COVID-19 virus (9/20/2021)    procal negative, D-Dimer, 0.95, BNP 28, sputum culture in process      Leukopenia (9/21/2021)    improved      More than 50% of the time documented was spent in face-to-face contact with the patient and in the care of the patient on the floor/unit where the patient is located. Respiratory status is stable on 40L/40% airvo. No further pulmonary needs noted at this time and we have nothing further to add. We will sign off but please call if we can be of further assistance. Janina Márquez NP     The patient has been seen and examined by me personally, the chart,labs, and radiographic studies have been reviewed. On 6L via NC at time of exam  Chest:CTA  Extremities:no edema    I agree with the above assessment and plan.   Nothing to add continue to wean off O2, consider even home with O2 with OP follow up if unable to fully come of oxygen in the next 2-3 days  Jeb Hernandez MD.

## 2021-10-02 NOTE — PROGRESS NOTES
10/02/21 0852   Oxygen Therapy   O2 Sat (%) 95 %   Pulse via Oximetry 90 beats per minute   O2 Device Heated; Hi flow nasal cannula   O2 Flow Rate (L/min) 40 l/min   O2 Temperature 87.8 °F (31 °C)   FIO2 (%) 40 %  (weaned to 5L NC)

## 2021-10-02 NOTE — PROGRESS NOTES
Assumed care of patient. Assessment completed and documented, see docflow. Patient resting quietly in bed. NAD noted at present. Respirations even and non labored on Airvo 40L/40%. Awakens to voice, alert and oriented x 4. Oximetry at bedside, 94%. Encouraged to call for needs. Call light within reach. Will continue to monitor.

## 2021-10-03 LAB
BACTERIA SPEC CULT: NORMAL
GRAM STN SPEC: NORMAL
SERVICE CMNT-IMP: NORMAL

## 2021-10-03 PROCEDURE — 94762 N-INVAS EAR/PLS OXIMTRY CONT: CPT

## 2021-10-03 PROCEDURE — 74011250637 HC RX REV CODE- 250/637: Performed by: EMERGENCY MEDICINE

## 2021-10-03 PROCEDURE — 74011000250 HC RX REV CODE- 250: Performed by: FAMILY MEDICINE

## 2021-10-03 PROCEDURE — 74011250637 HC RX REV CODE- 250/637: Performed by: FAMILY MEDICINE

## 2021-10-03 PROCEDURE — 74011250636 HC RX REV CODE- 250/636: Performed by: FAMILY MEDICINE

## 2021-10-03 PROCEDURE — 74011250637 HC RX REV CODE- 250/637: Performed by: INTERNAL MEDICINE

## 2021-10-03 PROCEDURE — 77010033678 HC OXYGEN DAILY

## 2021-10-03 PROCEDURE — 74011250636 HC RX REV CODE- 250/636: Performed by: INTERNAL MEDICINE

## 2021-10-03 PROCEDURE — 65270000029 HC RM PRIVATE

## 2021-10-03 RX ORDER — GUAIFENESIN/DEXTROMETHORPHAN 100-10MG/5
10 SYRUP ORAL
Status: DISCONTINUED | OUTPATIENT
Start: 2021-10-03 | End: 2021-10-04 | Stop reason: HOSPADM

## 2021-10-03 RX ORDER — GUAIFENESIN 600 MG/1
600 TABLET, EXTENDED RELEASE ORAL EVERY 12 HOURS
Status: DISCONTINUED | OUTPATIENT
Start: 2021-10-03 | End: 2021-10-04 | Stop reason: HOSPADM

## 2021-10-03 RX ORDER — FLUTICASONE PROPIONATE 50 MCG
2 SPRAY, SUSPENSION (ML) NASAL DAILY
Status: DISCONTINUED | OUTPATIENT
Start: 2021-10-04 | End: 2021-10-04 | Stop reason: HOSPADM

## 2021-10-03 RX ORDER — GUAIFENESIN/DEXTROMETHORPHAN 100-10MG/5
10 SYRUP ORAL ONCE
Status: COMPLETED | OUTPATIENT
Start: 2021-10-03 | End: 2021-10-03

## 2021-10-03 RX ADMIN — GUAIFENESIN 600 MG: 600 TABLET ORAL at 21:43

## 2021-10-03 RX ADMIN — ALTEPLASE 1 MG: 2.2 INJECTION, POWDER, LYOPHILIZED, FOR SOLUTION INTRAVENOUS at 23:46

## 2021-10-03 RX ADMIN — Medication 10 ML: at 05:48

## 2021-10-03 RX ADMIN — FAMOTIDINE 20 MG: 20 TABLET, FILM COATED ORAL at 17:18

## 2021-10-03 RX ADMIN — SALINE NASAL SPRAY 2 SPRAY: 1.5 SOLUTION NASAL at 21:44

## 2021-10-03 RX ADMIN — Medication 10 ML: at 05:47

## 2021-10-03 RX ADMIN — ENOXAPARIN SODIUM 40 MG: 40 INJECTION SUBCUTANEOUS at 06:23

## 2021-10-03 RX ADMIN — Medication 10 ML: at 21:44

## 2021-10-03 RX ADMIN — Medication 5 MG: at 21:44

## 2021-10-03 RX ADMIN — Medication 10 ML: at 08:26

## 2021-10-03 RX ADMIN — ENOXAPARIN SODIUM 40 MG: 40 INJECTION SUBCUTANEOUS at 17:18

## 2021-10-03 RX ADMIN — FAMOTIDINE 20 MG: 20 TABLET, FILM COATED ORAL at 08:25

## 2021-10-03 RX ADMIN — Medication 10 ML: at 21:43

## 2021-10-03 RX ADMIN — GUAIFENESIN SYRUP AND DEXTROMETHORPHAN 10 ML: 100; 10 SYRUP ORAL at 21:44

## 2021-10-03 NOTE — PROGRESS NOTES
Problem: Falls - Risk of  Goal: *Absence of Falls  Description: Document Georgia Hamlin Fall Risk and appropriate interventions in the flowsheet. Outcome: Progressing Towards Goal  Note: Fall Risk Interventions:  Mobility Interventions: Bed/chair exit alarm, Patient to call before getting OOB    Mentation Interventions: Adequate sleep, hydration, pain control, Bed/chair exit alarm    Medication Interventions: Bed/chair exit alarm, Patient to call before getting OOB, Teach patient to arise slowly    Elimination Interventions: Bed/chair exit alarm, Call light in reach              Problem: Patient Education: Go to Patient Education Activity  Goal: Patient/Family Education  Outcome: Progressing Towards Goal     Problem: Pressure Injury - Risk of  Goal: *Prevention of pressure injury  Description: Document Jai Scale and appropriate interventions in the flowsheet.   Outcome: Progressing Towards Goal  Note: Pressure Injury Interventions:  Sensory Interventions: Assess changes in LOC    Moisture Interventions: Absorbent underpads    Activity Interventions: Pressure redistribution bed/mattress(bed type)    Mobility Interventions: Pressure redistribution bed/mattress (bed type)    Nutrition Interventions: Document food/fluid/supplement intake    Friction and Shear Interventions: Minimize layers                Problem: Patient Education: Go to Patient Education Activity  Goal: Patient/Family Education  Outcome: Progressing Towards Goal     Problem: Delirium Treatment  Goal: *Level of consciousness restored to baseline  Outcome: Progressing Towards Goal  Goal: *Level of environmental perceptions restored to baseline  Outcome: Progressing Towards Goal  Goal: *Sensory perception restored to baseline  Outcome: Progressing Towards Goal  Goal: *Emotional stability restored to baseline  Outcome: Progressing Towards Goal  Goal: *Functional assessment restored to baseline  Outcome: Progressing Towards Goal  Goal: *Absence of falls  Outcome: Progressing Towards Goal  Goal: *Will remain free of delirium, CAM Score negative  Outcome: Progressing Towards Goal  Goal: *Cognitive status will be restored to baseline  Outcome: Progressing Towards Goal  Goal: Interventions  Outcome: Progressing Towards Goal     Problem: Patient Education: Go to Patient Education Activity  Goal: Patient/Family Education  Outcome: Progressing Towards Goal     Problem: Patient Education: Go to Patient Education Activity  Goal: Patient/Family Education  Outcome: Progressing Towards Goal

## 2021-10-03 NOTE — PROGRESS NOTES
Assumed care of patient. Assessment completed and documented, see docflow. Patient awake in bed. NAD noted at present. Respirations even and non labored on 5LNCHF. Oximetry at bedside, 95%. Encouraged to call for needs. Call light within reach. Will continue to monitor.

## 2021-10-03 NOTE — PROGRESS NOTES
ACE hairston from [facility]. Patient in stable condition at this time. Safety measures in place. No S/Sx of distress. Respirations even and unlabored on 5 L HF NC.  Call light within reach and patient encouraged to call nurse prn assist.

## 2021-10-03 NOTE — PROGRESS NOTES
Patient remains stable. No S/Sx of distress. Respirations even and unlabored on 5L NC. Call light within reach. Preparing to give report to oncoming shift.

## 2021-10-03 NOTE — PROGRESS NOTES
Hospitalist Progress Note   Admit Date:  2021  1:19 PM   Name:  Mellissa Soares   Age:  42 y.o. Sex:  male  :  1980   MRN:  [id number]   Room:  [facility]/    Presenting Complaint: Respiratory Distress    Reason(s) for Admission: Acute hypoxemic respiratory failure due to COVID-19 ([facility] [address] [U07.1, J96.01]     Hospital Course & Interval History: This is a 40-year-old male with no prior significant past medical history presented to the ER with worsening shortness of breath for almost 2 weeks prior to presentation. He tested positive for Covid on 9/10/2021. When EMS arrived, they were unable to  his oxygen saturation at home and was placed on 15 L oxygen with improvement of sats in the 80s. Chest x-ray reveals bilateral atypical pneumonia. CRP on admission 9.1. Patient is not vaccinated for COVID-19. He was initially admitted to the ICU on BiPAP and subsequently transitioned to AirVo. He was transferred to medical floor on . He received Tocilizumab on 2021. He is out of the window for remdesivir as symptoms have been present for more than 2 weeks prior to admission. He received dexamethasone currently on 5 mg daily. After patient was transferred to medical floor hospitalist was consulted to assume care. Subjective (10/03/21): Pt still needing 5-6L HFNC, to maintain sats> 88%. No fever. No chest pain, no palpitations. No abdominal pain. Pt reports feeling better. Assessment & Plan:     Principal Problem:    Acute hypoxemic respiratory failure POA due to COVID-19 ([facility] [address] (2021)  COVID-19 pneumonia POA  Oxygen requirements significantly improved. Pt is still needing 5-6L HFNC to maintain sats greater than 88%. Intensivist signed off 10/2 as pt is doing well. Incentive spirometry. Continue dexamethasone. Not a candidate for remdesivir as symptoms have been present for more than 2 weeks prior to admission.   Patient received Tocilizumab on 9/20/2021. Albuterol as needed. Home Oxygen screen prior to discharge. Leukopenia  Improved      Dispo/Discharge Planning: Anticipate discharge home in 24- 48 hours if he continues to improve. Home Oxygen screen prior to discharge. Patient's wife updated regarding current plan of care today.     Diet:  ADULT DIET Regular  ADULT ORAL NUTRITION SUPPLEMENT Lunch; Standard High Calorie/High Protein  DVT PPx: Lovenox  GI prophylaxis Pepcid    Code status: Full Code    Hospital Problems as of 10/3/2021 Date Reviewed: 9/22/2021        Codes Class Noted - Resolved POA    Leukopenia ICD-10-CM: D72.819  ICD-9-CM: 288.50  9/21/2021 - Present Unknown        Pneumonia due to COVID-19 virus ICD-10-CM: U07.1, J12.82  ICD-9-CM: 480.8, 079.89  9/20/2021 - Present Unknown        * (Principal) Acute hypoxemic respiratory failure due to COVID-19 [facility]) ICD-10-CM: U07.1, J96.01  ICD-9-CM: 518.81, 079.89, 799.02  9/20/2021 - Present Unknown        RESOLVED: COVID-19 ICD-10-CM: U07.1  ICD-9-CM: 079.89  9/20/2021 - 9/29/2021 Unknown        RESOLVED: Acute respiratory failure with hypoxia ([facility] [address] ICD-10-CM: J96.01  ICD-9-CM: 518.81  9/20/2021 - 9/29/2021 Unknown        RESOLVED: Shortness of breath ICD-10-CM: R06.02  ICD-9-CM: 786.05  9/20/2021 - 9/29/2021 Unknown              Objective:     Patient Vitals for the past 24 hrs:   Temp Pulse Resp BP SpO2   10/03/21 1117 97.8 °F (36.6 °C) 98 20 111/75 96 %   10/03/21 0623    112/72    10/03/21 0437 97.8 °F (36.6 °C) 61 20 (!) 92/53 94 %   10/02/21 2337 98.3 °F (36.8 °C) 81 20 109/72 96 %   10/02/21 2226     96 %   10/02/21 1953 98.4 °F (36.9 °C) 82 20 126/86 92 %   10/02/21 1538 98.1 °F (36.7 °C) 95 20 118/77 91 %   10/02/21 1513     93 %     Oxygen Therapy  O2 Sat (%): 96 % (10/03/21 1117)  Pulse via Oximetry: 73 beats per minute (10/02/21 2226)  O2 Device: Nasal cannula (10/03/21 3867)  Skin Assessment: Clean, dry, & intact (09/28/21 1872)  Skin Protection for O2 Device: Yes (09/27/21 0745)  Orientation: Middle (09/27/21 0745)  Location: Other (Comment) (nasal bridge) (09/27/21 0745)  Interventions: Mouth Care;Reposition Device (09/27/21 1600)  O2 Flow Rate (L/min): 5 l/min (10/03/21 0417)  O2 Temperature: 87.8 °F (31 °C) (10/02/21 0852)  FIO2 (%): 40 % (weaned to 5L NC) (10/02/21 0356)    Estimated body mass index is 26.07 kg/m² as calculated from the following:    Height as of this encounter: 6' (1.829 m). Weight as of this encounter: 87.2 kg (192 lb 3.2 oz). Intake/Output Summary (Last 24 hours) at 10/3/2021 1431  Last data filed at 10/3/2021 0908  Gross per 24 hour   Intake 240 ml   Output 1500 ml   Net -1260 ml         Physical Exam:     General:    Well nourished. Currently needing 5L HFNC  Head:  Normocephalic, atraumatic  Eyes:  Sclerae appear normal.  Pupils equally round. ENT:  Nares appear normal, no drainage. Moist oral mucosa  Neck:  No restricted ROM. Trachea midline   CV:   RRR. No m/r/g. No jugular venous distension. Lungs: No wheezing, no crackles, good air exchange. Respirations even, unlabored  Abdomen: Bowel sounds present. Soft, nontender, nondistended. Extremities: No cyanosis or clubbing. No edema  Skin:     No rashes and normal coloration. Warm and dry. Neuro:  Cranial nerves II-XII grossly intact. Sensation intact  Psych:  Normal mood and affect. Alert and oriented x3    I have reviewed ordered lab tests and independently visualized imaging below:    Last 24hr Labs:  No results found for this or any previous visit (from the past 24 hour(s)).     All Micro Results     Procedure Component Value Units Date/Time    CULTURE, RESPIRATORY/SPUTUM/BRONCH Malloyrupa Florian [[id number]] Collected: 10/01/21 1010    Order Status: Completed Specimen: Sputum Updated: 10/03/21 4023     Special Requests: NO SPECIAL REQUESTS        GRAM STAIN 0 TO 12 WBCS SEEN PER OIF      0 TO 3 EPITHELIAL CELLS SEEN PER OIF      MANY GRAM POSITIVE COCCI MODERATE GRAM NEGATIVE COCCI            FEW GRAM NEGATIVE RODS         FEW GRAM POSITIVE RODS        Culture result:       MODERATE NORMAL RESPIRATORY ELOY                Other Studies:  No results found. Current Meds:  Current Facility-Administered Medications   Medication Dose Route Frequency    albuterol (PROVENTIL HFA, VENTOLIN HFA, PROAIR HFA) inhaler 2 Puff  2 Puff Inhalation Q4H PRN    famotidine (PEPCID) tablet 20 mg  20 mg Oral BID    melatonin tablet 5 mg  5 mg Oral QHS    zolpidem (AMBIEN) tablet 5 mg  5 mg Oral QHS PRN    phenol throat spray (CHLORASEPTIC) 1 Spray  1 Spray Oral PRN    HYDROcodone-acetaminophen (NORCO) 7.5-325 mg per tablet 1 Tablet  1 Tablet Oral Q6H    NUTRITIONAL SUPPORT ELECTROLYTE PRN ORDERS   Does Not Apply PRN    central line flush (saline) syringe 10 mL  10 mL InterCATHeter Q8H    LORazepam (ATIVAN) injection 1 mg  1 mg IntraVENous Q6H PRN    sodium chloride (NS) flush 5-40 mL  5-40 mL IntraVENous Q8H    sodium chloride (NS) flush 5-40 mL  5-40 mL IntraVENous Q8H    sodium chloride (NS) flush 5-40 mL  5-40 mL IntraVENous PRN    acetaminophen (TYLENOL) tablet 650 mg  650 mg Oral Q4H PRN    bisacodyL (DULCOLAX) suppository 10 mg  10 mg Rectal DAILY PRN    enoxaparin (LOVENOX) injection 40 mg  40 mg SubCUTAneous Q12H    morphine injection 2 mg  2 mg IntraVENous Q4H PRN       Signed:  Tee Westbrook MD    Part of this note may have been written by using a voice dictation software. The note has been proof read but may still contain some grammatical/other typographical errors.

## 2021-10-04 VITALS
OXYGEN SATURATION: 94 % | BODY MASS INDEX: 26.03 KG/M2 | WEIGHT: 192.2 LBS | HEIGHT: 72 IN | RESPIRATION RATE: 18 BRPM | HEART RATE: 98 BPM | SYSTOLIC BLOOD PRESSURE: 116 MMHG | TEMPERATURE: 98 F | DIASTOLIC BLOOD PRESSURE: 82 MMHG

## 2021-10-04 LAB
ANION GAP SERPL CALC-SCNC: 7 MMOL/L (ref 7–16)
BUN SERPL-MCNC: 21 MG/DL (ref 6–23)
CALCIUM SERPL-MCNC: 9.1 MG/DL (ref 8.3–10.4)
CHLORIDE SERPL-SCNC: 104 MMOL/L (ref 98–107)
CO2 SERPL-SCNC: 30 MMOL/L (ref 21–32)
CREAT SERPL-MCNC: 0.93 MG/DL (ref 0.8–1.5)
GLUCOSE SERPL-MCNC: 90 MG/DL (ref 65–100)
MAGNESIUM SERPL-MCNC: 2.3 MG/DL (ref 1.8–2.4)
PHOSPHATE SERPL-MCNC: 5.5 MG/DL (ref 2.5–4.5)
POTASSIUM SERPL-SCNC: 4.2 MMOL/L (ref 3.5–5.1)
SODIUM SERPL-SCNC: 141 MMOL/L (ref 136–145)

## 2021-10-04 PROCEDURE — 36415 COLL VENOUS BLD VENIPUNCTURE: CPT

## 2021-10-04 PROCEDURE — 74011250637 HC RX REV CODE- 250/637: Performed by: INTERNAL MEDICINE

## 2021-10-04 PROCEDURE — 83735 ASSAY OF MAGNESIUM: CPT

## 2021-10-04 PROCEDURE — 94761 N-INVAS EAR/PLS OXIMETRY MLT: CPT

## 2021-10-04 PROCEDURE — 97110 THERAPEUTIC EXERCISES: CPT

## 2021-10-04 PROCEDURE — 77010033678 HC OXYGEN DAILY

## 2021-10-04 PROCEDURE — 74011250636 HC RX REV CODE- 250/636: Performed by: INTERNAL MEDICINE

## 2021-10-04 PROCEDURE — 74011250637 HC RX REV CODE- 250/637: Performed by: FAMILY MEDICINE

## 2021-10-04 PROCEDURE — 84100 ASSAY OF PHOSPHORUS: CPT

## 2021-10-04 PROCEDURE — 80048 BASIC METABOLIC PNL TOTAL CA: CPT

## 2021-10-04 PROCEDURE — 94762 N-INVAS EAR/PLS OXIMTRY CONT: CPT

## 2021-10-04 RX ORDER — ALBUTEROL SULFATE 90 UG/1
2 AEROSOL, METERED RESPIRATORY (INHALATION)
Qty: 1 EACH | Refills: 0 | Status: SHIPPED | OUTPATIENT
Start: 2021-10-04 | End: 2021-10-19

## 2021-10-04 RX ORDER — GUAIFENESIN/DEXTROMETHORPHAN 100-10MG/5
10 SYRUP ORAL
Qty: 1 EACH | Refills: 0 | Status: SHIPPED | OUTPATIENT
Start: 2021-10-04 | End: 2021-10-14

## 2021-10-04 RX ADMIN — ENOXAPARIN SODIUM 40 MG: 40 INJECTION SUBCUTANEOUS at 05:37

## 2021-10-04 RX ADMIN — Medication 10 ML: at 05:37

## 2021-10-04 RX ADMIN — FLUTICASONE PROPIONATE 2 SPRAY: 50 SPRAY, METERED NASAL at 08:52

## 2021-10-04 RX ADMIN — GUAIFENESIN 600 MG: 600 TABLET ORAL at 08:52

## 2021-10-04 RX ADMIN — SALINE NASAL SPRAY 2 SPRAY: 1.5 SOLUTION NASAL at 12:28

## 2021-10-04 RX ADMIN — FAMOTIDINE 20 MG: 20 TABLET, FILM COATED ORAL at 08:52

## 2021-10-04 RX ADMIN — Medication 10 ML: at 08:53

## 2021-10-04 RX ADMIN — GUAIFENESIN SYRUP AND DEXTROMETHORPHAN 10 ML: 100; 10 SYRUP ORAL at 08:52

## 2021-10-04 NOTE — PROGRESS NOTES
Called patient's spouse at [phone] discussed discharge instructions, prescriptions and  time. All questions answered.

## 2021-10-04 NOTE — PROGRESS NOTES
Problem: Falls - Risk of  Goal: *Absence of Falls  Description: Document Neeraj Li Fall Risk and appropriate interventions in the flowsheet. Outcome: Progressing Towards Goal  Note: Fall Risk Interventions:  Mobility Interventions: Bed/chair exit alarm, Patient to call before getting OOB    Mentation Interventions: Adequate sleep, hydration, pain control, Bed/chair exit alarm    Medication Interventions: Bed/chair exit alarm, Patient to call before getting OOB, Teach patient to arise slowly    Elimination Interventions: Call light in reach, Bed/chair exit alarm              Problem: Patient Education: Go to Patient Education Activity  Goal: Patient/Family Education  Outcome: Progressing Towards Goal     Problem: Pressure Injury - Risk of  Goal: *Prevention of pressure injury  Description: Document Jai Scale and appropriate interventions in the flowsheet.   Outcome: Progressing Towards Goal  Note: Pressure Injury Interventions:  Sensory Interventions: Assess changes in LOC    Moisture Interventions: Minimize layers    Activity Interventions: Pressure redistribution bed/mattress(bed type)    Mobility Interventions: Pressure redistribution bed/mattress (bed type)    Nutrition Interventions: Document food/fluid/supplement intake    Friction and Shear Interventions: Apply protective barrier, creams and emollients                Problem: Patient Education: Go to Patient Education Activity  Goal: Patient/Family Education  Outcome: Progressing Towards Goal     Problem: Breathing Pattern - Ineffective  Goal: *Absence of hypoxia  Outcome: Progressing Towards Goal  Goal: *Use of effective breathing techniques  Outcome: Progressing Towards Goal     Problem: Patient Education: Go to Patient Education Activity  Goal: Patient/Family Education  Outcome: Progressing Towards Goal

## 2021-10-04 NOTE — PROGRESS NOTES
Problem: Falls - Risk of  Goal: *Absence of Falls  Description: Document [place] Fall Risk and appropriate interventions in the flowsheet. 10/4/2021 1437 by Gabriela Gerard RN  Outcome: Resolved/Met  Note: Fall Risk Interventions:  Mobility Interventions: Bed/chair exit alarm, Patient to call before getting OOB    Mentation Interventions: Adequate sleep, hydration, pain control, Bed/chair exit alarm    Medication Interventions: Bed/chair exit alarm, Patient to call before getting OOB, Teach patient to arise slowly    Elimination Interventions: Call light in reach, Bed/chair exit alarm           10/4/2021 0731 by Gabriela Gerard RN  Outcome: Progressing Towards Goal  Note: Fall Risk Interventions:  Mobility Interventions: Bed/chair exit alarm, Patient to call before getting OOB    Mentation Interventions: Adequate sleep, hydration, pain control, Bed/chair exit alarm    Medication Interventions: Bed/chair exit alarm, Patient to call before getting OOB, Teach patient to arise slowly    Elimination Interventions: Call light in reach, Bed/chair exit alarm              Problem: Patient Education: Go to Patient Education Activity  Goal: Patient/Family Education  10/4/2021 1437 by Gabriela Gerard RN  Outcome: Resolved/Met  10/4/2021 0731 by Gabriela Gerard RN  Outcome: Progressing Towards Goal     Problem: Pressure Injury - Risk of  Goal: *Prevention of pressure injury  Description: Document Jai Scale and appropriate interventions in the flowsheet.   10/4/2021 1437 by Gabriela Gerard RN  Outcome: Resolved/Met  Note: Pressure Injury Interventions:  Sensory Interventions: Assess changes in LOC    Moisture Interventions: Minimize layers    Activity Interventions: Pressure redistribution bed/mattress(bed type)    Mobility Interventions: Pressure redistribution bed/mattress (bed type)    Nutrition Interventions: Document food/fluid/supplement intake    Friction and Shear Interventions: Apply protective barrier, creams and emollients             10/4/2021 0731 by Ac Friday, RN  Outcome: Progressing Towards Goal  Note: Pressure Injury Interventions:  Sensory Interventions: Assess changes in LOC    Moisture Interventions: Minimize layers    Activity Interventions: Pressure redistribution bed/mattress(bed type)    Mobility Interventions: Pressure redistribution bed/mattress (bed type)    Nutrition Interventions: Document food/fluid/supplement intake    Friction and Shear Interventions: Apply protective barrier, creams and emollients                Problem: Patient Education: Go to Patient Education Activity  Goal: Patient/Family Education  10/4/2021 1437 by [facility] Friday, RN  Outcome: Resolved/Met  10/4/2021 0731 by Ac Friday, RN  Outcome: Progressing Towards Goal     Problem: Delirium Treatment  Goal: *Level of consciousness restored to baseline  10/4/2021 1437 by [facility] Friday, RN  Outcome: Resolved/Met  10/4/2021 0731 by Ac Friday, RN  Outcome: Progressing Towards Goal  Goal: *Level of environmental perceptions restored to baseline  10/4/2021 1437 by Ac Friday, RN  Outcome: Resolved/Met  10/4/2021 0731 by Ac Friday, RN  Outcome: Progressing Towards Goal  Goal: *Sensory perception restored to baseline  10/4/2021 1437 by [facility] Friday, RN  Outcome: Resolved/Met  10/4/2021 0731 by Ac Friday, RN  Outcome: Progressing Towards Goal  Goal: *Emotional stability restored to baseline  10/4/2021 1437 by [facility] Friday, RN  Outcome: Resolved/Met  10/4/2021 0731 by Ac Friday, RN  Outcome: Progressing Towards Goal  Goal: *Functional assessment restored to baseline  10/4/2021 1437 by [facility] Friday, RN  Outcome: Resolved/Met  10/4/2021 0731 by Ac Friday, RN  Outcome: Progressing Towards Goal  Goal: *Absence of falls  10/4/2021 1437 by Ac Friday, RN  Outcome: Resolved/Met  10/4/2021 0731 by Ac Friday, RN  Outcome: Progressing Towards Goal  Goal: *Will remain free of delirium, CAM Score negative  10/4/2021 1437 by Beni Ross RN  Outcome: Resolved/Met  10/4/2021 0731 by Beni Ross RN  Outcome: Progressing Towards Goal  Goal: *Cognitive status will be restored to baseline  10/4/2021 1437 by Beni Ross RN  Outcome: Resolved/Met  10/4/2021 0731 by Beni Ross RN  Outcome: Progressing Towards Goal  Goal: Interventions  10/4/2021 1437 by Beni Ross RN  Outcome: Resolved/Met  10/4/2021 0731 by Beni Ross RN  Outcome: Progressing Towards Goal     Problem: Patient Education: Go to Patient Education Activity  Goal: Patient/Family Education  10/4/2021 1437 by Beni Ross RN  Outcome: Resolved/Met  10/4/2021 0731 by Beni Ross RN  Outcome: Progressing Towards Goal     Problem: Patient Education: Go to Patient Education Activity  Goal: Patient/Family Education  10/4/2021 1437 by Beni Ross RN  Outcome: Resolved/Met  10/4/2021 0731 by Beni Ross RN  Outcome: Progressing Towards Goal     Problem: Breathing Pattern - Ineffective  Goal: *Absence of hypoxia  10/4/2021 1437 by Beni Ross RN  Outcome: Resolved/Met  10/4/2021 0731 by Beni Ross RN  Outcome: Progressing Towards Goal  Goal: *Use of effective breathing techniques  10/4/2021 1437 by Beni Ross RN  Outcome: Resolved/Met  10/4/2021 0731 by Beni Ross RN  Outcome: Progressing Towards Goal  Goal: *PALLIATIVE CARE:  Alleviation of Dyspnea  10/4/2021 1437 by Beni Ross RN  Outcome: Resolved/Met  10/4/2021 0731 by Beni Ross RN  Outcome: Progressing Towards Goal     Problem: Patient Education: Go to Patient Education Activity  Goal: Patient/Family Education  10/4/2021 1437 by Beni Ross RN  Outcome: Resolved/Met  10/4/2021 0731 by Beni Ross RN  Outcome: Progressing Towards Goal

## 2021-10-04 NOTE — DISCHARGE INSTRUCTIONS
Patient Education        7 Tips for How to Whiteside When Things Feel Out of Control  When life feels chaotic or overwhelming, it can be easy to get stuck in a cycle of stress and worry. But there are things you can do to cope with the chaos and find some calm. Here are some tips. Be aware of your feelings. Try to note what you're feeling when you're feeling it, without judging it as \"good\" or \"bad. \" It might help to write down how you're feeling and why. Notice your mindset. The way you think about things really does affect the way you feel. If you tell yourself something is too hard or too stressful, it's going to feel that way. But if you tell yourself you can handle something hard, you're more likely to be able to. Focus on what you can control. Try not to focus on what you can't. Make a list of the things that cause you stress. Then decide which things on the list you can take action on and which you can't. This can remind you what's in your control and what isn't. Spend time doing things that are meaningful to you. For example, you could do projects with your kids, foster an animal, write postcards to friends, or do random acts of kindness for your neighbors. Do things that make you feel good or bring you cecilia. Look for sources of stress you can limit. Then take steps to limit them. That might mean turning off the news, staying away from social media, or even having less contact with certain people. Distract yourself. Spend time on a hobby or project. Call a friend. Start watching a new TV series. Whatever you decide, make sure it makes you happy and feels worth your time. Be sure your coping strategies are helpful. Find things to do that help calm and relax you. Reading for fun, taking a bath, or spending some time being mindful are the types of things that are more likely to reduce stress than add to it. But things like staying too busy might exhaust you or add stress.  A glass of wine or a beer in the evening may not be a bad thing for some people, but alcohol can make stress and anxiety worse. Just make sure that the things you're doing to cope are helping rather than hurting. Current as of: June 16, 2021               Content Version: 13.0  © 2006-2021 [employer]. Care instructions adapted under license by its learning (which disclaims liability or warranty for this information). If you have questions about a medical condition or this instruction, always ask your healthcare professional. [place] [address] any warranty or liability for your use of this information. Patient Education        COVID-19 Vaccine: Care Instructions  Overview     The COVID-19 vaccine can help you avoid getting COVID-19, a disease caused by a type of coronavirus. COVID-19 can cause pneumonia and even death. You may need 1 or 2 doses of the vaccine. And you might need \"booster\" doses later to help you stay protected. It takes two weeks after your last dose to be fully protected from COVID-19. The vaccine prevents most cases of COVID-19. But if you do still catch COVID-19, your symptoms will probably be less severe than if you hadn't gotten the vaccine. You can't get COVID-19 from the vaccine. What are the side effects of the COVID-19 vaccine? You might not have side effects. But if you do, they'll probably be like those of other vaccines, including:  · Fever. · Soreness. · Feeling very tired. This is normal. Your body is building protection against COVID-19. You may also have other side effects, including:  · Chills. · Headache. · Pain, redness, a rash, or swelling in the arm where you had the vaccine. · Swollen lymph nodes in the armpit of the arm where you had the vaccine. · Nausea. Side effects will likely go away in a few days. Until then, it may be harder to do your usual activities. You may need 1 or 2 doses.  If you get 2 doses, you may notice side effects more after the second dose. If you think you've been exposed to COVID-19 or have symptoms like a cough, trouble breathing, or a new loss of smell or taste, call your doctor. These aren't vaccine side effects. You may need a COVID-19 test.  Follow-up care is a key part of your treatment and safety. Be sure to make and go to all appointments, and call your doctor if you are having problems. It's also a good idea to know your test results and keep a list of the medicines you take. How can you care for yourself at home? · If you have a sore arm or a fever after getting the COVID-19 vaccine, you can take an over-the-counter pain medicine, such as acetaminophen (Tylenol) or ibuprofen (Advil, Motrin). Read and follow all instructions on the label. Do not give aspirin to anyone younger than 20. It has been linked to Reye syndrome, a serious illness. · Put ice or a cold pack on the sore area for 10 to 20 minutes at a time. Put a thin cloth between the ice and your skin. · If you have side effects, such as a fever, be sure to get enough rest and drink plenty of fluids. When should you call for help? Call 911  anytime you think you may need emergency care. For example, call if after getting the COVID-19 vaccine:    · You have symptoms of a severe reaction to the vaccine. Symptoms of a severe reaction may include:  ? Severe difficulty breathing. ? Sudden raised, red areas (hives) all over your body. ? Severe lightheadedness. Call your doctor now or seek immediate medical care if:    · You have one or more of these symptoms within several weeks of getting a COVID-19 vaccine. ? Severe headache that does not go away. ? Blurred vision. ? Shortness of breath. ? Chest pain, or a feeling of a fast-beating, fluttering, or pounding heart. ? Abdominal pain that does not go away. ? Pain, redness, or swelling in the leg.  ? Bruising or tiny spots under the skin that's not near where you got the vaccine. Watch closely for changes in your health, and be sure to contact your doctor if you have any problems. Where can you learn more? Go to http://www.gray.com/  Enter C126 in the search box to learn more about \"COVID-19 Vaccine: Care Instructions. \"  Current as of: July 1, 2021               Content Version: 13.0  © 3346-8677 [employer]. Care instructions adapted under license by [employer] (which disclaims liability or warranty for this information). If you have questions about a medical condition or this instruction, always ask your healthcare professional. [place] [address] any warranty or liability for your use of this information. Patient Education        Learning About Coronavirus [phone])  What is coronavirus (COVID-19)? COVID-19 is a disease caused by a type of coronavirus. This illness was first found in December 2019. It has since spread worldwide. Coronaviruses are a large group of viruses. They cause the common cold. They also cause more serious illnesses like [place] respiratory syndrome (MERS) and severe acute respiratory syndrome (SARS). COVID-19 is caused by a novel coronavirus. That means it's a new type that has not been seen in people before. What are the symptoms? COVID-19 symptoms may include:  · Fever. · Cough. · Trouble breathing. · Chills or repeated shaking with chills. · Muscle and body aches. · Headache. · Sore throat. · New loss of taste or smell. · Vomiting. · Diarrhea. In severe cases, COVID-19 can cause pneumonia and make it hard to breathe without help from a machine. It can cause death. How is it diagnosed? COVID-19 is diagnosed with a viral test. This may also be called a PCR test or antigen test. It looks for evidence of the virus in your breathing passages or lungs (respiratory system). The test is most often done on a sample from the nose, throat, or lungs.  It's sometimes done on a sample of saliva. One way a sample is collected is by putting a long swab into the back of your nose. How is it treated? Mild cases of COVID-19 can be treated at home. Serious cases need treatment in the hospital. Treatment may include medicines to reduce symptoms, plus breathing support such as oxygen therapy or a ventilator. Some people may be placed on their belly to help their oxygen levels. Treatments that may help people who have COVID-19 include:  Antiviral medicines. These medicines treat viral infections. Remdesivir is an example. Immune-based therapy. These medicines help the immune system fight COVID-19. Examples include monoclonal antibodies. Blood thinners. These medicines help prevent blood clots. People with severe illness are at risk for blood clots. How can you protect yourself and others? The best way to protect yourself from getting sick is to:  · Get vaccinated. · Avoid sick people. · If you are not fully vaccinated:  ? Wear a mask if you have to go to public areas. ? Avoid crowds and try to stay at least 6 feet away from other people. · Cover your mouth with a tissue when you cough or sneeze. · Wash your hands often, especially after you cough or sneeze. Use soap and water, and scrub for at least 20 seconds. If soap and water aren't available, use an alcohol-based hand . · Avoid touching your mouth, nose, and eyes. To help avoid spreading the virus to others:  · Get vaccinated. · Cover your mouth with a tissue when you cough or sneeze. · Wash your hands often, especially after you cough or sneeze. Use soap and water, and scrub for at least 20 seconds. If soap and water aren't available, use an alcohol-based hand . · If you have been exposed to the virus and are not fully vaccinated:  ? Stay home. Don't go to school, work, or public areas. And don't use public transportation, ride-shares, or taxis unless you have no choice.   ? Wear a mask if you have to go to public areas, like the pharmacy. · If you're sick:  ? Leave your home only if you need to get medical care. But call the doctor's office first so they know you're coming. And wear a mask. ? Wear a mask whenever you're around other people. ? Limit contact with pets and people in your home. If possible, stay in a separate bedroom and use a separate bathroom. ? Clean and disinfect your home every day. Use household  and disinfectant wipes or sprays. Take special care to clean things that you touch with your hands. How can you self-isolate when you have COVID-19? If you have COVID-19, there are things you can do to help avoid spreading the virus to others. · Limit contact with people in your home. If possible, stay in a separate bedroom and use a separate bathroom. · Wear a mask when you are around other people. · If you have to leave home, avoid crowds and try to stay at least 6 feet away from other people. · Avoid contact with pets and other animals. · Cover your mouth and nose with a tissue when you cough or sneeze. Then throw it in the trash right away. · Wash your hands often, especially after you cough or sneeze. Use soap and water, and scrub for at least 20 seconds. If soap and water aren't available, use an alcohol-based hand . · Don't share personal household items. These include bedding, towels, cups and glasses, and eating utensils. · [address] in the warmest water allowed for the fabric type, and dry it completely. It's okay to wash other people's laundry with yours. · Clean and disinfect your home. Use household  and disinfectant wipes or sprays. When should you call for help? Call 911 anytime you think you may need emergency care. For example, call if you have life-threatening symptoms, such as:    · You have severe trouble breathing.  (You can't talk at all.)     · You have constant chest pain or pressure.     · You are severely dizzy or lightheaded.     · You are confused or can't think clearly.     · You have pale, gray, or blue-colored skin or lips.     · You pass out (lose consciousness) or are very hard to wake up. Call your doctor now or seek immediate medical care if:    · You have moderate trouble breathing. (You can't speak a full sentence.)     · You are coughing up blood (more than about 1 teaspoon).     · You have signs of low blood pressure. These include feeling lightheaded; being too weak to stand; and having cold, pale, clammy skin. Watch closely for changes in your health, and be sure to contact your doctor if:    · Your symptoms get worse.     · You are not getting better as expected.     · You have new or worse symptoms of anxiety, depression, nightmares, or flashbacks. Call before you go to the doctor's office. Follow their instructions. And wear a mask. Current as of: July 1, 2021               Content Version: 13.0  © 2006-2021 [employer]. Care instructions adapted under license by [employer] (which disclaims liability or warranty for this information). If you have questions about a medical condition or this instruction, always ask your healthcare professional. [address] any warranty or liability for your use of this information. DISCHARGE SUMMARY from Nurse    PATIENT INSTRUCTIONS:    After general anesthesia or intravenous sedation, for 24 hours or while taking prescription Narcotics:  · Limit your activities  · Do not drive and operate hazardous machinery  · Do not make important personal or business decisions  · Do  not drink alcoholic beverages  · If you have not urinated within 8 hours after discharge, please contact your surgeon on call. What to do at Home:  Recommended activity: Activity as tolerated.     If you experience any of the following symptoms worsening cough or wheezing, shortness of breath or fatigue not relieved with rest, unrelieved pain, nausea or vomiting please follow up with MD.    *  Please give a list of your current medications to your Primary Care Provider. *  Please update this list whenever your medications are discontinued, doses are      changed, or new medications (including over-the-counter products) are added. *  Please carry medication information at all times in case of emergency situations. These are general instructions for a healthy lifestyle:    No smoking/ No tobacco products/ Avoid exposure to second hand smoke  Surgeon General's Warning:  Quitting smoking now greatly reduces serious risk to your health. Obesity, smoking, and sedentary lifestyle greatly increases your risk for illness    A healthy diet, regular physical exercise & weight monitoring are important for maintaining a healthy lifestyle    You may be retaining fluid if you have a history of heart failure or if you experience any of the following symptoms:  Weight gain of 3 pounds or more overnight or 5 pounds in a week, increased swelling in our hands or feet or shortness of breath while lying flat in bed. Please call your doctor as soon as you notice any of these symptoms; do not wait until your next office visit. The discharge information has been reviewed with the patient. The patient verbalized understanding. Discharge medications reviewed with the patient and appropriate educational materials and side effects teaching were provided. Advance Care Planning  People with COVID-19 may have no symptoms, mild symptoms, such as fever, cough, and shortness of breath or they may have more severe illness, developing severe and fatal pneumonia. As a result, Advance Care Planning with attention to naming a health care decision maker (someone you trust to make healthcare decisions for you if you could not speak for yourself) and sharing other health care preferences is important BEFORE a possible health crisis.  Please contact your Primary Care Provider to discuss Advance Care Planning. Preventing the Spread of Coronavirus Disease 2019 in Homes and Residential Communities  For the most recent information go to [email or web address]    Prevention steps for People with confirmed or suspected COVID-19 (including persons under investigation) who do not need to be hospitalized  and   People with confirmed COVID-19 who were hospitalized and determined to be medically stable to go home    Your healthcare provider and public health staff will evaluate whether you can be cared for at home. If it is determined that you do not need to be hospitalized and can be isolated at home, you will be monitored by staff from your local or state health department. You should follow the prevention steps below until a healthcare provider or local or state health department says you can return to your normal activities. Stay home except to get medical care  People who are mildly ill with COVID-19 are able to isolate at home during their illness. You should restrict activities outside your home, except for getting medical care. Do not go to work, school, or public areas. Avoid using public transportation, ride-sharing, or taxis. Separate yourself from other people and animals in your home  People: As much as possible, you should stay in a specific room and away from other people in your home. Also, you should use a separate bathroom, if available. Animals: You should restrict contact with pets and other animals while you are sick with COVID-19, just like you would around other people. Although there have not been reports of pets or other animals becoming sick with COVID-19, it is still recommended that people sick with COVID-19 limit contact with animals until more information is known about the virus. When possible, have another member of your household care for your animals while you are sick.  If you are sick with COVID-19, avoid contact with your pet, including petting, snuggling, being kissed or licked, and sharing food. If you must care for your pet or be around animals while you are sick, wash your hands before and after you interact with pets and wear a facemask. Call ahead before visiting your doctor  If you have a medical appointment, call the healthcare provider and tell them that you have or may have COVID-19. This will help the healthcare providers office take steps to keep other people from getting infected or exposed. Wear a facemask  You should wear a facemask when you are around other people (e.g., sharing a room or vehicle) or pets and before you enter a healthcare providers office. If you are not able to wear a facemask (for example, because it causes trouble breathing), then people who live with you should not stay in the same room with you, or they should wear a facemask if they enter your room. Cover your coughs and sneezes  Cover your mouth and nose with a tissue when you cough or sneeze. Throw used tissues in a lined trash can. Immediately wash your hands with soap and water for at least 20 seconds or, if soap and water are not available, clean your hands with an alcohol-based hand  that contains at least 60% alcohol. Clean your hands often  Wash your hands often with soap and water for at least 20 seconds, especially after blowing your nose, coughing, or sneezing; going to the bathroom; and before eating or preparing food. If soap and water are not readily available, use an alcohol-based hand  with at least 60% alcohol, covering all surfaces of your hands and rubbing them together until they feel dry. Soap and water are the best option if hands are visibly dirty. Avoid touching your eyes, nose, and mouth with unwashed hands.   Avoid sharing personal household items  You should not share dishes, drinking glasses, cups, eating utensils, towels, or bedding with other people or pets in your home. After using these items, they should be washed thoroughly with soap and water. Clean all high-touch surfaces everyday  High touch surfaces include counters, tabletops, doorknobs, bathroom fixtures, toilets, phones, keyboards, tablets, and bedside tables. Also, clean any surfaces that may have blood, stool, or body fluids on them. Use a household cleaning spray or wipe, according to the label instructions. Labels contain instructions for safe and effective use of the cleaning product including precautions you should take when applying the product, such as wearing gloves and making sure you have good ventilation during use of the product. Monitor your symptoms  Seek prompt medical attention if your illness is worsening (e.g., difficulty breathing). Before seeking care, call your healthcare provider and tell them that you have, or are being evaluated for, COVID-19. Put on a facemask before you enter the facility. These steps will help the healthcare providers office to keep other people in the office or waiting room from getting infected or exposed. Ask your healthcare provider to call the local or [facility] health department. Persons who are placed under active monitoring or facilitated self-monitoring should follow instructions provided by their local health department or occupational health professionals, as appropriate. When working with your local health department check their available hours. If you have a medical emergency and need to call 911, notify the dispatch personnel that you have, or are being evaluated for COVID-19. If possible, put on a facemask before emergency medical services arrive. Discontinuing home isolation  Patients with confirmed COVID-19 should remain under home isolation precautions until the risk of secondary transmission to others is thought to be low.  The decision to discontinue home isolation precautions should be made on a case-by-case basis, in consultation with healthcare providers and state and local health departments. Patient Education        Pneumonia: Care Instructions  Overview     Pneumonia is an infection of the lungs. Most cases are caused by infections from bacteria or viruses. Pneumonia may be mild or very severe. If it is caused by bacteria, you will be treated with antibiotics. It may take a few weeks to a few months to recover fully from pneumonia, depending on how sick you were and whether your overall health is good. Follow-up care is a key part of your treatment and safety. Be sure to make and go to all appointments, and call your doctor if you are having problems. It's also a good idea to know your test results and keep a list of the medicines you take. How can you care for yourself at home? · Take your antibiotics exactly as directed. Do not stop taking the medicine just because you are feeling better. You need to take the full course of antibiotics. · Take your medicines exactly as prescribed. Call your doctor if you think you are having a problem with your medicine. · Get plenty of rest and sleep. You may feel weak and tired for a while, but your energy level will improve with time. · To prevent dehydration, drink plenty of fluids. Choose water and other clear liquids. If you have kidney, heart, or liver disease and have to limit fluids, talk with your doctor before you increase the amount of fluids you drink. · Take care of your cough so you can rest. A cough that brings up mucus from your lungs is common with pneumonia. It is one way your body gets rid of the infection. But if coughing keeps you from resting or causes severe fatigue and chest-wall pain, talk to your doctor. Your doctor may suggest that you take a medicine to reduce the cough. · Use a vaporizer or humidifier to add moisture to your bedroom. Follow the directions for cleaning the machine. · Do not smoke or allow others to smoke around you.  Smoke will make your cough last longer. If you need help quitting, talk to your doctor about stop-smoking programs and medicines. These can increase your chances of quitting for good. · Take an over-the-counter pain medicine, such as acetaminophen (Tylenol), ibuprofen (Advil, Motrin), or naproxen (Aleve). Read and follow all instructions on the label. · Do not take two or more pain medicines at the same time unless the doctor told you to. Many pain medicines have acetaminophen, which is Tylenol. Too much acetaminophen (Tylenol) can be harmful. · If you were given a spirometer to measure how well your lungs are working, use it as instructed. This can help your doctor tell how your recovery is going. · To prevent pneumonia in the future, talk to your doctor about getting a flu vaccine (once a year) and a pneumococcal vaccine (one time only for most people). When should you call for help? Call 911 anytime you think you may need emergency care. For example, call if:    · You have severe trouble breathing. Call your doctor now or seek immediate medical care if:    · You cough up dark brown or bloody mucus (sputum).     · You have new or worse trouble breathing.     · You are dizzy or lightheaded, or you feel like you may faint. Watch closely for changes in your health, and be sure to contact your doctor if:    · You have a new or higher fever.     · You are coughing more deeply or more often.     · You are not getting better after 2 days (48 hours).     · You do not get better as expected. Where can you learn more? Go to http://www."[employer].com/  Enter D336 in the search box to learn more about \"Pneumonia: Care Instructions. \"  Current as of: July 6, 2021               Content Version: 13.0  © 5371-5693 Healthwise, Incorporated. Care instructions adapted under license by [employer] (which disclaims liability or warranty for this information).  If you have questions about a medical condition or this instruction, always ask your healthcare professional. [place] [address] any warranty or liability for your use of this information. Patient Education        Learning About Hypoxemia  What is hypoxemia? Hypoxemia means that you don't have enough oxygen in your blood. It's a result of diseases that affect your heart or lungs. These include heart failure, COPD, and pulmonary fibrosis (scarring of the lungs). Being at high altitudes can also lead to hypoxemia. What happens when you have hypoxemia? Oxygen gets into your blood through your lungs. Your blood carries the oxygen to all parts of your body. When you have too little oxygen in your blood, your body doesn't get enough of it. With too little oxygen, your heart and other parts of your body don't work very well. What are the symptoms? In addition to the symptoms of whatever is causing your hypoxemia, you may:  · Get tired quickly. · Be short of breath when you are active. · Feel like your heart is pounding or racing. · Feel weak or dizzy. · Become confused. How is hypoxemia treated? Your doctor will do tests to find out how much oxygen is in your blood. He or she will look for the cause of your hypoxemia and treat that problem. For example, if you have heart failure, you may need medicines that help your heart pump better. · If your hypoxemia is not severe, your doctor may give you oxygen through a mask or nasal cannula (say \"TIARA-yuh-geno\"). A cannula is a thin tube with two openings that fit just inside your nose. · If your hypoxemia is severe, you may have a breathing tube put into your windpipe. The breathing tube is attached to a machine that pushes air into your lungs. This machine is called a ventilator. · If you have a long-term problem with hypoxemia, your doctor may recommend that you use oxygen regularly. Some people need it all the time. Others need it from time to time throughout the day or overnight.  Your doctor will tell you how much oxygen you need and how often to use it. Follow-up care is a key part of your treatment and safety. Be sure to make and go to all appointments, and call your doctor if you are having problems. It's also a good idea to know your test results and keep a list of the medicines you take. Where can you learn more? Go to http://www.[employer].com/  Enter M375 in the search box to learn more about \"Learning About Hypoxemia. \"  Current as of: July 6, 2021               Content Version: 13.0  © 4905-9206 Healthwise, Incorporated. Care instructions adapted under license by [employer] (which disclaims liability or warranty for this information). If you have questions about a medical condition or this instruction, always ask your healthcare professional. [address] any warranty or liability for your use of this information.          ___________________________________________________________________________________________________________________________________

## 2021-10-04 NOTE — PROGRESS NOTES
Patient has slept well throughout the night  Patient had 1 episode when going to restroom O2 saturation dropped into the high 80's however was able to recover quickly  Since episode  RR remained even/unlabored on 6LNCHF  With NAD noted  Patient is sleeping in bed with call light within reach  Will give BSR to oncoming RN upon arrival 7a-7p

## 2021-10-04 NOTE — PROGRESS NOTES
Oximetry alarming. O2 at 84%. Patient standing, urinating. Increased to [facility] for O2 88%. Will continue to monitor.

## 2021-10-04 NOTE — PROGRESS NOTES
Problem: Falls - Risk of  Goal: *Absence of Falls  Description: Document Emiliano Bare Fall Risk and appropriate interventions in the flowsheet. Outcome: Progressing Towards Goal  Note: Fall Risk Interventions:  Mobility Interventions: Bed/chair exit alarm, Patient to call before getting OOB    Mentation Interventions: Adequate sleep, hydration, pain control, Bed/chair exit alarm    Medication Interventions: Bed/chair exit alarm, Patient to call before getting OOB, Teach patient to arise slowly    Elimination Interventions: Call light in reach, Bed/chair exit alarm              Problem: Patient Education: Go to Patient Education Activity  Goal: Patient/Family Education  Outcome: Progressing Towards Goal     Problem: Pressure Injury - Risk of  Goal: *Prevention of pressure injury  Description: Document Jai Scale and appropriate interventions in the flowsheet.   Outcome: Progressing Towards Goal  Note: Pressure Injury Interventions:  Sensory Interventions: Assess changes in LOC    Moisture Interventions: Minimize layers    Activity Interventions: Pressure redistribution bed/mattress(bed type)    Mobility Interventions: Pressure redistribution bed/mattress (bed type)    Nutrition Interventions: Document food/fluid/supplement intake    Friction and Shear Interventions: Apply protective barrier, creams and emollients                Problem: Patient Education: Go to Patient Education Activity  Goal: Patient/Family Education  Outcome: Progressing Towards Goal     Problem: Delirium Treatment  Goal: *Level of consciousness restored to baseline  Outcome: Progressing Towards Goal  Goal: *Level of environmental perceptions restored to baseline  Outcome: Progressing Towards Goal  Goal: *Sensory perception restored to baseline  Outcome: Progressing Towards Goal  Goal: *Emotional stability restored to baseline  Outcome: Progressing Towards Goal  Goal: *Functional assessment restored to baseline  Outcome: Progressing Towards Goal  Goal: *Absence of falls  Outcome: Progressing Towards Goal  Goal: *Will remain free of delirium, CAM Score negative  Outcome: Progressing Towards Goal  Goal: *Cognitive status will be restored to baseline  Outcome: Progressing Towards Goal  Goal: Interventions  Outcome: Progressing Towards Goal     Problem: Patient Education: Go to Patient Education Activity  Goal: Patient/Family Education  Outcome: Progressing Towards Goal     Problem: Patient Education: Go to Patient Education Activity  Goal: Patient/Family Education  Outcome: Progressing Towards Goal     Problem: Breathing Pattern - Ineffective  Goal: *Absence of hypoxia  Outcome: Progressing Towards Goal  Goal: *Use of effective breathing techniques  Outcome: Progressing Towards Goal  Goal: *PALLIATIVE CARE:  Alleviation of Dyspnea  Outcome: Progressing Towards Goal     Problem: Patient Education: Go to Patient Education Activity  Goal: Patient/Family Education  Outcome: Progressing Towards Goal

## 2021-10-04 NOTE — DISCHARGE SUMMARY
Hospitalist Discharge Summary   Admit Date:  2021  1:19 PM   DC Note date: 10/4/2021  Name:  Nicole Duke   Age:  42 y.o. Sex:  male  :  1980   MRN:  [id number]   Room:  [facility]  PCP:  William Glez MD    Presenting Complaint: Respiratory Distress    Initial Admission Diagnosis: Acute hypoxemic respiratory failure due to COVID-19 ([facility] [address] [U07.1, J96.01]     Problem List for this Hospitalization:  Hospital Problems as of 10/4/2021 Date Reviewed: 2021        Codes Class Noted - Resolved POA    Leukopenia ICD-10-CM: D72.819  ICD-9-CM: 288.50  2021 - Present Unknown        Pneumonia due to COVID-19 virus ICD-10-CM: U07.1, J12.82  ICD-9-CM: 480.8, 079.89  2021 - Present Unknown        * (Principal) Acute hypoxemic respiratory failure due to COVID-19 [facility]) ICD-10-CM: U07.1, J96.01  ICD-9-CM: 518.81, 079.89, 799.02  2021 - Present Unknown        RESOLVED: COVID-19 ICD-10-CM: U07.1  ICD-9-CM: 079.89  2021 - 2021 Unknown        RESOLVED: Acute respiratory failure with hypoxia (HCC) ICD-10-CM: J96.01  ICD-9-CM: 518.81  2021 - 2021 Unknown        RESOLVED: Shortness of breath ICD-10-CM: R06.02  ICD-9-CM: 786.05  2021 - 2021 Unknown            Did Patient have Sepsis (YES OR NO): NO    Acute hypoxemic respiratory failure POA secondary to COVID-19 pneumonia POA  COVID-19 pneumonia POA  Leukopenia resolved    Hospital Course: This is a 40-year-old male with no prior significant past medical history presented to the ER with worsening shortness of breath for almost 2 weeks prior to presentation. He tested positive for Covid on 9/10/2021. When EMS arrived, they were unable to  his oxygen saturation at home and was placed on 15 L oxygen with improvement of sats in the 80s. Chest x-ray reveals bilateral atypical pneumonia. CRP on admission 9.1. Patient is not vaccinated for COVID-19.   He was initially admitted to the ICU on BiPAP and subsequently transitioned to [place] [address]. He was transferred to medical floor on 9/28. He received Tocilizumab on 9/20/2021. He is out of the window for remdesivir as symptoms have been present for more than 2 weeks prior to admission. He received dexamethasone currently on 5 mg daily. After patient was transferred to medical floor,  hospitalist was consulted to assume care. Patient was treated with dexamethasone. He received an Tocilizumab on admission as he met criteria with elevated CRP and needing AirVo/BiPAP. His oxygen requirements were subsequently weaned as tolerated to keep oxygen saturations greater than 88%. His respiratory status significantly improved. He had a home oxygen screen done prior to discharge and patient qualified for 1 L oxygen nasal cannula. He is discharged home today in a stable condition to follow-up with primary care physician in 3 to 5 days. Disposition: Home or Self Care  Diet: ADULT ORAL NUTRITION SUPPLEMENT Lunch; Standard High Calorie/High Protein  ADULT DIET Regular; DOUBLE PORTIONS PLEASE  Code Status: Full Code    Follow Up Orders: Follow-up Appointments   Procedures    FOLLOW UP VISIT Appointment in: 3 - 5 Days F/U WITH PCP IN 3-5 DAYS     F/U WITH PCP IN 3-5 DAYS     Standing Status:   Standing     Number of Occurrences:   1     Order Specific Question:   Appointment in     Answer:   3 - 5 Days       Follow-up Information     Follow up With Specialties Details Why Contact Info    Momo Darden MD Family Medicine   [address] [place] [address] Dr  [phone]            Suggested initial follow up labs/diagnostics (ultimately defer to outpatient provider): CBC, BMP at PCP visit. Time spent in patient discharge and coordination 41 minutes. Plan was discussed with the pt. All questions answered. Patient was stable at time of discharge. Instructions given to call a physician or return if any concerns.     Discharge Info:   Current Discharge Medication List      START taking these medications    Details   albuterol (PROVENTIL HFA, VENTOLIN HFA, PROAIR HFA) 90 mcg/actuation inhaler Take 2 Puffs by inhalation every four (4) hours as needed for Wheezing or Shortness of Breath for up to 15 days. Qty: 1 Each, Refills: 0  Start date: 10/4/2021, End date: 10/19/2021      guaiFENesin-dextromethorphan (ROBITUSSIN DM) 100-10 mg/5 mL syrup Take 10 mL by mouth every four (4) hours as needed for Cough or Congestion (may give concurrently with hycodan, if ordered) for up to 10 days. Qty: 1 Each, Refills: 0  Start date: 10/4/2021, End date: 10/14/2021             Procedures done this admission:  * No surgery found *    Consults this admission:  IP CONSULT TO HOSPITALIST    Echocardiogram/EKG results:  No results found for this or any previous visit. EKG Results     Procedure 720 Value Units Date/Time    EKG, 12 LEAD, INITIAL [[id number]] Collected: 09/20/21 1329    Order Status: Completed Updated: 09/20/21 1505     Ventricular Rate 110 BPM      Atrial Rate 288 BPM      QRS Duration 92 ms      Q-T Interval 302 ms      QTC Calculation (Bezet) 408 ms      Calculated R Axis 39 degrees      Calculated T Axis 31 degrees      Diagnosis --     !!! Poor data quality, interpretation may be adversely affected  Sinus tachycardia  Nonspecific T wave abnormality  Abnormal ECG    Confirmed by ST ELVIE HO MD (), LUCIO ROBERT ([id number]) on 9/20/2021 3:05:31 PM            Diagnostic Imaging/Tests:   XR CHEST SNGL V    Result Date: 9/30/2021  History: Worsening hypoxia Exam: portable chest Comparison: 9/25/2021 Findings: Bilateral lower lobe infiltrates again seen. There is also a right upper lobe infiltrate. No change in the positioning of the right-sided PICC line. No pneumothorax. No change in the appearance of the mediastinal contour or osseous structures. IMPRESSIONs: Stable exam with bilateral lower lobe infiltrates. There is also a right upper lobe infiltrate.      XR CHEST SNGL V    Result Date: 9/25/2021  EXAM: Single view chest radiograph. INDICATION: Covid 19 positive, shortness of breath. COMPARISON: Chest radiograph dated September 23, 2021. FINDINGS: Unchanged bibasilar heterogeneous airspace opacities. Right upper extremity PICC terminating in the distal SVC. Heart is normal in size. No acute osseous abnormality. 1. Unchanged bibasilar heterogeneous airspace opacities. XR CHEST SNGL V    Result Date: 9/23/2021  Exam: CHEST SINGLE VW on 9/23/2021 6:26 PM Clinical History: The male patient is 42years old  presenting for increasing difficulty breathing. Comparison:  Chest x-ray 9/23/2021 Findings:  Frontal view of the chest was obtained. There are increasing bibasilar pulmonary infiltrates. The upper lobes remain relatively clear. No pleural effusions are demonstrated. The cardiomediastinal silhouette is within normal limits. There are no acute osseous abnormalities. 1. Increasing bibasilar infiltrates. CPT code(s) 41030     XR CHEST SNGL V    Result Date: 9/23/2021  EXAMINATION: One view chest HISTORY: Resp failure due to COVID TECHNIQUE: Frontal chest. COMPARISON: 9/22/2021 FINDINGS:  Persistent bilateral lung infiltrates. There is no significant pneumothorax. There is no pleural effusion. The heart is unchanged. No other significant interval changes. 1. Findings as described above. XR CHEST SNGL V    Result Date: 9/22/2021  EXAMINATION: One view chest HISTORY: Resp failure due to COVID TECHNIQUE: Frontal chest. COMPARISON: 9/21/2021 FINDINGS:  Persistent bibasilar lung infiltrates. There is no significant pneumothorax. There is no pleural effusion. The heart is unchanged. No other significant interval changes. 1. Findings as described above. XR CHEST SNGL V    Result Date: 9/21/2021  EXAMINATION: One view chest HISTORY: Resp failure due to COVID TECHNIQUE: Frontal chest. COMPARISON: 9/20/2021 FINDINGS:  Persistent bilateral lung infiltrates. There is no significant pneumothorax. There is no pleural effusion. The heart is unchanged. No other significant interval changes. 1. Findings as described above. XR CHEST SNGL V    Result Date: 9/20/2021  Portable chest x-ray CLINICAL INDICATION: Respiratory distress, Covid 19 positive FINDINGS: Single AP view of the chest submitted without comparison shows patchy airspace consolidation in the mid and lower lungs without a definite pleural effusion. The cardiac silhouette and mediastinum are unremarkable. There is no pneumothorax. Bilateral atypical pneumonia. All Micro Results     Procedure Component Value Units Date/Time    CULTURE, RESPIRATORY/SPUTUM/BRONCH Charo Hickey [[id number]] Collected: 10/01/21 1010    Order Status: Completed Specimen: Sputum Updated: 10/03/21 0757     Special Requests: NO SPECIAL REQUESTS        GRAM STAIN 0 TO 12 WBCS SEEN PER OIF      0 TO 3 EPITHELIAL CELLS SEEN PER OIF      MANY GRAM POSITIVE COCCI               MODERATE GRAM NEGATIVE COCCI            FEW GRAM NEGATIVE RODS         FEW GRAM POSITIVE RODS        Culture result:       MODERATE NORMAL RESPIRATORY ELOY                Labs: Results:       BMP, Mg, Phos Recent Labs     10/04/21  0603      K 4.2      CO2 30   AGAP 7   BUN 21   CREA 0.93   CA 9.1   GLU 90   MG 2.3   PHOS 5.5*      CBC No results for input(s): WBC, RBC, HGB, HCT, PLT, GRANS, LYMPH, EOS, MONOS, BASOS, IG, ANEU, ABL, GLO, ABM, ABB, AIG, HGBEXT, HCTEXT, PLTEXT in the last 72 hours. LFT No results for input(s): ALT, TBIL, AP, TP, ALB, GLOB, AGRAT in the last 72 hours.     No lab exists for component: SGOT, GPT   Cardiac Testing No results found for: BNPP, BNP, CPK, RCK1, RCK2, RCK3, RCK4, CKMB, CKNDX, CKND1, TROPT, TROIQ   Coagulation Tests Lab Results   Component Value Date/Time    Prothrombin time 13.3 09/20/2021 01:29 PM    INR 1.0 09/20/2021 01:29 PM      A1c No results found for: HBA1C, UYL2ZUEX   Lipid Panel Lab Results Component Value Date/Time    Triglyceride 257 (H) 09/25/2021 05:14 AM      Thyroid Panel Lab Results   Component Value Date/Time    TSH 0.114 (L) 09/20/2021 01:30 PM    T4, Free 1.7 (H) 09/20/2021 01:29 PM        Most Recent UA No results found for: COLOR, APPRN, REFSG, BAILEE, PROTU, GLUCU, KETU, BILU, BLDU, UROU, ABDULKADIR, LEUKU, WBCU, RBCU, UEPI, BACTU, CASTS, UCRY, MUCUS, UCOM       All Labs from Last 24 Hrs:  Recent Results (from the past 24 hour(s))   MAGNESIUM    Collection Time: 10/04/21  6:03 AM   Result Value Ref Range    Magnesium 2.3 1.8 - 2.4 mg/dL   PHOSPHORUS    Collection Time: 10/04/21  6:03 AM   Result Value Ref Range    Phosphorus 5.5 (H) 2.5 - 4.5 MG/DL   METABOLIC PANEL, BASIC    Collection Time: 10/04/21  6:03 AM   Result Value Ref Range    Sodium 141 136 - 145 mmol/L    Potassium 4.2 3.5 - 5.1 mmol/L    Chloride 104 98 - 107 mmol/L    CO2 30 21 - 32 mmol/L    Anion gap 7 7 - 16 mmol/L    Glucose 90 65 - 100 mg/dL    BUN 21 6 - 23 MG/DL    Creatinine 0.93 0.8 - 1.5 MG/DL    GFR est AA >60 >60 ml/min/1.73m2    GFR est non-AA >60 >60 ml/min/1.73m2    Calcium 9.1 8.3 - 10.4 MG/DL       Current Med List in Hospital:   Current Facility-Administered Medications   Medication Dose Route Frequency    fluticasone propionate (FLONASE) 50 mcg/actuation nasal spray 2 Spray  2 Spray Both Nostrils DAILY    sodium chloride (OCEAN) 0.65 % nasal squeeze bottle 2 Spray  2 Spray Both Nostrils TID    guaiFENesin-dextromethorphan (ROBITUSSIN DM) 100-10 mg/5 mL syrup 10 mL  10 mL Oral Q4H PRN    guaiFENesin ER (MUCINEX) tablet 600 mg  600 mg Oral Q12H    albuterol (PROVENTIL HFA, VENTOLIN HFA, PROAIR HFA) inhaler 2 Puff  2 Puff Inhalation Q4H PRN    famotidine (PEPCID) tablet 20 mg  20 mg Oral BID    melatonin tablet 5 mg  5 mg Oral QHS    zolpidem (AMBIEN) tablet 5 mg  5 mg Oral QHS PRN    phenol throat spray (CHLORASEPTIC) 1 Spray  1 Spray Oral PRN    HYDROcodone-acetaminophen (NORCO) 7.5-325 mg per tablet 1 Tablet  1 Tablet Oral Q6H    NUTRITIONAL SUPPORT ELECTROLYTE PRN ORDERS   Does Not Apply PRN    central line flush (saline) syringe 10 mL  10 mL InterCATHeter Q8H    LORazepam (ATIVAN) injection 1 mg  1 mg IntraVENous Q6H PRN    sodium chloride (NS) flush 5-40 mL  5-40 mL IntraVENous Q8H    sodium chloride (NS) flush 5-40 mL  5-40 mL IntraVENous Q8H    sodium chloride (NS) flush 5-40 mL  5-40 mL IntraVENous PRN    acetaminophen (TYLENOL) tablet 650 mg  650 mg Oral Q4H PRN    bisacodyL (DULCOLAX) suppository 10 mg  10 mg Rectal DAILY PRN    enoxaparin (LOVENOX) injection 40 mg  40 mg SubCUTAneous Q12H    morphine injection 2 mg  2 mg IntraVENous Q4H PRN       No Known Allergies    There is no immunization history on file for this patient.     Recent Vital Data:  Patient Vitals for the past 24 hrs:   Temp Pulse Resp BP SpO2   10/04/21 1118     92 %   10/04/21 1107 98.4 °F (36.9 °C) 83 18 112/72 93 %   10/04/21 0914     91 %   10/04/21 0905     93 %   10/04/21 0751 97.9 °F (36.6 °C) 85 18 104/63 96 %   10/04/21 0328 97.9 °F (36.6 °C) 88 18 105/67 94 %   10/04/21 0004 97.6 °F (36.4 °C) 80 14 116/84 96 %   10/03/21 1959     92 %   10/03/21 1934 98.1 °F (36.7 °C) 82 16 110/76 95 %   10/03/21 1610 97.8 °F (36.6 °C) 79 18 111/69 96 %   10/03/21 1458     91 %     Oxygen Therapy  O2 Sat (%): 92 % (10/04/21 1118)  Pulse via Oximetry: 77 beats per minute (10/03/21 1458)  O2 Device: Nasal cannula (10/04/21 1220)  Skin Assessment: Clean, dry, & intact (09/28/21 0715)  Skin Protection for O2 Device: Yes (09/27/21 0745)  Orientation: Middle (09/27/21 0745)  Location: Other (Comment) (nasal bridge) (09/27/21 0750)  Interventions: Mouth Care;Reposition Device (09/27/21 1600)  O2 Flow Rate (L/min): 2 l/min (10/04/21 1220)  O2 Temperature: 87.8 °F (31 °C) (10/02/21 0852)  FIO2 (%): 40 % (weaned to 5L NC) (10/02/21 7050)    Estimated body mass index is 26.07 kg/m² as calculated from the following:    Height as of this encounter: 6' (1.829 m). Weight as of this encounter: 87.2 kg (192 lb 3.2 oz). Intake/Output Summary (Last 24 hours) at 10/4/2021 1315  Last data filed at 10/4/2021 0914  Gross per 24 hour   Intake 1220 ml   Output 2250 ml   Net -1030 ml         Physical Exam:    General:    Well nourished. No overt distress  Head:  Normocephalic, atraumatic  Eyes:  Sclerae appear normal.  Pupils equally round. HENT:  Nares appear normal, no drainage. Moist mucous membranes  Neck:  No restricted ROM. Trachea midline  CV:   RRR. No m/r/g. No JVD  Lungs:   CTAB. No wheezing, rhonchi, or rales. Even, unlabored  Abdomen:   Soft, nontender, nondistended. Extremities: Warm and dry. No cyanosis or clubbing. No edema. Skin:     No rashes. Normal coloration  Neuro:  Cranial nerves II-XII grossly intact. Psych:  Normal mood and affect. Signed:  Melva Oh MD    Part of this note may have been written by using a voice dictation software. The note has been proof read but may still contain some grammatical/other typographical errors.

## 2021-10-04 NOTE — PROGRESS NOTES
ACUTE OT GOALS:  (Developed with and agreed upon by patient and/or caregiver.)  1) Patient will complete lower body bathing and dressing with MOD I and adaptive equipment as needed. 2) Patient will complete toileting with MOD I.   3) Patient will complete functional transfers with MOD I and adaptive equipment as needed. 4) Patient will tolerate at least 30 minutes of OT activity with 1-2 rest breaks while maintaining O2 sats >90%. 5) Patient will verbalize at least 3 energy conservation technique to utilize during ADL/IADL. Timeframe: 7 visits       OCCUPATIONAL THERAPY: Daily Note OT Treatment Day # 3    Lisa Pham is a 42 y.o. male   PRIMARY DIAGNOSIS: Acute hypoxemic respiratory failure due to COVID-19 [facility])  [facility] hypox[facility] [U07.1, J96.01]       Reason for Referral:   ICD-10: Treatment Diagnosis: Generalized Muscle Weakness (M62.81)  INPATIENT: Payor: BLUE CROSS / Plan: [place] BLUE [facility] / Product Type: PPO /   ASSESSMENT:     REHAB RECOMMENDATIONS:   Recommendation to date pending progress:  [address] [place] [address] Therapy  Equipment:    To Be Determined     PRIOR LEVEL OF FUNCTION:  (Prior to Hospitalization)  INITIAL/CURRENT LEVEL OF FUNCTION:  (Based on most recently demonstrated )   Bathing:   Independent  Dressing:   Independent  Feeding/Grooming:   Independent  Toileting:   Independent  Functional Mobility:   Independent Bathing:   Not tested  Dressing:   Not tested  Feeding/Grooming:   Not tested  Toileting:   Not tested  Functional Mobility:   Not tested     ASSESSMENT:  Mr. Jeramie Vásquez presents with deficits in overall strength, activity tolerance, ADL performance and functional mobility. Pt was sitting in chair. Pt had been working with PT. Pt completed the exercises below on B UE's. Pt is on 1 L of O2 and it stayed above 90. Continue POC.       SUBJECTIVE:   Mr. Jeramie Vásquez states, \"I am doing much better. \"    SOCIAL HISTORY/LIVING ENVIRONMENT: Lives with spouse and family in a 2-story home with living accommodations on 1st level. 8 steps to enter with L sided hand rail. Independent at baseline for ADLs and functional mobility. 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 Support Systems: Spouse/Significant Other    OBJECTIVE:     PAIN: VITAL SIGNS: LINES/DRAINS:   Pre Treatment: Pain Screen  Pain Scale 1: Numeric (0 - 10)  Pain Intensity 1: 0  Post Treatment: 0   none  O2 Device: Nasal cannula       ACTIVITIES OF DAILY LIVING: I Mod I S SBA CGA Min Mod Max Total NT Comments   BASIC ADLs:              Bathing/ Showering [] [] [] [] [] [] [] [] [] [x]    Toileting [] [] [] [] [] [] [] [] [] [x]    Dressing [] [] [] [] [] [] [] [] [] [x]    Feeding [] [] [] [] [] [] [] [] [] [x]    Grooming [] [] [] [] [] [] [] [] [] [x]     Personal Device Care [] [] [] [] [] [] [] [] [] [x]    Functional Mobility [] [] [] [] [] [] [] [] [] [x]    I=Independent, Mod I=Modified Independent, S=Supervision, SBA=Standby Assistance, CGA=Contact Guard Assistance,   Min=Minimal Assistance, Mod=Moderate Assistance, Max=Maximal Assistance, Total=Total Assistance, NT=Not Tested    MOBILITY: I Mod I S SBA CGA Min Mod Max Total  NT x2 Comments:   Supine to sit [] [] [] [] [] [] [] [] [] [x] []    Sit to supine [] [] [] [] [] [] [] [] [] [x] []    Sit to stand [] [] [x] [] [] [] [] [] [] [] []    Bed to chair [] [] [] [] [] [] [] [] [] [x] []    I=Independent, Mod I=Modified Independent, S=Supervision, SBA=Standby Assistance, CGA=Contact Guard Assistance,   Min=Minimal Assistance, Mod=Moderate Assistance, Max=Maximal Assistance, Total=Total Assistance, NT=Not Tested    [address] [facility] [address] [address] AM-PAC 6 Clicks   Daily Activity Inpatient Short Form        How much help from another person does the patient currently need. .. Total A Lot A Little None   1. Putting on and taking off regular lower body clothing? [] 1   [] 2   [x] 3   [] 4   2. Bathing (including washing, rinsing, drying)? [] 1   [] 2   [x] 3   [] 4   3. Toileting, which includes using toilet, bedpan or urinal?   [] 1   [] 2   [x] 3   [] 4   4. Putting on and taking off regular upper body clothing? [] 1   [] 2   [x] 3   [] 4   5. Taking care of personal grooming such as brushing teeth? [] 1   [] 2   [] 3   [x] 4   6. Eating meals? [] 1   [] 2   [] 3   [x] 4   © 2007, Trustees of [address] [place] [address] [address] [address], under license to [employer]. All rights reserved     Score:  Initial: 20 Most Recent: X (Date: -- )   Interpretation of Tool:  Represents activities that are increasingly more difficult (i.e. Bed mobility, Transfers, Gait). PLAN:   FREQUENCY/DURATION: OT Plan of Care: 3 times/week for duration of hospital stay or until stated goals are met, whichever comes first.    PROBLEM LIST:   (Skilled intervention is medically necessary to address:)  1. Decreased ADL/Functional Activities  2. Decreased Activity Tolerance  3. Decreased Balance  4. Decreased Coordination  5. Decreased Gait Ability  6. Decreased Strength  7. Decreased Transfer Abilities   INTERVENTIONS PLANNED:   (Benefits and precautions of occupational therapy have been discussed with the patient.)  1. Self Care Training  2. Therapeutic Activity  3. Therapeutic Exercise/HEP  4. Neuromuscular Re-education  5. Education     TREATMENT:     TREATMENT:   ( $$ Therapeutic Exercises: 8-22 mins   )  Therapeutic Exercise (15 Minutes): Therapeutic exercises noted below to improve functional AROM and strength.      TREATMENT GRID:  B UE's with blue thera band and 8 pound weight  Date:  10/4/21 Date:   Date:     Activity/Exercise Parameters Parameters Parameters   Shoulder flex/ex 20 reps      Shoulder hor abd/add 20 reps      Elbow flex/ex  20 reps      Punches  20 reps                              AFTER TREATMENT POSITION/PRECAUTIONS:  Chair, Needs within reach and RN notified    INTERDISCIPLINARY COLLABORATION:  RN/PCT, PT/PTA and OT/SPICER    TOTAL TREATMENT DURATION:  OT Patient Time In/Time Out  Time In: 1140  Time Out: [address] [place] [address] Nela Negro

## 2021-10-04 NOTE — PROGRESS NOTES
ACUTE PHYSICAL THERAPY GOALS:  (Developed with and agreed upon by patient and/or caregiver. )  LTG:  (1.)Mr. Deloris Silva will move from supine to sit and sit to supine , scoot up and down and roll side to side in bed with MODIFIED INDEPENDENCE within 7 treatment day(s). (2.)Mr. Deloris Silva will transfer from bed to chair and chair to bed with INDEPENDENCE using the least restrictive device within 7 treatment day(s). (3.)Mr. Deloris Silva will ambulate with INDEPENDENCE for 200 feet with the least restrictive device within 7 treatment day(s). (4.)Mr. Deloris Silva will participate in therapeutic activity/exercises x 25 minutes for increased strength within 7 treatment days. PHYSICAL THERAPY: Daily Note and AM Treatment Day # 5    Kristina Nails is a 42 y.o. male   PRIMARY DIAGNOSIS: Acute hypoxemic respiratory failure due to COVID-19 [facility])  Acute hypoxemic respiratory failure [facility] [U07.1, J96.01]         ASSESSMENT:     REHAB RECOMMENDATIONS: CURRENT LEVEL OF FUNCTION:  (Most Recently Demonstrated)   Recommendation to date pending progress:  Setting:   Outpatient Therapy  Equipment:    None Bed Mobility:   Not tested  Sit to Stand:   Modified Independent  Transfers:   Modified Independent  Gait/Mobility:   Modified Independent     ASSESSMENT:  Mr. Deloris Silva presents in chair, on 1 L O2 HF, stats 92% at rest. Pt recently performed walk test with RT, focused on standing there ex and activity pacing this date. Pt able to perform activity x 23+ minutes, O2 stats 89-91%, -125 with activity. Pt would complain of chest tightness as HR increased, O2 stats remained stable throughout session. Pt overall doing well with general activity. 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  Pt making good progress toward goals this date.       SUBJECTIVE:   Mr. Ledy Soler states, \"I just finished walking about 10 min ago\"    SOCIAL HISTORY/ LIVING ENVIRONMENT: see eval  Support Systems: Spouse/Significant Other  OBJECTIVE:     PAIN: VITAL SIGNS: LINES/DRAINS:   Pre Treatment: Pain Screen  Pain Scale 1: Numeric (0 - 10)  Pain Intensity 1: 0  Post Treatment: 0 Vital Signs  O2 Sat (%): 92 %  O2 Device: Hi flow nasal cannula  O2 Flow Rate (L/min): 1 l/min Continuous Pulse Oximetry  O2 Device: Hi flow nasal cannula     MOBILITY: I Mod I S SBA CGA Min Mod Max Total  NT x2 Comments:   Bed Mobility    Rolling [] [] [] [] [] [] [] [] [] [x] [] In chair   Supine to Sit [] [] [] [] [] [] [] [] [] [x] []    Scooting [] [] [] [] [] [] [] [] [] [x] []    Sit to Supine [] [] [] [] [] [] [] [] [] [x] []    Transfers    Sit to Stand [x] [] [] [] [] [] [] [] [] [] []    Bed to Chair [] [] [] [] [] [] [] [] [] [x] []    Stand to Sit [x] [] [] [] [] [] [] [] [] [] []    I=Independent, Mod I=Modified Independent, S=Supervision, SBA=Standby Assistance, CGA=Contact Guard Assistance,   Min=Minimal Assistance, Mod=Moderate Assistance, Max=Maximal Assistance, Total=Total Assistance, NT=Not Tested    BALANCE: Good Fair+ Fair Fair- Poor NT Comments   Sitting Static [x] [] [] [] [] []    Sitting Dynamic [x] [] [] [] [] []              Standing Static [x] [] [] [] [] []    Standing Dynamic [] [x] [] [] [] []      GAIT: I Mod I S SBA CGA Min Mod Max Total  NT x2 Comments:   Level of Assistance [] [] [] [] [] [] [] [] [] [x] [] Just around at chair for exercises   Distance     DME None    Gait Quality slow    Weightbearing  Status N/A     I=Independent, Mod I=Modified Independent, S=Supervision, SBA=Standby Assistance, CGA=Contact Guard Assistance,   Min=Minimal Assistance, Mod=Moderate Assistance, Max=Maximal Assistance, Total=Total Assistance, NT=Not Tested    PLAN:   FREQUENCY/DURATION: PT Plan of Care: 3 times/week for duration of hospital stay or until stated goals are met, whichever comes first.  TREATMENT:     TREATMENT:   ($$ Therapeutic Exercises: 23-37 mins    )  Therapeutic Exercise (23 Minutes): Therapeutic exercises noted below to improve functional activity tolerance, strength and mobility.      TREATMENT GRID:   Date:  9/30/21 Date:  10/1/21 Date:  10/4/21   Activity/Exercise Parameters Parameters Parameters   ambulation 100ft     Calf raises 15x B 20x B X 20 B    Standing hip abd 15x B 20x B X 20 B    High knee marching 15x B 20x B X 20 B (rest breaks)   squats 10x 15x B X 20 B (rest break)   Standing hip ext  20x B X 20 B    Standing HS curls  20x B X 20 B   HS curls with \"raise the roof\" arm lifts  10x B          AFTER TREATMENT POSITION/PRECAUTIONS:  Chair, Needs within reach and RN notified    INTERDISCIPLINARY COLLABORATION:  RN/PCT and PT/PTA    TOTAL TREATMENT DURATION:  PT Patient Time In/Time Out  Time In: 1118  Time Out: [address], PT

## 2021-10-04 NOTE — PROGRESS NOTES
Discharge instructions, prescriptions and oxygen given and patient voiced understanding. Denies pain or shortness of breath.  Patient discharged via wheelchair to home accompanied by staff

## 2021-10-04 NOTE — PROGRESS NOTES
MSN, CM:  Patient to be discharged home today with Interim HH services ordered. Patient will require home oxygen at this time which will be provided by [facility].  Patient agrees with this discharge plan and has met all milestones for this admission. Family to transport patient home. Care Management Interventions  PCP Verified by CM:  Riccardo Gomez)  Mode of Transport at Discharge:  Other (see comment) (family to transport)  Transition of Care Consult (CM Consult): Home Health, DME/Supply [address]: No  Discharge Durable Medical Equipment: Yes ([facility] - oxygen)  Support Systems: Spouse/Significant Other  Confirm Follow Up Transport: Family  The Plan for Transition of Care is Related to the Following Treatment Goals : Home health to regain strength back to baseline  The Patient and/or Patient Representative was Provided with a Choice of Provider and Agrees with the Discharge Plan?: Yes  Name of the Patient Representative Who was Provided with a Choice of Provider and Agrees with the Discharge Plan: Mr. Barrett Spence (patient)  [place] of Choice List was Provided with Basic Dialogue that Supports the Patient's Individualized Plan of Care/Goals, Treatment Preferences and Shares the Quality Data Associated with the Providers?: Yes  The [employer] Information Provided?:  ([employer])  Discharge Location  Discharge Placement: Home with home health

## 2021-10-04 NOTE — PROGRESS NOTES
MSN, CM:  Patient currently on 4L NC today. Patient will received [facility] services upon discharge. Spoke with RT Niurka at 08:30 AM about walk test orders. Niurka explained that they were aware. Waiting on walk test for possible discharge today or tomorrow. Case Management will continue to follow.

## 2021-10-04 NOTE — PROGRESS NOTES
BSR received  Patient A/Ox4 resting in recliner on phone  RR even/unlabored on 6LNCHF NAD noted at this time  Patient denies needs/pain  Call light within reach  Patient instructed to call for assistance when needed with understanding verbalized

## 2021-10-04 NOTE — PROGRESS NOTES
Assumed care of patient. Assessment completed and documented, see docflow. Patient awake in bed. A//Ox4. NAD noted at present. Respirations even and non labored on 6LNCHF. Oximetry at bedside, 94%. Encouraged to call for needs. Call light within reach. Will continue to monitor.

## 2021-10-04 NOTE — PROGRESS NOTES
Oxygen Qualifier       Room air: SpO2 with O2 and liter flow   Resting SpO2  92% 94% 4 z;   Ambulating SpO2 88% 94% 1 L  90% 1 L after resting for 10 min         Completed by:    Merari Hurt PTA

## 2022-03-18 PROBLEM — D72.819 LEUKOPENIA: Status: ACTIVE | Noted: 2021-09-21

## 2022-03-19 PROBLEM — J96.01 ACUTE HYPOXEMIC RESPIRATORY FAILURE DUE TO COVID-19 (HCC): Status: ACTIVE | Noted: 2021-09-20

## 2022-03-19 PROBLEM — U07.1 PNEUMONIA DUE TO COVID-19 VIRUS: Status: ACTIVE | Noted: 2021-09-20

## 2022-03-19 PROBLEM — J12.82 PNEUMONIA DUE TO COVID-19 VIRUS: Status: ACTIVE | Noted: 2021-09-20

## 2022-03-19 PROBLEM — U07.1 ACUTE HYPOXEMIC RESPIRATORY FAILURE DUE TO COVID-19 (HCC): Status: ACTIVE | Noted: 2021-09-20

## 2022-08-30 NOTE — PROGRESS NOTES
A follow up visit was made to the patient. Emotional support, spiritual presence and   prayer were provided for the patient. He was alert and his nurse shared that he is improving.       Nils Toro, [facility], [facility]   Admitting MD

## 2024-07-23 ENCOUNTER — HOSPITAL ENCOUNTER (EMERGENCY)
Age: 44
Discharge: HOME | End: 2024-07-23
Attending: EMERGENCY MEDICINE
Payer: COMMERCIAL

## 2024-07-23 ENCOUNTER — APPOINTMENT (OUTPATIENT)
Dept: GENERAL RADIOLOGY | Age: 44
End: 2024-07-23
Payer: COMMERCIAL

## 2024-07-23 VITALS
HEART RATE: 76 BPM | SYSTOLIC BLOOD PRESSURE: 146 MMHG | RESPIRATION RATE: 17 BRPM | WEIGHT: 200 LBS | HEIGHT: 72 IN | DIASTOLIC BLOOD PRESSURE: 109 MMHG | BODY MASS INDEX: 27.09 KG/M2 | OXYGEN SATURATION: 98 % | TEMPERATURE: 98.2 F

## 2024-07-23 DIAGNOSIS — S69.91XA INJURY OF RIGHT HAND, INITIAL ENCOUNTER: Primary | ICD-10-CM

## 2024-07-23 PROCEDURE — 99283 EMERGENCY DEPT VISIT LOW MDM: CPT

## 2024-07-23 PROCEDURE — 6370000000 HC RX 637 (ALT 250 FOR IP): Performed by: EMERGENCY MEDICINE

## 2024-07-23 PROCEDURE — 73130 X-RAY EXAM OF HAND: CPT

## 2024-07-23 RX ORDER — HYDROCODONE BITARTRATE AND ACETAMINOPHEN 7.5; 325 MG/1; MG/1
1 TABLET ORAL EVERY 6 HOURS PRN
Qty: 15 TABLET | Refills: 0 | Status: SHIPPED | OUTPATIENT
Start: 2024-07-23 | End: 2024-07-28

## 2024-07-23 RX ORDER — HYDROCODONE BITARTRATE AND ACETAMINOPHEN 10; 325 MG/1; MG/1
1 TABLET ORAL
Status: COMPLETED | OUTPATIENT
Start: 2024-07-23 | End: 2024-07-23

## 2024-07-23 RX ADMIN — HYDROCODONE BITARTRATE AND ACETAMINOPHEN 1 TABLET: 10; 325 TABLET ORAL at 18:36

## 2024-07-23 ASSESSMENT — ENCOUNTER SYMPTOMS
SHORTNESS OF BREATH: 0
BACK PAIN: 0
COLOR CHANGE: 0
COUGH: 0

## 2024-07-23 NOTE — ED TRIAGE NOTES
Pt brought in by ems from work, pt is  with R hand palm facing up when run over by [employer] model [employer]. Pain to R hand, in bag of ice water at this time.     Took [id number] tablets ibuprofen PTA    A&Ox4

## 2024-07-23 NOTE — ED PROVIDER NOTES
for back pain and neck pain.   Skin:  Negative for color change, rash and wound.   Neurological:  Negative for numbness and headaches.   All other systems reviewed and are negative.       Physical Exam     Vitals signs and nursing note reviewed:  Vitals:    07/23/24 1836 07/23/24 1900 07/23/24 1928 07/23/24 1958   BP: (!) 139/96 (!) 138/102 (!) 149/109 (!) 139/106   Pulse: 83 76  76   Resp: 17 17     Temp:       TempSrc:       SpO2: 95% 99% 99% 97%   Weight:       Height:          Physical Exam  Vitals and nursing note reviewed.   Constitutional:       Appearance: Normal appearance.   HENT:      Head: Normocephalic and atraumatic.   Cardiovascular:      Rate and Rhythm: Normal rate and regular rhythm.   Pulmonary:      Effort: Pulmonary effort is normal.      Breath sounds: Normal breath sounds.   Musculoskeletal:         General: Tenderness (Right hand diffusely.  Limited range of motion due to pain.) present. No swelling.   Skin:     General: Skin is warm and dry.   Neurological:      Mental Status: He is alert.        Procedures     Procedures    Orders Placed This Encounter   Procedures    XR HAND RIGHT (MIN 3 VIEWS)        Medications given during this emergency department visit:  Medications   HYDROcodone-acetaminophen (NORCO)  MG per tablet 1 tablet (1 tablet Oral Given 7/23/24 1836)       New Prescriptions    HYDROCODONE-ACETAMINOPHEN (NORCO) 7.5-325 MG PER TABLET    Take 1 tablet by mouth every 6 hours as needed for Pain for up to 5 days. Intended supply: 5 days. Take lowest dose possible to manage pain Max Daily Amount: 4 tablets        History reviewed. No pertinent past medical history.     History reviewed. No pertinent surgical history.     Social History     Socioeconomic History    Marital status:      Spouse name: None    Number of children: None    Years of education: None    Highest education level: None   Tobacco Use    Smoking status: Never    Smokeless tobacco: Never   Substance

## 2025-01-13 ENCOUNTER — APPOINTMENT (OUTPATIENT)
Dept: URBAN - METROPOLITAN AREA CLINIC 329 | Facility: CLINIC | Age: 45
Setting detail: DERMATOLOGY
End: 2025-01-13

## 2025-01-13 DIAGNOSIS — L57.8 OTHER SKIN CHANGES DUE TO CHRONIC EXPOSURE TO NONIONIZING RADIATION: ICD-10-CM

## 2025-01-13 DIAGNOSIS — L81.4 OTHER MELANIN HYPERPIGMENTATION: ICD-10-CM

## 2025-01-13 DIAGNOSIS — D22 MELANOCYTIC NEVI: ICD-10-CM

## 2025-01-13 DIAGNOSIS — D18.0 HEMANGIOMA: ICD-10-CM

## 2025-01-13 DIAGNOSIS — L82.1 OTHER SEBORRHEIC KERATOSIS: ICD-10-CM

## 2025-01-13 PROBLEM — D18.01 HEMANGIOMA OF SKIN AND SUBCUTANEOUS TISSUE: Status: ACTIVE | Noted: 2025-01-13

## 2025-01-13 PROBLEM — D22.5 MELANOCYTIC NEVI OF TRUNK: Status: ACTIVE | Noted: 2025-01-13

## 2025-01-13 PROBLEM — D22.71 MELANOCYTIC NEVI OF RIGHT LOWER LIMB, INCLUDING HIP: Status: ACTIVE | Noted: 2025-01-13

## 2025-01-13 PROBLEM — D22.61 MELANOCYTIC NEVI OF RIGHT UPPER LIMB, INCLUDING SHOULDER: Status: ACTIVE | Noted: 2025-01-13

## 2025-01-13 PROBLEM — D22.72 MELANOCYTIC NEVI OF LEFT LOWER LIMB, INCLUDING HIP: Status: ACTIVE | Noted: 2025-01-13

## 2025-01-13 PROCEDURE — ? FULL BODY SKIN EXAM

## 2025-01-13 PROCEDURE — ? COUNSELING

## 2025-01-13 PROCEDURE — ? TREATMENT REGIMEN

## 2025-01-13 PROCEDURE — 99203 OFFICE O/P NEW LOW 30 MIN: CPT

## 2025-01-13 ASSESSMENT — LOCATION DETAILED DESCRIPTION DERM
LOCATION DETAILED: UPPER STERNUM
LOCATION DETAILED: MID-FRONTAL SCALP
LOCATION DETAILED: RIGHT ANTERIOR DISTAL THIGH
LOCATION DETAILED: SUPERIOR THORACIC SPINE
LOCATION DETAILED: RIGHT PROXIMAL DORSAL FOREARM
LOCATION DETAILED: RIGHT VENTRAL DISTAL FOREARM
LOCATION DETAILED: LEFT INFERIOR UPPER BACK
LOCATION DETAILED: LEFT DISTAL POSTERIOR THIGH
LOCATION DETAILED: LEFT CENTRAL MALAR CHEEK
LOCATION DETAILED: RIGHT SUPERIOR MEDIAL UPPER BACK
LOCATION DETAILED: RIGHT CLAVICULAR SKIN
LOCATION DETAILED: VENTRAL PENILE SHAFT
LOCATION DETAILED: LEFT PROXIMAL POSTERIOR THIGH
LOCATION DETAILED: LEFT ANTERIOR DISTAL UPPER ARM
LOCATION DETAILED: EPIGASTRIC SKIN
LOCATION DETAILED: RIGHT ANTERIOR DISTAL UPPER ARM
LOCATION DETAILED: LEFT DISTAL CALF

## 2025-01-13 ASSESSMENT — LOCATION ZONE DERM
LOCATION ZONE: SCALP
LOCATION ZONE: ARM
LOCATION ZONE: TRUNK
LOCATION ZONE: PENIS
LOCATION ZONE: LEG
LOCATION ZONE: FACE

## 2025-01-13 ASSESSMENT — LOCATION SIMPLE DESCRIPTION DERM
LOCATION SIMPLE: RIGHT THIGH
LOCATION SIMPLE: RIGHT FOREARM
LOCATION SIMPLE: UPPER BACK
LOCATION SIMPLE: LEFT UPPER BACK
LOCATION SIMPLE: ABDOMEN
LOCATION SIMPLE: LEFT UPPER ARM
LOCATION SIMPLE: RIGHT UPPER ARM
LOCATION SIMPLE: LEFT CALF
LOCATION SIMPLE: PENIS
LOCATION SIMPLE: ANTERIOR SCALP
LOCATION SIMPLE: RIGHT CLAVICULAR SKIN
LOCATION SIMPLE: CHEST
LOCATION SIMPLE: LEFT POSTERIOR THIGH
LOCATION SIMPLE: LEFT CHEEK
LOCATION SIMPLE: RIGHT UPPER BACK

## 2025-03-26 NOTE — PROGRESS NOTES
[facility] Critical Care Note[de-identified] 9/24/2021  [address], [address]  Admission Date: 9/20/2021     Length of Stay: 4 days    Background: Patient is a 42 y.o.  male seen and evaluated at the request of Dr. Marlin Mckeon for dyspnea and COVID 19. The patient presented to the ED with c/o worsening shortness of breath for almost two weeks. The patient tested positive for COVID at [facility] 9/10/2021. The patient c/o sinus congestion and cough. EMS was unable to  O2 sat at home and patient was placed on 15L O2 with improvement of O2 sats in the 80s, no fever noted on arrival to ED  CXR reveals bilateral atypical pneumonia, CRP 9.1, ABG reveals pH 7.43, CO2 37.1, O2 87, HCO3 24.6.      The pt is unvaccinated. He has had fever and difficulty taking deep breaths. He reports some pain with breathing but no hemoptysis. He has not had any purulent sputum.        Notable PMH:  has no past medical history on file. 24 Hour events: Currently on 80% BIPAP with sat 97%. Dropped to 70%. Confused again last PM and pulled off mask but desaturated only into mid 80's. Now on precedex at 0.5 and comfortable. ROS: unable to obtain/negative except as listed elsewhere. Lines: (insertion date)   ETT: (N/A)  Leggett: (N/A)  OGT: (N/A)  Central line: (N/A)  Arterial Line (N/A)    Drips: current dose (range)    None  Precedex Dose (mcg/kg/hr): 0.5 mcg/kg/hr     Pertinent Exam:         Blood pressure 139/88, pulse (!) 43, temperature (!) 96.3 °F (35.7 °C), resp. rate 12, height 6' (1.829 m), weight 190 lb (86.2 kg), SpO2 93 %. Intake/Output Summary (Last 24 hours) at 9/24/2021 1031  Last data filed at 9/24/2021 0916  Gross per 24 hour   Intake 1424.83 ml   Output 1875 ml   Net -450.17 ml     Constitutional:  Comfortable on BIPAP.    EENMT:  Sclera clear, pupils equal, oral mucosa moist  Respiratory: Decreased BS with scattered rhonchi  Cardiovascular:  RRR  Gastrointestinal:  soft with no tenderness; positive bowel sounds present  Musculoskeletal:  warm with no cyanosis, no lower extremity edema  Skin:  no jaundice or ecchymosis  Neurologic: awakens and non focal  Psychiatric: calm    CXR:     9/23, 22:          9/21, 20: Improved aeration bilaterally. Recent Labs     09/24/21  0310 09/23/21  1053 09/22/21  0519   WBC 1.6* 2.6* 1.9*   HGB 13.9 14.6 13.4*   HCT 41.9 45.0 42.9    375 361     Recent Labs     09/24/21  0310 09/23/21  1053 09/22/21  0519    141 145   K 4.2 3.7 3.8    105 107   CO2 26 26 27   * 66 74   BUN 21 22 24*   CREA 0.69* 0.74* 0.86   CA 8.7 8.9 9.0     Recent Labs     09/24/21  0310   CRP 1.2*     Recent Labs     09/24/21  0633 09/23/21  2112 09/23/21  1543   GLUCPOC 109* 161* 99     ECHO: No results found for this or any previous visit. Results     ** No results found for the last 336 hours. **        Inpat Anti-Infectives (From admission, onward)    None        Ventilator Settings:  Ideal body weight: 77.6 kg (171 lb 1.2 oz)   Mode FIO2 Rate Tidal Volume Pressure PEEP      80 %               Peak airway pressure:         Minute ventilation: 17.5 l/min  ABG:  No results for input(s): PH, PCO2, PO2, HCO3, PHI, PCO2I, PO2I, HCO3I in the last 72 hours. Assessment and Plan:  (Medical Decision Making)   Impression: 42 y.o. male with acute hypoxemic RF due to COVID 19 pneumonia. NEURO:   Sedation: None  Analgesia: None  Paralytics: None  CV:   Volume Status: Appears euvolemic  PULM:   Acute hypoxemic/hypercapneic respiratory failure: Had weaned to 50% on BIPAP. CXR with better aeration suggesting Atx was significant reason for severe hypoxemia. Now requiring higher FIO2 BIPAP. Severe ARDS 2/2 COVID: On high dose decadron and got Actemra. Continue high dose decadron for 5 doses then 10 mg for 5 days. CRP improved today. RENAL:  ЕКАТЕРИНА: Cr 0.69  Lactic acidosis: AG 9.    Electrolytes: BS OK  GI:   Nutrition: will consult nutrition for peripheral alimentation. HEME:   Leukopenia: WBC up to 1.6. May need hematology to see if persists  Anemia: N/A  Thrombocytopenia: N/A  Anticoagulation: lovenox 40 q12  ID:   COVID-19: On high dose decadron and got Actemra. ENDO:   DM: SSI  Skin: no decub, turns, preventive care  Prophy: H2B, lovenox    Wife Tammie Lara called at [phone] with update.      Full Code    The patient is critically ill with respiratory failure, circulatory failure and requires high complexity decision making for assessment and support including frequent ventilator adjustment , frequent evaluation and titration of therapies , application of advanced monitoring technologies and extensive interpretation of multiple databases        Alba Guy MD <<--- Click to launch 2-4 times/mo

## 2025-06-06 LAB
CHOLEST SERPL-MCNC: 215 MG/DL (ref 0–200)
GLUCOSE SERPL-MCNC: 86 MG/DL (ref 70–99)
HDLC SERPL-MCNC: 46 MG/DL (ref 40–60)
LDLC SERPL CALC-MCNC: 151 MG/DL (ref 0–100)
TRIGL SERPL-MCNC: 93 MG/DL (ref 0–150)